# Patient Record
Sex: FEMALE | Race: WHITE | Employment: OTHER | RURAL
[De-identification: names, ages, dates, MRNs, and addresses within clinical notes are randomized per-mention and may not be internally consistent; named-entity substitution may affect disease eponyms.]

---

## 2017-04-18 ENCOUNTER — OFFICE VISIT (OUTPATIENT)
Dept: FAMILY MEDICINE CLINIC | Age: 47
End: 2017-04-18

## 2017-04-18 VITALS
BODY MASS INDEX: 24.69 KG/M2 | TEMPERATURE: 97.8 F | SYSTOLIC BLOOD PRESSURE: 100 MMHG | DIASTOLIC BLOOD PRESSURE: 70 MMHG | HEART RATE: 72 BPM | HEIGHT: 65 IN | RESPIRATION RATE: 16 BRPM | OXYGEN SATURATION: 96 % | WEIGHT: 148.2 LBS

## 2017-04-18 DIAGNOSIS — D32.0 BENIGN MENINGIOMA OF BRAIN (HCC): Primary | ICD-10-CM

## 2017-04-18 DIAGNOSIS — G40.909 SEIZURE DISORDER, SECONDARY (HCC): ICD-10-CM

## 2017-04-18 NOTE — MR AVS SNAPSHOT
Visit Information Date & Time Provider Department Dept. Phone Encounter #  
 4/18/2017  2:40 PM [facility] [phone] [id number] Follow-up Instructions Return if symptoms worsen or fail to improve. Follow-up and Disposition History Upcoming Health Maintenance Date Due DTaP/Tdap/Td series (1 - Tdap) 8/31/1990 PAP AKA CERVICAL CYTOLOGY 8/31/1990 INFLUENZA AGE 9 TO ADULT 8/1/2016 Allergies as of 4/18/2017  Review Complete On: 4/18/2017 By: Gabriela Quinones MD  
  
 Severity Noted Reaction Type Reactions Pcn [Penicillins]  07/21/2015    Hives Current Immunizations  Never Reviewed No immunizations on file. Not reviewed this visit You Were Diagnosed With   
  
 Codes Comments Benign [facility] ([facility]    -  Primary ICD-10-CM: D32.0 ICD-9-CM: 225.2 Seizure disorder, secondary ([facility]     ICD-10-CM: G40.909 ICD-9-CM: 345.90 Vitals BP Pulse Temp Resp Height(growth percentile) 100/70 (BP 1 Location: Left arm, BP Patient Position: Sitting) 72 97.8 °F (36.6 °C) (Temporal) 16 5' 5\" (1.651 m) Weight(growth percentile) SpO2 BMI OB Status Smoking Status 148 lb 3.2 oz (67.2 kg) 96% 24.66 kg/m2 Having regular periods Never Smoker Vitals History BMI and BSA Data Body Mass Index Body Surface Area  
 24.66 kg/m 2 1.76 m 2 Preferred Pharmacy Pharmacy Name Phone [facility] [address] [place] [address], [address] [place] [address] Dereck Leonard 101 Your Updated Medication List  
  
   
This list is accurate as of: 4/18/17  4:14 PM.  Always use your most recent med list.  
  
  
  
  
 lamoTRIgine 100 mg tablet Commonly known as: LaMICtal  
Take 100 mg by mouth two (2) times a day. multivitamin tablet Commonly known as:  ONE A DAY Take 1 Tab by mouth daily. VITAMIN C 500 mg tablet Generic drug:  ascorbic acid (vitamin C) Take 500 mg by mouth daily. Follow-up Instructions Return if symptoms worsen or fail to improve. Introducing [facility] SERVICES! Dear Ed Valdes: Thank you for requesting a MyVR account. Our records indicate that you already have an active MyVR account. You can access your account anytime at [email or web address] Did you know that you can access your hospital and ER discharge instructions at any time in MyVR? You can also review all of your test results from your hospital stay or ER visit. Additional Information If you have questions, please visit the Frequently Asked Questions section of the MyVR website at [email or web address] Remember, MyVR is NOT to be used for urgent needs. For medical emergencies, dial 911. Now available from your iPhone and Android! Please provide this summary of care documentation to your next provider. Your primary care clinician is listed as Aimee Cuevas. If you have any questions after today's visit, please call [phone].

## 2017-04-18 NOTE — PROGRESS NOTES
Cindy Corley is a 51 y.o. female presenting for/with: Other (Discuss Starting Back On Injections For Brain Tumor.)      HPI:  Cindy Corley is a 51 y.o. female who presents today for follow up history of meningioma being treated by Dr. Lu Thompson at [facility]. Had failure of just Sandostatin LAR 30mg IM qmo. Had serial MRIs over the summer show progression of the tumor. Considered for trial with [facility] Dr. Scruggs for consideration of a phase II clinical trial, but was turned down. Wound up getting spell of biliary colic, had a routine lap richy with Dr Jess Kang this past Dec 2016. Now is under consideration for a couple of options to try to prevent progression- avastin infusion q2wk and standostatin LAR inj qmo vs afinitor oral plus sandostatin LAR inj qmo. Continues to have occasional double vision. Using prisms, helps. No recent issues of blurred vision, paresthesia,  anesthesia or weakness. Continues to follow-up with [facility] only now, has follow-up in 4/20/17. PMH, SH, Medications/Allergies: reviewed, on chart.     ROS:  Constitutional: No fever, chills or weight loss  Respiratory: No cough, SOB   CV: No chest pain or Palpitations    Visit Vitals    /70 (BP 1 Location: Left arm, BP Patient Position: Sitting)    Pulse 72    Temp 97.8 °F (36.6 °C) (Temporal)    Resp 16    Ht 5' 5\" (1.651 m)    Wt 148 lb 3.2 oz (67.2 kg)    SpO2 96%    BMI 24.66 kg/m2     Wt Readings from Last 3 Encounters:   04/18/17 148 lb 3.2 oz (67.2 kg)   12/19/16 151 lb 4.8 oz (68.6 kg)   11/01/16 160 lb (72.6 kg)     Physical Examination: General appearance - alert, well appearing, and in no distress  Mental status - alert, oriented to person, place, and time  Eyes - pupils equal and reactive, extraocular eye movements intact  ENT - bilateral external ears and nose normal. Normal lips  Neck - supple, no significant adenopathy, no thyromegaly or mass  Lymphatics - no palpable lymphadenopathy, no hepatosplenomegaly  Chest - clear to auscultation, no wheezes, rales or rhonchi, symmetric air entry  Heart - normal rate, regular rhythm, normal S1, S2, no murmurs, rubs, clicks or gallops  Extremities - peripheral pulses normal, no pedal edema, no clubbing or cyanosis  Neuro- CN 2-12 intact to test, nl coordination and ROM UE and LE. Nl gait and Rhomberg. A/p:  Recurrent meningioma  With persistent neuro sx (diplopioa) and progression in size on MRI monitoring. Remains seizure free since last visit on lamictal 100 BID. shanna well. I note that we would be glad to inject the sandostatin LAR here, and that we have an infusion center in [place] (ph [phone]) staffed by trained oncology nurses who are familiar with handling avastin and other chemotherapy medications, which may be a good option for administration. F/U per neuro.

## 2017-07-17 ENCOUNTER — TELEPHONE (OUTPATIENT)
Dept: FAMILY MEDICINE CLINIC | Age: 47
End: 2017-07-17

## 2017-07-17 DIAGNOSIS — Z79.899 HIGH RISK MEDICATIONS (NOT ANTICOAGULANTS) LONG-TERM USE: Primary | ICD-10-CM

## 2017-07-17 DIAGNOSIS — E78.2 MIXED HYPERLIPIDEMIA: ICD-10-CM

## 2017-07-17 NOTE — TELEPHONE ENCOUNTER
Patient is requesting lab orders placed. She notes bruising and would like to know what her levels are. I'll schedule a nurse visit once they are in the system.

## 2017-07-18 PROBLEM — E78.2 MIXED HYPERLIPIDEMIA: Status: ACTIVE | Noted: 2017-07-18

## 2017-07-25 ENCOUNTER — CLINICAL SUPPORT (OUTPATIENT)
Dept: FAMILY MEDICINE CLINIC | Age: 47
End: 2017-07-25

## 2017-07-25 DIAGNOSIS — D32.0 BENIGN MENINGIOMA OF BRAIN (HCC): Primary | ICD-10-CM

## 2017-07-25 DIAGNOSIS — E78.2 MIXED HYPERLIPIDEMIA: ICD-10-CM

## 2017-07-25 DIAGNOSIS — Z00.00 ROUTINE GENERAL MEDICAL EXAMINATION AT A HEALTH CARE FACILITY: ICD-10-CM

## 2017-07-25 NOTE — MR AVS SNAPSHOT
Visit Information Date & Time Provider Department Dept. Phone Encounter #  
 7/25/2017  8:15 AM [facility] [phone] [id number] Upcoming Health Maintenance Date Due DTaP/Tdap/Td series (1 - Tdap) 8/31/1990 PAP AKA CERVICAL CYTOLOGY 8/31/1990 INFLUENZA AGE 9 TO ADULT 8/1/2017 Allergies as of 7/25/2017  Review Complete On: 4/18/2017 By: Jeny Hurley MD  
  
 Severity Noted Reaction Type Reactions Pcn [Penicillins]  07/21/2015    Hives Current Immunizations  Never Reviewed No immunizations on file. Not reviewed this visit You Were Diagnosed With   
  
 Codes Comments Benign meningioma of brain ([facility]    -  Primary ICD-10-CM: D32.0 ICD-9-CM: 225.2 Mixed hyperlipidemia     ICD-10-CM: E78.2 ICD-9-CM: 272.2 Routine general medical examination at a health care facility     ICD-10-CM: Z00.00 ICD-9-CM: V70.0 Vitals OB Status Smoking Status Having regular periods Never Smoker Preferred Pharmacy Pharmacy Name Phone [facility] [address] [place] [address] Dereck Leonard 101 Your Updated Medication List  
  
   
This list is accurate as of: 7/25/17  8:26 AM.  Always use your most recent med list.  
  
  
  
  
 lamoTRIgine 100 mg tablet Commonly known as: LaMICtal  
Take 100 mg by mouth two (2) times a day. multivitamin tablet Commonly known as:  ONE A DAY Take 1 Tab by mouth daily. VITAMIN C 500 mg tablet Generic drug:  ascorbic acid (vitamin C) Take 500 mg by mouth daily. We Performed the Following CBC WITH AUTOMATED DIFF [63204 CPT(R)] LIPID PANEL [99983 CPT(R)] METABOLIC PANEL, COMPREHENSIVE [21929 CPT(R)] MO COLLECTION VENOUS BLOOD,VENIPUNCTURE C5699230 CPT(R)] TSH 3RD GENERATION [85483 CPT(R)] [facility]! Dear Kimberlyn Beavers: Thank you for requesting a [employer] account. Our records indicate that you already have an active [employer] account. You can access your account anytime at [email or web address] Did you know that you can access your hospital and ER discharge instructions at any time in [employer]? You can also review all of your test results from your hospital stay or ER visit. Additional Information If you have questions, please visit the Frequently Asked Questions section of the [employer] website at [email or web address] Remember, [employer] is NOT to be used for urgent needs. For medical emergencies, dial 911. Now available from your iPhone and Android! Please provide this summary of care documentation to your next provider. Your primary care clinician is listed as Lise Bound. If you have any questions after today's visit, please call [phone].

## 2017-07-26 ENCOUNTER — CLINICAL SUPPORT (OUTPATIENT)
Dept: FAMILY MEDICINE CLINIC | Age: 47
End: 2017-07-26

## 2017-07-26 DIAGNOSIS — D32.0 BENIGN MENINGIOMA OF BRAIN (HCC): Primary | ICD-10-CM

## 2017-07-26 LAB
ALBUMIN SERPL-MCNC: 4.7 G/DL (ref 3.5–5.5)
ALBUMIN/GLOB SERPL: 2.1 {RATIO} (ref 1.2–2.2)
ALP SERPL-CCNC: 69 IU/L (ref 39–117)
ALT SERPL-CCNC: 10 IU/L (ref 0–32)
AST SERPL-CCNC: 18 IU/L (ref 0–40)
BASOPHILS # BLD AUTO: 0 X10E3/UL (ref 0–0.2)
BASOPHILS NFR BLD AUTO: 1 %
BILIRUB SERPL-MCNC: 0.4 MG/DL (ref 0–1.2)
BUN SERPL-MCNC: 11 MG/DL (ref 6–24)
BUN/CREAT SERPL: 14 (ref 9–23)
CALCIUM SERPL-MCNC: 9.7 MG/DL (ref 8.7–10.2)
CHLORIDE SERPL-SCNC: 102 MMOL/L (ref 96–106)
CHOLEST SERPL-MCNC: 208 MG/DL (ref 100–199)
CO2 SERPL-SCNC: 20 MMOL/L (ref 18–29)
CREAT SERPL-MCNC: 0.81 MG/DL (ref 0.57–1)
EOSINOPHIL # BLD AUTO: 0.1 X10E3/UL (ref 0–0.4)
EOSINOPHIL NFR BLD AUTO: 1 %
ERYTHROCYTE [DISTWIDTH] IN BLOOD BY AUTOMATED COUNT: 13.4 % (ref 12.3–15.4)
GLOBULIN SER CALC-MCNC: 2.2 G/DL (ref 1.5–4.5)
GLUCOSE SERPL-MCNC: 86 MG/DL (ref 65–99)
HCT VFR BLD AUTO: 39.4 % (ref 34–46.6)
HDLC SERPL-MCNC: 46 MG/DL
HGB BLD-MCNC: 13.1 G/DL (ref 11.1–15.9)
IMM GRANULOCYTES # BLD: 0 X10E3/UL (ref 0–0.1)
IMM GRANULOCYTES NFR BLD: 0 %
LDLC SERPL CALC-MCNC: 126 MG/DL (ref 0–99)
LYMPHOCYTES # BLD AUTO: 1.8 X10E3/UL (ref 0.7–3.1)
LYMPHOCYTES NFR BLD AUTO: 44 %
MCH RBC QN AUTO: 29.6 PG (ref 26.6–33)
MCHC RBC AUTO-ENTMCNC: 33.2 G/DL (ref 31.5–35.7)
MCV RBC AUTO: 89 FL (ref 79–97)
MONOCYTES # BLD AUTO: 0.2 X10E3/UL (ref 0.1–0.9)
MONOCYTES NFR BLD AUTO: 5 %
NEUTROPHILS # BLD AUTO: 2 X10E3/UL (ref 1.4–7)
NEUTROPHILS NFR BLD AUTO: 49 %
PLATELET # BLD AUTO: 291 X10E3/UL (ref 150–379)
POTASSIUM SERPL-SCNC: 4.7 MMOL/L (ref 3.5–5.2)
PROT SERPL-MCNC: 6.9 G/DL (ref 6–8.5)
RBC # BLD AUTO: 4.43 X10E6/UL (ref 3.77–5.28)
SODIUM SERPL-SCNC: 143 MMOL/L (ref 134–144)
TRIGL SERPL-MCNC: 180 MG/DL (ref 0–149)
TSH SERPL DL<=0.005 MIU/L-ACNC: 4.71 UIU/ML (ref 0.45–4.5)
VLDLC SERPL CALC-MCNC: 36 MG/DL (ref 5–40)
WBC # BLD AUTO: 4.1 X10E3/UL (ref 3.4–10.8)

## 2017-08-01 RX ORDER — LEVOTHYROXINE SODIUM 25 UG/1
25 TABLET ORAL
Qty: 30 TAB | Refills: 1 | Status: SHIPPED | OUTPATIENT
Start: 2017-08-01 | End: 2019-04-16 | Stop reason: ALTCHOICE

## 2017-08-01 NOTE — PROGRESS NOTES
Lipid panel cholesterol levels improving great job! Metabolic panel liver and kidney functions  are WNL excellent! TSH is high need to start thyroid medication. On multiple herbal medication please clarify with patients which ones are current .   I will consult with pharmacist which ones are safe to continue with thyroid medication once I have the current list .

## 2017-08-01 NOTE — PROGRESS NOTES
Patient advised of lab results per Marion Gaytan. Patient stated that she would like Marion Gaytan to give her a call concerning the new Thyroid medication.

## 2017-08-16 ENCOUNTER — TELEPHONE (OUTPATIENT)
Dept: FAMILY MEDICINE CLINIC | Age: 47
End: 2017-08-16

## 2017-08-17 ENCOUNTER — OFFICE VISIT (OUTPATIENT)
Dept: FAMILY MEDICINE CLINIC | Age: 47
End: 2017-08-17

## 2017-08-17 VITALS
WEIGHT: 141 LBS | DIASTOLIC BLOOD PRESSURE: 70 MMHG | SYSTOLIC BLOOD PRESSURE: 98 MMHG | BODY MASS INDEX: 23.46 KG/M2 | OXYGEN SATURATION: 97 % | HEART RATE: 72 BPM | RESPIRATION RATE: 16 BRPM

## 2017-08-17 DIAGNOSIS — K12.30 MUCOSITIS: Primary | ICD-10-CM

## 2017-08-17 RX ORDER — OMEGA-3-ACID ETHYL ESTERS 1 G/1
2 CAPSULE, LIQUID FILLED ORAL
COMMUNITY
Start: 2011-05-03 | End: 2019-04-16 | Stop reason: ALTCHOICE

## 2017-08-17 RX ORDER — GLUCOSAMINE SULFATE 1500 MG
2000 POWDER IN PACKET (EA) ORAL
COMMUNITY
Start: 2013-09-16 | End: 2019-01-03 | Stop reason: ALTCHOICE

## 2017-08-17 RX ORDER — TRIAMCINOLONE ACETONIDE 1 MG/G
PASTE DENTAL 2 TIMES DAILY
Qty: 5 G | Refills: 11 | Status: SHIPPED | OUTPATIENT
Start: 2017-08-17 | End: 2019-01-03 | Stop reason: ALTCHOICE

## 2017-08-17 RX ORDER — FLAXSEED OIL 1000 MG
2 CAPSULE ORAL
COMMUNITY
Start: 2011-05-03 | End: 2019-01-03 | Stop reason: ALTCHOICE

## 2017-08-17 RX ORDER — LANOLIN ALCOHOL/MO/W.PET/CERES
1 CREAM (GRAM) TOPICAL
COMMUNITY
Start: 2013-09-16 | End: 2019-01-03 | Stop reason: ALTCHOICE

## 2017-08-17 RX ORDER — OCTREOTIDE ACETATE,MI-SPHERES 30 MG
VIAL (EA) INTRAMUSCULAR
COMMUNITY
Start: 2017-07-21

## 2017-08-17 RX ORDER — EVEROLIMUS 10 MG/1
10 TABLET ORAL DAILY
COMMUNITY
Start: 2017-08-15 | End: 2019-04-29 | Stop reason: SINTOL

## 2017-08-17 RX ORDER — TRIAMCINOLONE ACETONIDE 1 MG/G
PASTE DENTAL 2 TIMES DAILY
Qty: 5 G | Refills: 11 | Status: SHIPPED | OUTPATIENT
Start: 2017-08-17 | End: 2017-08-17 | Stop reason: SDUPTHER

## 2017-08-17 NOTE — PROGRESS NOTES
Chief Complaint   Patient presents with    Mouth Lesions         HPI:      Chris Parker is a 51 y.o. female leukemia survivor (age 1) now treated with Afinitor for the past 3 weeks for meningioma--benign but slowly growing. Two weeks after starting this medication, she has developed sores in her mouth. These are painful. Has tried warm salt water gargles. Allergies   Allergen Reactions    Pcn [Penicillins] Hives       Current Outpatient Prescriptions   Medication Sig    cholecalciferol (VITAMIN D3) 1,000 unit cap Take 2,000 Units by mouth.  flaxseed oil 1,000 mg cap Take 2 Caps by mouth.  omega-3 acid ethyl esters (LOVAZA) 1 gram capsule Take 2 Caps by mouth.  cyanocobalamin (VITAMIN B12) 500 mcg tablet Take 1 Tab by mouth.  lamoTRIgine (LAMICTAL) 100 mg tablet Take 100 mg by mouth two (2) times a day.  AFINITOR 10 mg tab Take 10 mg by mouth daily.  SANDOSTATIN LAR DEPOT 30 mg serr injection by Injection route every thirty (30) days.  levothyroxine (SYNTHROID) 25 mcg tablet Take 1 Tab by mouth Daily (before breakfast). Indications: hypothyroidism    multivitamin (ONE A DAY) tablet Take 1 Tab by mouth daily.  ascorbic acid (VITAMIN C) 500 mg tablet Take 500 mg by mouth daily. No current facility-administered medications for this visit. Past Medical History:   Diagnosis Date    Brain tumor (benign) ([facility] 2010    meningioma    Contact dermatitis and other eczema, due to unspecified cause 2015    morphoea    Headache     brain tumor/ medication    Leukemia ([facility] 4    age 4-4 years old    Seizures ([facility]          ROS:  Denies fever, chills, cough, chest pain, SOB,  nausea, vomiting, or diarrhea. Denies wt loss, wt gain, hemoptysis, hematochezia or melena.     Physical Examination:    BP 98/70 (BP 1 Location: Left arm, BP Patient Position: Sitting)  Pulse 72  Resp 16  Wt 141 lb (64 kg)  SpO2 97%  BMI 23.46 kg/m2    General: Alert and Ox3, Fluent speech  HEENT: NC/AT, EOMI, OP: 5 punched out lesions with a central yellow pallor, 2-3 mm diameter. Neck:  Supple, no adenopathy, JVD, mass or bruit  Chest:  Clear to Ausculation, without wheezes, rales, rubs or ronchi  Cardiac: RRR  Abdomen:  +BS, soft, nontender without palpable HSM  Extremities:  No cyanosis, clubbing or edema  Neurologic:  Ambulatory without assist, CN 2-12 grossly intact. Moves all extremities. Skin: no rash  Lymphadenopathy: no cervical or supraclavicular nodes      ASSESSMENT AND PLAN:     1. Chemotherapy induced mucositis:  Magic mouthwash, Kenalog in Orajel. Folate supplementation. RTC if sx worsen      Orders Placed This Encounter    AFINITOR 10 mg tab     Sig: Take 10 mg by mouth daily.  SANDOSTATIN LAR DEPOT 30 mg serr injection     Sig: by Injection route every thirty (30) days.  cholecalciferol (VITAMIN D3) 1,000 unit cap     Sig: Take 2,000 Units by mouth.  flaxseed oil 1,000 mg cap     Sig: Take 2 Caps by mouth.  omega-3 acid ethyl esters (LOVAZA) 1 gram capsule     Sig: Take 2 Caps by mouth.  cyanocobalamin (VITAMIN B12) 500 mcg tablet     Sig: Take 1 Tab by mouth.        Manual MD [place], [address]

## 2017-08-17 NOTE — MR AVS SNAPSHOT
Visit Information Date & Time Provider Department Dept. Phone Encounter #  
 8/17/2017 10:30 AM Mildred Bocanegra MD [facility] [phone] [id number] Upcoming Health Maintenance Date Due DTaP/Tdap/Td series (1 - Tdap) 8/31/1990 PAP AKA CERVICAL CYTOLOGY 8/31/1990 Allergies as of 8/17/2017  Review Complete On: 8/17/2017 By: Mildred Bocanegra MD  
  
 Severity Noted Reaction Type Reactions Pcn [Penicillins]  07/21/2015    Hives Current Immunizations  Never Reviewed No immunizations on file. Not reviewed this visit You Were Diagnosed With   
  
 Codes Comments Mucositis    -  Primary ICD-10-CM: K12.30 ICD-9-CM: 528.00 Vitals BP Pulse Resp Weight(growth percentile) SpO2 BMI  
 98/70 (BP 1 Location: Left arm, BP Patient Position: Sitting) 72 16 141 lb (64 kg) 97% 23.46 kg/m2 OB Status Smoking Status Having regular periods Never Smoker BMI and BSA Data Body Mass Index Body Surface Area  
 23.46 kg/m 2 1.71 m 2 Preferred Pharmacy Pharmacy Name Phone [facility] [address] [facility], [address] [place] [address] Dereck Leonard 101 Your Updated Medication List  
  
   
This list is accurate as of: 8/17/17 11:54 AM.  Always use your most recent med list.  
  
  
  
  
 AFINITOR 10 mg Tab Generic drug:  Everolimus Take 10 mg by mouth daily. cholecalciferol 1,000 unit Cap Commonly known as:  VITAMIN D3 Take 2,000 Units by mouth.  
  
 cyanocobalamin 500 mcg tablet Commonly known as:  VITAMIN B12 Take 1 Tab by mouth. flaxseed oil 1,000 mg Cap Take 2 Caps by mouth.  
  
 lamoTRIgine 100 mg tablet Commonly known as: LaMICtal  
Take 100 mg by mouth two (2) times a day. levothyroxine 25 mcg tablet Commonly known as:  SYNTHROID Take 1 Tab by mouth Daily (before breakfast). Indications: hypothyroidism magic mouthwash 191--40 mg/30 mL Mwsh oral suspension Commonly known as:  FIRST-MOUTHWASH BLM Take 10 mL by mouth every four (4) hours as needed. multivitamin tablet Commonly known as:  ONE A DAY Take 1 Tab by mouth daily. omega-3 acid ethyl esters 1 gram capsule Commonly known as:  Genesis Mins Take 2 Caps by mouth. SandoSTATIN LAR Depot 30 mg/2 mL Serr injection Generic drug:  octreotide acetate microspheres  
by Injection route every thirty (30) days. triamcinolone acetonide 0.1 % dental paste Commonly known as:  KENALOG  
by Dental route two (2) times a day. VITAMIN C 500 mg tablet Generic drug:  ascorbic acid (vitamin C) Take 500 mg by mouth daily. Prescriptions Sent to Pharmacy Refills  
 magic mouthwash (FIRST-MOUTHWASH BLM) 122--40 mg/30 mL mwsh oral suspension 5 Sig: Take 10 mL by mouth every four (4) hours as needed. Class: Normal  
 Pharmacy: [id number] [facility] Ph #: [phone] Route: Oral  
 triamcinolone acetonide (KENALOG) 0.1 % dental paste 11 Sig: by Dental route two (2) times a day. Class: Normal  
 Pharmacy: [id number] [facility] Ph #: [phone] Route: Dental  
  
Introducing [facility]! Dear Lauri Shaw: Thank you for requesting a [employer] account. Our records indicate that you already have an active [employer] account. You can access your account anytime at [email or web address] Did you know that you can access your hospital and ER discharge instructions at any time in [employer]? You can also review all of your test results from your hospital stay or ER visit. Additional Information If you have questions, please visit the Frequently Asked Questions section of the [employer] website at [email or web address] Remember, [employer] is NOT to be used for urgent needs.  For medical emergencies, dial 911. Now available from your iPhone and Android! Please provide this summary of care documentation to your next provider. Your primary care clinician is listed as Casey Rivera. If you have any questions after today's visit, please call [phone].

## 2017-08-18 ENCOUNTER — TELEPHONE (OUTPATIENT)
Dept: FAMILY MEDICINE CLINIC | Age: 47
End: 2017-08-18

## 2017-08-29 ENCOUNTER — CLINICAL SUPPORT (OUTPATIENT)
Dept: FAMILY MEDICINE CLINIC | Age: 47
End: 2017-08-29

## 2017-08-29 DIAGNOSIS — D32.0 BENIGN MENINGIOMA OF BRAIN (HCC): Primary | ICD-10-CM

## 2017-08-29 NOTE — PROGRESS NOTES
Patient in for Sandostatin LAR Depot 30 mg injection ,her own Medication IM left gluteus per protocol.

## 2017-08-29 NOTE — MR AVS SNAPSHOT
Visit Information Date & Time Provider Department Dept. Phone Encounter #  
 8/29/2017  4:15 PM [address] [phone] [id number] Upcoming Health Maintenance Date Due DTaP/Tdap/Td series (1 - Tdap) 8/31/1990 PAP AKA CERVICAL CYTOLOGY 8/31/1990 Allergies as of 8/29/2017  Review Complete On: 8/17/2017 By: Suhas Whatley MD  
  
 Severity Noted Reaction Type Reactions Pcn [Penicillins]  07/21/2015    Hives Current Immunizations  Never Reviewed No immunizations on file. Not reviewed this visit Vitals OB Status Smoking Status Having regular periods Never Smoker Your Updated Medication List  
  
   
This list is accurate as of: 8/29/17  4:52 PM.  Always use your most recent med list.  
  
  
  
  
 AFINITOR 10 mg Tab Generic drug:  Everolimus Take 10 mg by mouth daily. cholecalciferol 1,000 unit Cap Commonly known as:  VITAMIN D3 Take 2,000 Units by mouth.  
  
 cyanocobalamin 500 mcg tablet Commonly known as:  VITAMIN B12 Take 1 Tab by mouth. flaxseed oil 1,000 mg Cap Take 2 Caps by mouth.  
  
 lamoTRIgine 100 mg tablet Commonly known as: LaMICtal  
Take 100 mg by mouth two (2) times a day. levothyroxine 25 mcg tablet Commonly known as:  SYNTHROID Take 1 Tab by mouth Daily (before breakfast). Indications: hypothyroidism  
  
 magic mouthwash 856--40 mg/30 mL Mwsh oral suspension Commonly known as:  FIRST-MOUTHWASH BLM Take 10 mL by mouth every four (4) hours as needed. multivitamin tablet Commonly known as:  ONE A DAY Take 1 Tab by mouth daily. omega-3 acid ethyl esters 1 gram capsule Commonly known as:  Jose Lapine Take 2 Caps by mouth. SandoSTATIN LAR Depot 30 mg/2 mL Serr injection Generic drug:  octreotide acetate microspheres  
by Injection route every thirty (30) days. triamcinolone acetonide 0.1 % dental paste Commonly known as:  KENALOG  
by Dental route two (2) times a day. VITAMIN C 500 mg tablet Generic drug:  ascorbic acid (vitamin C) Take 500 mg by mouth daily. Introducing [facility] SERVICES! Dear Minh Dennison: Thank you for requesting a Transave account. Our records indicate that you already have an active Transave account. You can access your account anytime at [email or web address] Did you know that you can access your hospital and ER discharge instructions at any time in Transave? You can also review all of your test results from your hospital stay or ER visit. Additional Information If you have questions, please visit the Frequently Asked Questions section of the Transave website at [email or web address] Remember, Transave is NOT to be used for urgent needs. For medical emergencies, dial 911. Now available from your iPhone and Android! Please provide this summary of care documentation to your next provider. Your primary care clinician is listed as Jeremy Zheng. If you have any questions after today's visit, please call [phone].

## 2017-09-28 ENCOUNTER — OFFICE VISIT (OUTPATIENT)
Dept: FAMILY MEDICINE CLINIC | Age: 47
End: 2017-09-28

## 2017-09-28 VITALS — BODY MASS INDEX: 23.66 KG/M2 | HEIGHT: 65 IN | WEIGHT: 142 LBS

## 2017-09-28 DIAGNOSIS — K12.1 MOUTH ULCERS: Primary | ICD-10-CM

## 2017-09-28 NOTE — PROGRESS NOTES
Chief Complaint   Patient presents with    Facial Swelling     right side         HPI:        Krystyna Gardiner is a 49 y.o. female who notes the onset of oral lesions. The details are as follows: Onset of painful oral lesions since taking Afinitor (Everolimus:  mTOR inhibitor with numerous side effects including mucositis and stomatitis. Angioedema and rash are also documented in the PI). Seen at [facility] and urged to see her PCP for an oral HSV viral culture. Denies conjunctival, urethral, vaginal or rectal pain, diarrhea although she has had URI sx with sore throat for a couple of days. No fever. She was treated for Leukemia at age 1. Prescribed Afinitor 4 weeks ago for a slowly growing meningioma. Allergies   Allergen Reactions    Pcn [Penicillins] Hives       Current Outpatient Prescriptions   Medication Sig    AFINITOR 10 mg tab Take 10 mg by mouth daily.  SANDOSTATIN LAR DEPOT 30 mg serr injection by Injection route every thirty (30) days.  lamoTRIgine (LAMICTAL) 100 mg tablet Take 100 mg by mouth two (2) times a day.  cholecalciferol (VITAMIN D3) 1,000 unit cap Take 2,000 Units by mouth.  flaxseed oil 1,000 mg cap Take 2 Caps by mouth.  omega-3 acid ethyl esters (LOVAZA) 1 gram capsule Take 2 Caps by mouth.  cyanocobalamin (VITAMIN B12) 500 mcg tablet Take 1 Tab by mouth.  magic mouthwash (FIRST-MOUTHWASH BLM) 156--40 mg/30 mL mwsh oral suspension Take 10 mL by mouth every four (4) hours as needed.  triamcinolone acetonide (KENALOG) 0.1 % dental paste by Dental route two (2) times a day.  levothyroxine (SYNTHROID) 25 mcg tablet Take 1 Tab by mouth Daily (before breakfast). Indications: hypothyroidism    multivitamin (ONE A DAY) tablet Take 1 Tab by mouth daily.  ascorbic acid (VITAMIN C) 500 mg tablet Take 500 mg by mouth daily. No current facility-administered medications for this visit.         Past Medical History:   Diagnosis Date    [facility] ([facility] 2010    meningioma    Contact dermatitis and other eczema, due to unspecified cause 2015    morphoea    Headache     brain tumor/ medication    Leukemia ([facility] 5    age 4-4 years old    Seizures ([facility]          ROS:  Denies fever, chills, cough, chest pain, SOB,  nausea, vomiting, or diarrhea. Denies wt loss, wt gain, hemoptysis, hematochezia or melena. Physical Examination:    Visit Vitals    Ht 5' 5\" (1.651 m)    Wt 142 lb (64.4 kg)    BMI 23.63 kg/m2      General:  Alert, cooperative, no distress. Head:  Normocephalic, without obvious abnormality, atraumatic. Eyes:  Conjunctivae/corneas clear. Pupils equal, round, reactive to light. Chest wall:  No tenderness or deformity. Extremities: Extremities normal, atraumatic, no cyanosis or edema. Skin:         Lymph nodes: Cervical and supraclavicular nodes are normal.   Neurologic: Moves all extremities, fluent speech         ASSESSMENT AND PLAN:    1. Painful oral mucosal lesions:  DDx likely due to Afinitor, although HSV cannot be excluded. Viral culture obtained in the clinic and sent to [facility] today. Continue magic mouthwash. Will contact patient with results when they are available. Patient educated about other potential side effects of this medication and urged her to seek medical attention if sx worsened.     Orders Placed This Encounter    CULTURE, HSV W/ TYPING     Order Specific Question:   Specimen type     Answer:   Mouth [177]       Anay Sadler MD, [address]

## 2017-09-28 NOTE — MR AVS SNAPSHOT
Visit Information Date & Time Provider Department Dept. Phone Encounter #  
 9/28/2017  1:30 PM Santiago Gutierrez, [address] [phone] [id number] Upcoming Health Maintenance Date Due DTaP/Tdap/Td series (1 - Tdap) 8/31/1990 PAP AKA CERVICAL CYTOLOGY 8/31/1990 Allergies as of 9/28/2017  Review Complete On: 9/28/2017 By: Santiago Gutierrez MD  
  
 Severity Noted Reaction Type Reactions Pcn [Penicillins]  07/21/2015    Hives Current Immunizations  Never Reviewed No immunizations on file. Not reviewed this visit You Were Diagnosed With   
  
 Codes Comments Mouth ulcers    -  Primary ICD-10-CM: K12.1 ICD-9-CM: 528.9 Vitals Height(growth percentile) Weight(growth percentile) BMI OB Status Smoking Status 5' 5\" (1.651 m) 142 lb (64.4 kg) 23.63 kg/m2 Having regular periods Never Smoker BMI and BSA Data Body Mass Index Body Surface Area  
 23.63 kg/m 2 1.72 m 2 Preferred Pharmacy Pharmacy Name Phone [facility] [place] [address], 1 [facility] Dereck Leonard 101 Your Updated Medication List  
  
   
This list is accurate as of: 9/28/17  1:58 PM.  Always use your most recent med list.  
  
  
  
  
 AFINITOR 10 mg Tab Generic drug:  Everolimus Take 10 mg by mouth daily. cholecalciferol 1,000 unit Cap Commonly known as:  VITAMIN D3 Take 2,000 Units by mouth.  
  
 cyanocobalamin 500 mcg tablet Commonly known as:  VITAMIN B12 Take 1 Tab by mouth. flaxseed oil 1,000 mg Cap Take 2 Caps by mouth.  
  
 lamoTRIgine 100 mg tablet Commonly known as: LaMICtal  
Take 100 mg by mouth two (2) times a day. levothyroxine 25 mcg tablet Commonly known as:  SYNTHROID Take 1 Tab by mouth Daily (before breakfast). Indications: hypothyroidism  
  
 magic mouthwash 120--40 mg/30 mL Mwsh oral suspension Commonly known as:  FIRST-MOUTHWASH BLM  
 Take 10 mL by mouth every four (4) hours as needed. multivitamin tablet Commonly known as:  ONE A DAY Take 1 Tab by mouth daily. omega-3 acid ethyl esters 1 gram capsule Commonly known as:  Nuno Mealy Take 2 Caps by mouth. SandoSTATIN LAR Depot 30 mg/2 mL Serr injection Generic drug:  octreotide acetate microspheres  
by Injection route every thirty (30) days. triamcinolone acetonide 0.1 % dental paste Commonly known as:  KENALOG  
by Dental route two (2) times a day. VITAMIN C 500 mg tablet Generic drug:  ascorbic acid (vitamin C) Take 500 mg by mouth daily. We Performed the Following CULTURE, HSV W/ TYPING [78486 CPT(R)] Introducing [facility] & [facility]! Dear Sharry Canavan: Thank you for requesting a Fooala account. Our records indicate that you already have an active Fooala account. You can access your account anytime at [email or web address]/AlphaLab Did you know that you can access your hospital and ER discharge instructions at any time in Fooala? You can also review all of your test results from your hospital stay or ER visit. Additional Information If you have questions, please visit the Frequently Asked Questions section of the Fooala website at [email or web address]/[email or web address] Remember, Fooala is NOT to be used for urgent needs. For medical emergencies, dial 911. Now available from your iPhone and Android! Please provide this summary of care documentation to your next provider. Your primary care clinician is listed as Kimberley Lucas. If you have any questions after today's visit, please call [phone].

## 2017-09-28 NOTE — LETTER
NOTIFICATION RETURN TO WORK / SCHOOL 2017 Re: Shawn Madsen : 1970 To Whom It May Concern: 
 
Nasima Page is currently under the care of [address] [place] [address]. She is excused from work today. If there are questions or concerns please have the patient contact our office. Sincerely, Gerard Coy MD

## 2017-09-28 NOTE — PROGRESS NOTES
Patient received IM Injection Right gluteus of Sandostatin LR Depot 30 mg [facility]#[id number] Exp  Jun 2019 per protocol.

## 2017-10-02 LAB — HSV SPEC CULT: NORMAL

## 2017-10-19 ENCOUNTER — TELEPHONE (OUTPATIENT)
Dept: FAMILY MEDICINE CLINIC | Age: 47
End: 2017-10-19

## 2017-10-19 NOTE — TELEPHONE ENCOUNTER
Patient saw Dr. Mary Pace, her [facility] oncologist, who is not happy with her cholesterol levels. He suggested she discuss ways to improve them with Dr. Lesly Severs. I have requested the notes and labs from patient's visit yesterday with Dr. Mary Pace in the meantime.

## 2017-10-27 ENCOUNTER — CLINICAL SUPPORT (OUTPATIENT)
Dept: FAMILY MEDICINE CLINIC | Age: 47
End: 2017-10-27

## 2017-10-27 DIAGNOSIS — D32.0 BENIGN MENINGIOMA OF BRAIN (HCC): Primary | ICD-10-CM

## 2017-10-27 NOTE — PROGRESS NOTES
Patient in for Sandostatin Lar Depot 30mg shot patient supplied lot# [id number] exp 01/2020 given in left buttocks.

## 2017-10-27 NOTE — MR AVS SNAPSHOT
Visit Information Date & Time Provider Department Dept. Phone Encounter #  
 10/27/2017  4:00 PM [address] [phone] [id number] Upcoming Health Maintenance Date Due DTaP/Tdap/Td series (1 - Tdap) 8/31/1990 PAP AKA CERVICAL CYTOLOGY 8/31/1990 Allergies as of 10/27/2017  Review Complete On: 9/28/2017 By: Janice Peoples MD  
  
 Severity Noted Reaction Type Reactions Pcn [Penicillins]  07/21/2015    Hives Current Immunizations  Never Reviewed No immunizations on file. Not reviewed this visit You Were Diagnosed With   
  
 Codes Comments Benign meningioma of brain ([facility] [address]    -  Primary ICD-10-CM: D32.0 ICD-9-CM: 225.2 Vitals OB Status Smoking Status Having regular periods Never Smoker Preferred Pharmacy Pharmacy Name Phone [facility] [address] Dereck Leonard 101 Your Updated Medication List  
  
   
This list is accurate as of: 10/27/17 11:59 PM.  Always use your most recent med list.  
  
  
  
  
 AFINITOR 10 mg Tab Generic drug:  Everolimus Take 10 mg by mouth daily. cholecalciferol 1,000 unit Cap Commonly known as:  VITAMIN D3 Take 2,000 Units by mouth.  
  
 cyanocobalamin 500 mcg tablet Commonly known as:  VITAMIN B12 Take 1 Tab by mouth. flaxseed oil 1,000 mg Cap Take 2 Caps by mouth.  
  
 lamoTRIgine 100 mg tablet Commonly known as: LaMICtal  
Take 100 mg by mouth two (2) times a day. levothyroxine 25 mcg tablet Commonly known as:  SYNTHROID Take 1 Tab by mouth Daily (before breakfast). Indications: hypothyroidism  
  
 magic mouthwash 106--40 mg/30 mL Mwsh oral suspension Commonly known as:  FIRST-MOUTHWASH BLM Take 10 mL by mouth every four (4) hours as needed. multivitamin tablet Commonly known as:  ONE A DAY Take 1 Tab by mouth daily. omega-3 acid ethyl esters 1 gram capsule Commonly known as:  Roselind Layne Take 2 Caps by mouth. SandoSTATIN LAR Depot 30 mg/2 mL Serr injection Generic drug:  octreotide acetate microspheres  
by Injection route every thirty (30) days. triamcinolone acetonide 0.1 % dental paste Commonly known as:  KENALOG  
by Dental route two (2) times a day. VITAMIN C 500 mg tablet Generic drug:  ascorbic acid (vitamin C) Take 500 mg by mouth daily. We Performed the Following THER/PROPH/DIAG INJECTION, SUBCUT/IM [id number] CPT(R)] Introducing [facility] & [facility] SERVICES! Dear Conrado Mendoza: Thank you for requesting a [employer] account. Our records indicate that you already have an active [employer] account. You can access your account anytime at [email or web address] [email or web address]/Stocard Did you know that you can access your hospital and ER discharge instructions at any time in [employer]? You can also review all of your test results from your hospital stay or ER visit. Additional Information If you have questions, please visit the Frequently Asked Questions section of the [employer] website at [email or web address] [employer]/Stocard/. Remember, [employer] is NOT to be used for urgent needs. For medical emergencies, dial 911. Now available from your iPhone and Android! Please provide this summary of care documentation to your next provider. Your primary care clinician is listed as Jersey Moseley. If you have any questions after today's visit, please call [phone].

## 2017-10-30 NOTE — TELEPHONE ENCOUNTER
Would you like us to work patient in this week to review labs ? May Dean Could it wait ? You will be the only provider in office on Thursday Norm[place] on vacation until 11/06/17.

## 2017-11-02 ENCOUNTER — OFFICE VISIT (OUTPATIENT)
Dept: FAMILY MEDICINE CLINIC | Age: 47
End: 2017-11-02

## 2017-11-02 VITALS
TEMPERATURE: 97.6 F | SYSTOLIC BLOOD PRESSURE: 102 MMHG | WEIGHT: 138.6 LBS | HEART RATE: 87 BPM | RESPIRATION RATE: 16 BRPM | HEIGHT: 65 IN | BODY MASS INDEX: 23.09 KG/M2 | DIASTOLIC BLOOD PRESSURE: 80 MMHG | OXYGEN SATURATION: 99 %

## 2017-11-02 DIAGNOSIS — E78.2 MIXED HYPERLIPIDEMIA: Primary | ICD-10-CM

## 2017-11-02 NOTE — PROGRESS NOTES
Chief Complaint   Patient presents with    Cholesterol Problem         HPI:      Leandro Arzola is a 49 y.o. female. Childhood leukemia survivor and now in treatment with Afinitor for meningioma--here with recent labs from her oncologist with a cholesterol of 300+. No FH and she has no history of vascular disease. Takes no meds for cholesterol. Allergies   Allergen Reactions    Pcn [Penicillins] Hives       Current Outpatient Prescriptions   Medication Sig    AFINITOR 10 mg tab Take 10 mg by mouth daily.  SANDOSTATIN LAR DEPOT 30 mg serr injection by Injection route every thirty (30) days.  magic mouthwash (FIRST-MOUTHWASH BLM) 238--40 mg/30 mL mwsh oral suspension Take 10 mL by mouth every four (4) hours as needed. (Patient taking differently: Take 10 mL by mouth every four (4) hours as needed. prn)    lamoTRIgine (LAMICTAL) 100 mg tablet Take 100 mg by mouth two (2) times a day.  cholecalciferol (VITAMIN D3) 1,000 unit cap Take 2,000 Units by mouth.  flaxseed oil 1,000 mg cap Take 2 Caps by mouth.  omega-3 acid ethyl esters (LOVAZA) 1 gram capsule Take 2 Caps by mouth.  cyanocobalamin (VITAMIN B12) 500 mcg tablet Take 1 Tab by mouth.  triamcinolone acetonide (KENALOG) 0.1 % dental paste by Dental route two (2) times a day.  levothyroxine (SYNTHROID) 25 mcg tablet Take 1 Tab by mouth Daily (before breakfast). Indications: hypothyroidism    multivitamin (ONE A DAY) tablet Take 1 Tab by mouth daily.  ascorbic acid (VITAMIN C) 500 mg tablet Take 500 mg by mouth daily. No current facility-administered medications for this visit.         Past Medical History:   Diagnosis Date    Brain tumor (benign) ([address] 2010    meningioma    Contact dermatitis and other eczema, due to unspecified cause 2015    morphoea    Headache     brain tumor/ medication    Leukemia ([facility] [address] 5    age 4-4 years old    Seizures ([facility] [address]          ROS:  Denies fever, chills, cough, chest pain, SOB, nausea, vomiting, or diarrhea. Denies wt loss, wt gain, hemoptysis, hematochezia or melena. Physical Examination:    /80 (BP 1 Location: Left arm, BP Patient Position: Sitting)  Pulse 87  Temp 97.6 °F (36.4 °C) (Temporal)   Resp 16  Ht 5' 5\" (1.651 m)  Wt 138 lb 9.6 oz (62.9 kg)  SpO2 99%  BMI 23.06 kg/m2    General: Alert and Ox3, Fluent speech  HEENT:  NC/AT, EOMI, OP: clear  Neck:  Supple, no adenopathy, JVD, mass or bruit  Chest:  Clear to Ausculation, without wheezes, rales, rubs or ronchi  Cardiac: RRR  Abdomen:  +BS, soft, nontender without palpable HSM  Extremities:  No cyanosis, clubbing or edema  Neurologic:  Ambulatory without assist, CN 2-12 grossly intact. Moves all extremities. Skin: no rash  Lymphadenopathy: no cervical or supraclavicular nodes      ASSESSMENT AND PLAN:     1. Mixed Hyperlipidemia:  Does she in fact have vascular disease? Are her lipids up as a result of her chemotherapy? Imaging would be helpful to determine if she in fact has vascular disease. Calcium score heart scanning or carotid doppler testing would be helpful in guiding treatment decision making. Repeat lipid profile today and will discuss results with her. Options include Crestor, Lipitor and Tricor. No orders of the defined types were placed in this encounter.       Andrea Wong MD, [address]

## 2017-11-02 NOTE — MR AVS SNAPSHOT
Visit Information Date & Time Provider Department Dept. Phone Encounter #  
 11/2/2017  8:45 AM Yony Baron MD [address] [address] [phone] [id number] Upcoming Health Maintenance Date Due DTaP/Tdap/Td series (1 - Tdap) 8/31/1990 PAP AKA CERVICAL CYTOLOGY 8/31/1990 Allergies as of 11/2/2017  Review Complete On: 11/2/2017 By: Yony Baron MD  
  
 Severity Noted Reaction Type Reactions Pcn [Penicillins]  07/21/2015    Hives Current Immunizations  Never Reviewed No immunizations on file. Not reviewed this visit You Were Diagnosed With   
  
 Codes Comments Mixed hyperlipidemia    -  Primary ICD-10-CM: R75.7 ICD-9-CM: 272.2 Vitals BP Pulse Temp Resp Height(growth percentile) 102/80 (BP 1 Location: Left arm, BP Patient Position: Sitting) 87 97.6 °F (36.4 °C) (Temporal) 16 5' 5\" (1.651 m) Weight(growth percentile) SpO2 BMI OB Status Smoking Status 138 lb 9.6 oz (62.9 kg) 99% 23.06 kg/m2 Having regular periods Never Smoker Vitals History BMI and BSA Data Body Mass Index Body Surface Area 23.06 kg/m 2 1.7 m 2 Preferred Pharmacy Pharmacy Name Phone [facility] [address] [place] [address] Dereck Leonard [address] Your Updated Medication List  
  
   
This list is accurate as of: 11/2/17  9:42 AM.  Always use your most recent med list.  
  
  
  
  
 AFINITOR 10 mg Tab Generic drug:  Everolimus Take 10 mg by mouth daily. cholecalciferol 1,000 unit Cap Commonly known as:  VITAMIN D3 Take 2,000 Units by mouth.  
  
 cyanocobalamin 500 mcg tablet Commonly known as:  VITAMIN B12 Take 1 Tab by mouth. flaxseed oil 1,000 mg Cap Take 2 Caps by mouth.  
  
 lamoTRIgine 100 mg tablet Commonly known as: LaMICtal  
Take 100 mg by mouth two (2) times a day. levothyroxine 25 mcg tablet Commonly known as:  SYNTHROID  
 Take 1 Tab by mouth Daily (before breakfast). Indications: hypothyroidism  
  
 magic mouthwash 833--40 mg/30 mL Mwsh oral suspension Commonly known as:  FIRST-MOUTHWASH BLM Take 10 mL by mouth every four (4) hours as needed. multivitamin tablet Commonly known as:  ONE A DAY Take 1 Tab by mouth daily. omega-3 acid ethyl esters 1 gram capsule Commonly known as:  Yoko Milner Take 2 Caps by mouth. SandoSTATIN LAR Depot 30 mg/2 mL Serr injection Generic drug:  octreotide acetate microspheres  
by Injection route every thirty (30) days. triamcinolone acetonide 0.1 % dental paste Commonly known as:  KENALOG  
by Dental route two (2) times a day. VITAMIN C 500 mg tablet Generic drug:  ascorbic acid (vitamin C) Take 500 mg by mouth daily. We Performed the Following LIPID PANEL [06159 CPT(R)] Introducing [facility] & [facility]! Dear Venecia Patterson: Thank you for requesting a Coinsetter account. Our records indicate that you already have an active Coinsetter account. You can access your account anytime at [email or web address] [employer]Dream[employer] Learning Did you know that you can access your hospital and ER discharge instructions at any time in Coinsetter? You can also review all of your test results from your hospital stay or ER visit. Additional Information If you have questions, please visit the Frequently Asked Questions section of the Co[employer]ter website at [email or web address] [employer]/. Remember, Coinsetter is NOT to be used for urgent needs. For medical emergencies, dial 911. Now available from your iPhone and Android! Please provide this summary of care documentation to your next provider. Your primary care clinician is listed as [facility] President. If you have any questions after today's visit, please call [phone].

## 2017-11-04 LAB
CHOLEST SERPL-MCNC: 349 MG/DL (ref 100–199)
HDLC SERPL-MCNC: 53 MG/DL
LDLC SERPL CALC-MCNC: 227 MG/DL (ref 0–99)
TRIGL SERPL-MCNC: 344 MG/DL (ref 0–149)
VLDLC SERPL CALC-MCNC: 69 MG/DL (ref 5–40)

## 2017-11-05 DIAGNOSIS — E78.2 MIXED HYPERLIPIDEMIA: Primary | ICD-10-CM

## 2017-11-05 RX ORDER — ROSUVASTATIN CALCIUM 10 MG/1
10 TABLET, COATED ORAL
Qty: 90 TAB | Refills: 4 | Status: SHIPPED | OUTPATIENT
Start: 2017-11-05 | End: 2018-11-06 | Stop reason: SDUPTHER

## 2017-11-27 ENCOUNTER — CLINICAL SUPPORT (OUTPATIENT)
Dept: FAMILY MEDICINE CLINIC | Age: 47
End: 2017-11-27

## 2017-11-27 DIAGNOSIS — D32.0 BENIGN MENINGIOMA OF BRAIN (HCC): Primary | ICD-10-CM

## 2017-11-27 NOTE — PROGRESS NOTES
Patient here for her sandostatin  30 mg injection lot [id number] ,exp 2-2020 given IM in left gluteal,tolerated without signs and symptoms of reaction

## 2017-11-27 NOTE — MR AVS SNAPSHOT
Visit Information Date & Time Provider Department Dept. Phone Encounter #  
 11/27/2017  3:45 PM [address] [phone] [id number] Upcoming Health Maintenance Date Due DTaP/Tdap/Td series (1 - Tdap) 8/31/1990 PAP AKA CERVICAL CYTOLOGY 8/31/1990 Allergies as of 11/27/2017  Review Complete On: 11/2/2017 By: Ariel Pedro MD  
  
 Severity Noted Reaction Type Reactions Pcn [Penicillins]  07/21/2015    Hives Current Immunizations  Never Reviewed No immunizations on file. Not reviewed this visit Vitals OB Status Smoking Status Having regular periods Never Smoker Your Updated Medication List  
  
   
This list is accurate as of: 11/27/17  3:52 PM.  Always use your most recent med list.  
  
  
  
  
 AFINITOR 10 mg Tab Generic drug:  Everolimus Take 10 mg by mouth daily. cholecalciferol 1,000 unit Cap Commonly known as:  VITAMIN D3 Take 2,000 Units by mouth.  
  
 cyanocobalamin 500 mcg tablet Commonly known as:  VITAMIN B12 Take 1 Tab by mouth. flaxseed oil 1,000 mg Cap Take 2 Caps by mouth.  
  
 lamoTRIgine 100 mg tablet Commonly known as: LaMICtal  
Take 100 mg by mouth two (2) times a day. levothyroxine 25 mcg tablet Commonly known as:  SYNTHROID Take 1 Tab by mouth Daily (before breakfast). Indications: hypothyroidism  
  
 magic mouthwash 711--40 mg/30 mL Mwsh oral suspension Commonly known as:  FIRST-MOUTHWASH BLM Take 10 mL by mouth every four (4) hours as needed. multivitamin tablet Commonly known as:  ONE A DAY Take 1 Tab by mouth daily. omega-3 acid ethyl esters 1 gram capsule Commonly known as:  Manjinder Alhaji Take 2 Caps by mouth. rosuvastatin 10 mg tablet Commonly known as:  CRESTOR Take 1 Tab by mouth nightly. SandoSTATIN LAR Depot 30 mg/2 mL Serr injection Generic drug:  octreotide acetate microspheres  
by Injection route every thirty (30) days. triamcinolone acetonide 0.1 % dental paste Commonly known as:  KENALOG  
by Dental route two (2) times a day. VITAMIN C 500 mg tablet Generic drug:  ascorbic acid (vitamin C) Take 500 mg by mouth daily. Introducing [facility] & [facility] SERVICES! Dear Jonathan Trammell: Thank you for requesting a Domain Media account. Our records indicate that you already have an active [employer] Media account. You can access your account anytime at [email or web address]. [employer] Did you know that you can access your hospital and ER discharge instructions at any time in [employer] Media? You can also review all of your test results from your hospital stay or ER visit. Additional Information If you have questions, please visit the Frequently Asked Questions section of the Domain Media website at [email or web address]/. Remember, Domain Media is NOT to be used for urgent needs. For medical emergencies, dial 911. Now available from your iPhone and Android! Please provide this summary of care documentation to your next provider. Your primary care clinician is listed as Debra Montes. If you have any questions after today's visit, please call [phone].

## 2018-01-26 ENCOUNTER — CLINICAL SUPPORT (OUTPATIENT)
Dept: FAMILY MEDICINE CLINIC | Age: 48
End: 2018-01-26

## 2018-01-26 DIAGNOSIS — D32.0 BENIGN MENINGIOMA OF BRAIN (HCC): Primary | ICD-10-CM

## 2018-01-26 NOTE — PROGRESS NOTES
Patient in for Injection Sandostatin LAR Depot, given in right hip. Lot #[id number] Exp. 03/31/2020. Injection tolerated well.

## 2018-01-26 NOTE — MR AVS SNAPSHOT
[address] [employer] [address] 
[phone] Patient: Isabel Shaver MRN: [id number] Jamee Cranker Visit Information Date & Time Provider Department Dept. Phone Encounter #  
 1/26/2018  8:00 AM [address] [place] [address] [address] [phone] [id number] Upcoming Health Maintenance Date Due DTaP/Tdap/Td series (1 - Tdap) 8/31/1990 PAP AKA CERVICAL CYTOLOGY 8/31/1990 Allergies as of 1/26/2018  Review Complete On: 11/2/2017 By: Catherine Jolly MD  
  
 Severity Noted Reaction Type Reactions Pcn [Penicillins]  07/21/2015    Hives Current Immunizations  Never Reviewed No immunizations on file. Not reviewed this visit Vitals OB Status Smoking Status Having regular periods Never Smoker Your Updated Medication List  
  
   
This list is accurate as of: 1/26/18  8:21 AM.  Always use your most recent med list.  
  
  
  
  
 AFINITOR 10 mg Tab Generic drug:  Everolimus Take 10 mg by mouth daily. cholecalciferol 1,000 unit Cap Commonly known as:  VITAMIN D3 Take 2,000 Units by mouth.  
  
 cyanocobalamin 500 mcg tablet Commonly known as:  VITAMIN B12 Take 1 Tab by mouth. flaxseed oil 1,000 mg Cap Take 2 Caps by mouth.  
  
 lamoTRIgine 100 mg tablet Commonly known as: LaMICtal  
Take 100 mg by mouth two (2) times a day. levothyroxine 25 mcg tablet Commonly known as:  SYNTHROID Take 1 Tab by mouth Daily (before breakfast). Indications: hypothyroidism  
  
 magic mouthwash 720--40 mg/30 mL Mwsh oral suspension Commonly known as:  FIRST-MOUTHWASH BLM Take 10 mL by mouth every four (4) hours as needed. multivitamin tablet Commonly known as:  ONE A DAY Take 1 Tab by mouth daily. omega-3 acid ethyl esters 1 gram capsule Commonly known as:  Duran Winter Take 2 Caps by mouth. rosuvastatin 10 mg tablet Commonly known as:  CRESTOR Take 1 Tab by mouth nightly. SandoSTATIN LAR Depot 30 mg/2 mL Serr injection Generic drug:  octreotide acetate microspheres  
by Injection route every thirty (30) days. triamcinolone acetonide 0.1 % dental paste Commonly known as:  KENALOG  
by Dental route two (2) times a day. VITAMIN C 500 mg tablet Generic drug:  ascorbic acid (vitamin C) Take 500 mg by mouth daily. Introducing [facility] & [facility] SERVICES! Dear Anoop Rios: Thank you for requesting a [employer] account. Our records indicate that you already have an active [employer] Health account. You can access your account anytime at [email or web address] Did you know that you can access your hospital and ER discharge instructions at any time in [employer] Health? You can also review all of your test results from your hospital stay or ER visit. Additional Information If you have questions, please visit the Frequently Asked Questions section of the [employer] Health website at [email or web address] Remember, [employer] is NOT to be used for urgent needs. For medical emergencies, dial 911. Now available from your iPhone and Android! Please provide this summary of care documentation to your next provider. Your primary care clinician is listed as Deneice Halsted. If you have any questions after today's visit, please call [phone].

## 2018-02-26 ENCOUNTER — CLINICAL SUPPORT (OUTPATIENT)
Dept: FAMILY MEDICINE CLINIC | Age: 48
End: 2018-02-26

## 2018-02-26 DIAGNOSIS — D32.0 BENIGN MENINGIOMA OF BRAIN (HCC): Primary | ICD-10-CM

## 2018-02-26 NOTE — MR AVS SNAPSHOT
Jia Clause 
 
 
 [address] 
[phone] Patient: Bao Cardenas MRN: [id number] Michele He Visit Information Date & Time Provider Department Dept. Phone Encounter #  
 2/26/2018  8:15 AM [facility] [phone] [id number] Your Appointments 2/26/2018  8:15 AM  
IMMUNIZATIONS/INJECTIONS with [facility] ([address]) Appt Note: Injection/has vial  
 [address] 2083 [facility] [place] [address] 6301 [facility] 9211 [facility] [place] [address] Upcoming Health Maintenance Date Due DTaP/Tdap/Td series (1 - Tdap) 8/31/1990 PAP AKA CERVICAL CYTOLOGY 8/31/1990 Allergies as of 2/26/2018  Review Complete On: 11/2/2017 By: Chapis Interiano MD  
  
 Severity Noted Reaction Type Reactions Pcn [Penicillins]  07/21/2015    Hives Current Immunizations  Never Reviewed No immunizations on file. Not reviewed this visit Vitals OB Status Smoking Status Having regular periods Never Smoker Your Updated Medication List  
  
   
This list is accurate as of 2/26/18  7:50 AM.  Always use your most recent med list.  
  
  
  
  
 AFINITOR 10 mg Tab Generic drug:  Everolimus Take 10 mg by mouth daily. cholecalciferol 1,000 unit Cap Commonly known as:  VITAMIN D3 Take 2,000 Units by mouth.  
  
 cyanocobalamin 500 mcg tablet Commonly known as:  VITAMIN B12 Take 1 Tab by mouth. flaxseed oil 1,000 mg Cap Take 2 Caps by mouth.  
  
 lamoTRIgine 100 mg tablet Commonly known as: LaMICtal  
Take 100 mg by mouth two (2) times a day. levothyroxine 25 mcg tablet Commonly known as:  SYNTHROID Take 1 Tab by mouth Daily (before breakfast). Indications: hypothyroidism magic mouthwash 142--40 mg/30 mL Mwsh oral suspension Commonly known as:  FIRST-MOUTHWASH BLM Take 10 mL by mouth every four (4) hours as needed. multivitamin tablet Commonly known as:  ONE A DAY Take 1 Tab by mouth daily. omega-3 acid ethyl esters 1 gram capsule Commonly known as:  Doc Dean Take 2 Caps by mouth. rosuvastatin 10 mg tablet Commonly known as:  CRESTOR Take 1 Tab by mouth nightly. SandoSTATIN LAR Depot 30 mg/2 mL Serr injection Generic drug:  octreotide acetate microspheres  
by Injection route every thirty (30) days. triamcinolone acetonide 0.1 % dental paste Commonly known as:  KENALOG  
by Dental route two (2) times a day. VITAMIN C 500 mg tablet Generic drug:  ascorbic acid (vitamin C) Take 500 mg by mouth daily. Introducing [facility] & HEALTH SERVICES! Dear Clarence Lainez: Thank you for requesting a LiveRail account. Our records indicate that you already have an active LiveRail account. You can access your account anytime at [email or web address] Did you know that you can access your hospital and ER discharge instructions at any time in LiveRail? You can also review all of your test results from your hospital stay or ER visit. Additional Information If you have questions, please visit the Frequently Asked Questions section of the LiveRail website at [email or web address] Remember, LiveRail is NOT to be used for urgent needs. For medical emergencies, dial 911. Now available from your iPhone and Android! Please provide this summary of care documentation to your next provider. Your primary care clinician is listed as Binh Curry. If you have any questions after today's visit, please call [phone].

## 2018-02-26 NOTE — PROGRESS NOTES
Patient here for injection of her Sandostatin LAR Depot, Lot #[id number], EXP 3/2020.  Given IM right gluteal. Candida Escobedo LPN/ EULOGIO Cid RN

## 2018-03-26 ENCOUNTER — CLINICAL SUPPORT (OUTPATIENT)
Dept: FAMILY MEDICINE CLINIC | Age: 48
End: 2018-03-26

## 2018-03-26 DIAGNOSIS — D32.0 BENIGN NEOPLASM OF CEREBRAL MENINGES (HCC): Primary | ICD-10-CM

## 2018-03-26 NOTE — MR AVS SNAPSHOT
[address] [place] [address] [address] 
[phone] Patient: Kevin Hagan MRN: [id number] [facility] Information Date & Time Provider Department Dept. Phone Encounter #  
 3/26/2018  7:30 AM [facility] [address] PRACTICE [phone] [id number] Upcoming Health Maintenance Date Due DTaP/Tdap/Td series (1 - Tdap) 8/31/1990 PAP AKA CERVICAL CYTOLOGY 8/31/1990 Allergies as of 3/26/2018  Review Complete On: 11/2/2017 By: Vani Cooper MD  
  
 Severity Noted Reaction Type Reactions Pcn [Penicillins]  07/21/2015    Hives Current Immunizations  Never Reviewed No immunizations on file. Not reviewed this visit Vitals OB Status Smoking Status Having regular periods Never Smoker Your Updated Medication List  
  
   
This list is accurate as of 3/26/18  7:41 AM.  Always use your most recent med list.  
  
  
  
  
 AFINITOR 10 mg Tab Generic drug:  Everolimus Take 10 mg by mouth daily. cholecalciferol 1,000 unit Cap Commonly known as:  VITAMIN D3 Take 2,000 Units by mouth.  
  
 cyanocobalamin 500 mcg tablet Commonly known as:  VITAMIN B12 Take 1 Tab by mouth. flaxseed oil 1,000 mg Cap Take 2 Caps by mouth.  
  
 lamoTRIgine 100 mg tablet Commonly known as: LaMICtal  
Take 100 mg by mouth two (2) times a day. levothyroxine 25 mcg tablet Commonly known as:  SYNTHROID Take 1 Tab by mouth Daily (before breakfast). Indications: hypothyroidism  
  
 magic mouthwash 547--40 mg/30 mL Mwsh oral suspension Commonly known as:  FIRST-MOUTHWASH BLM Take 10 mL by mouth every four (4) hours as needed. multivitamin tablet Commonly known as:  ONE A DAY Take 1 Tab by mouth daily. omega-3 acid ethyl esters 1 gram capsule Commonly known as:  Cherry Arthur Take 2 Caps by mouth. rosuvastatin 10 mg tablet Commonly known as:  CRESTOR Take 1 Tab by mouth nightly. SandoSTATIN LAR Depot 30 mg/2 mL Serr injection Generic drug:  octreotide acetate microspheres  
by Injection route every thirty (30) days. triamcinolone acetonide 0.1 % dental paste Commonly known as:  KENALOG  
by Dental route two (2) times a day. VITAMIN C 500 mg tablet Generic drug:  ascorbic acid (vitamin C) Take 500 mg by mouth daily. Introducing [facility] & HEALTH SERVICES! Dear Hannah Hirsch: Thank you for requesting a Wonderswamp account. Our records indicate that you already have an active Wonderswamp account. You can access your account anytime at [email or web address] Did you know that you can access your hospital and ER discharge instructions at any time in Wonderswamp? You can also review all of your test results from your hospital stay or ER visit. Additional Information If you have questions, please visit the Frequently Asked Questions section of the Wonderswamp website at [email or web address] Remember, Wonderswamp is NOT to be used for urgent needs. For medical emergencies, dial 911. Now available from your iPhone and Android! Please provide this summary of care documentation to your next provider. Your primary care clinician is listed as Epi Jacobsen. If you have any questions after today's visit, please call [phone].

## 2018-04-24 ENCOUNTER — CLINICAL SUPPORT (OUTPATIENT)
Dept: FAMILY MEDICINE CLINIC | Age: 48
End: 2018-04-24

## 2018-04-24 DIAGNOSIS — D32.0 BENIGN MENINGIOMA OF BRAIN (HCC): Primary | ICD-10-CM

## 2018-05-24 ENCOUNTER — CLINICAL SUPPORT (OUTPATIENT)
Dept: FAMILY MEDICINE CLINIC | Age: 48
End: 2018-05-24

## 2018-05-24 DIAGNOSIS — D32.0 BENIGN MENINGIOMA OF BRAIN (HCC): Primary | ICD-10-CM

## 2018-05-24 NOTE — MR AVS SNAPSHOT
[address] 
[phone] Patient: Marisol Figueroa MRN: [id number] Shirley Hong Visit Information Date & Time Provider Department Dept. Phone Encounter #  
 5/24/2018  7:30 AM [facility] [phone] [id number] Upcoming Health Maintenance Date Due DTaP/Tdap/Td series (1 - Tdap) 8/31/1990 PAP AKA CERVICAL CYTOLOGY 8/31/1990 Influenza Age 5 to Adult 8/1/2018 Allergies as of 5/24/2018  Review Complete On: 11/2/2017 By: Juliane Halsted, MD  
  
 Severity Noted Reaction Type Reactions Pcn [Penicillins]  07/21/2015    Hives Current Immunizations  Never Reviewed No immunizations on file. Not reviewed this visit Vitals OB Status Smoking Status Having regular periods Never Smoker Your Updated Medication List  
  
   
This list is accurate as of 5/24/18  7:47 AM.  Always use your most recent med list.  
  
  
  
  
 AFINITOR 10 mg Tab Generic drug:  Everolimus Take 10 mg by mouth daily. cholecalciferol 1,000 unit Cap Commonly known as:  VITAMIN D3 Take 2,000 Units by mouth.  
  
 cyanocobalamin 500 mcg tablet Commonly known as:  VITAMIN B12 Take 1 Tab by mouth. flaxseed oil 1,000 mg Cap Take 2 Caps by mouth.  
  
 lamoTRIgine 100 mg tablet Commonly known as: LaMICtal  
Take 100 mg by mouth two (2) times a day. levothyroxine 25 mcg tablet Commonly known as:  SYNTHROID Take 1 Tab by mouth Daily (before breakfast). Indications: hypothyroidism  
  
 magic mouthwash 010--40 mg/30 mL Mwsh oral suspension Commonly known as:  FIRST-MOUTHWASH BLM Take 10 mL by mouth every four (4) hours as needed. multivitamin tablet Commonly known as:  ONE A DAY Take 1 Tab by mouth daily. omega-3 acid ethyl esters 1 gram capsule Commonly known as:  Vignesh Shah Take 2 Caps by mouth. rosuvastatin 10 mg tablet Commonly known as:  CRESTOR Take 1 Tab by mouth nightly. SandoSTATIN LAR Depot 30 mg/2 mL Serr injection Generic drug:  octreotide acetate microspheres  
by Injection route every thirty (30) days. triamcinolone acetonide 0.1 % dental paste Commonly known as:  KENALOG  
by Dental route two (2) times a day. VITAMIN C 500 mg tablet Generic drug:  ascorbic acid (vitamin C) Take 500 mg by mouth daily. Introducing [facility] & [facility] SERVICES! Dear Heike Long: Thank you for requesting a Pressly account. Our records indicate that you already have an active Pressly account. You can access your account anytime at [email or web address]. [email or web address] [email or web address] Did you know that you can access your hospital and ER discharge instructions at any time in Pressly? You can also review all of your test results from your hospital stay or ER visit. Additional Information If you have questions, please visit the Frequently Asked Questions section of the Pressly website at [email or web address]/Flow [email or web address]/. Remember, Pressly is NOT to be used for urgent needs. For medical emergencies, dial 911. Now available from your iPhone and Android! Please provide this summary of care documentation to your next provider. If you have any questions after today's visit, please call [phone].

## 2018-06-26 ENCOUNTER — CLINICAL SUPPORT (OUTPATIENT)
Dept: FAMILY MEDICINE CLINIC | Age: 48
End: 2018-06-26

## 2018-06-26 DIAGNOSIS — D32.0 BENIGN MENINGIOMA OF BRAIN (HCC): Primary | ICD-10-CM

## 2018-06-26 NOTE — PROGRESS NOTES
Patient here for her monthly Sandoststin LAR Depot injection given IM in left gluteal, tolerated with moderate pain at injection site.

## 2018-06-26 NOTE — MR AVS SNAPSHOT
[address] [place] [address] [address] 
[phone] Patient: Fran Barney MRN: [id number] Nayamichelle Rojas Visit Information Date & Time Provider Department Dept. Phone Encounter #  
 6/26/2018  3:30 PM [address] [phone] [id number] Upcoming Health Maintenance Date Due DTaP/Tdap/Td series (1 - Tdap) 8/31/1990 PAP AKA CERVICAL CYTOLOGY 8/31/1990 Influenza Age 5 to Adult 8/1/2018 Allergies as of 6/26/2018  Review Complete On: 11/2/2017 By: Erin Curran MD  
  
 Severity Noted Reaction Type Reactions Pcn [Penicillins]  07/21/2015    Hives Current Immunizations  Never Reviewed No immunizations on file. Not reviewed this visit You Were Diagnosed With   
  
 Codes Comments [facility]    -  Primary ICD-10-CM: D32.0 ICD-9-CM: 225.2 Vitals OB Status Smoking Status Having regular periods Never Smoker Your Updated Medication List  
  
   
This list is accurate as of 6/26/18  4:04 PM.  Always use your most recent med list.  
  
  
  
  
 AFINITOR 10 mg Tab Generic drug:  Everolimus Take 10 mg by mouth daily. cholecalciferol 1,000 unit Cap Commonly known as:  VITAMIN D3 Take 2,000 Units by mouth.  
  
 cyanocobalamin 500 mcg tablet Commonly known as:  VITAMIN B12 Take 1 Tab by mouth. flaxseed oil 1,000 mg Cap Take 2 Caps by mouth.  
  
 lamoTRIgine 100 mg tablet Commonly known as: LaMICtal  
Take 100 mg by mouth two (2) times a day. levothyroxine 25 mcg tablet Commonly known as:  SYNTHROID Take 1 Tab by mouth Daily (before breakfast). Indications: hypothyroidism  
  
 magic mouthwash 445--40 mg/30 mL Mwsh oral suspension Commonly known as:  FIRST-MOUTHWASH BLM Take 10 mL by mouth every four (4) hours as needed. multivitamin tablet Commonly known as:  ONE A DAY Take 1 Tab by mouth daily. omega-3 acid ethyl esters 1 gram capsule Commonly known as:  Jose Lapine Take 2 Caps by mouth. rosuvastatin 10 mg tablet Commonly known as:  CRESTOR Take 1 Tab by mouth nightly. SandoSTATIN LAR Depot 30 mg/2 mL Serr injection Generic drug:  octreotide acetate microspheres  
by Injection route every thirty (30) days. triamcinolone acetonide 0.1 % dental paste Commonly known as:  KENALOG  
by Dental route two (2) times a day. VITAMIN C 500 mg tablet Generic drug:  ascorbic acid (vitamin C) Take 500 mg by mouth daily. We Performed the Following [place] THER/PROPH/DIAG INJECTION, SUBCUT/IM [id number] CPT(R)] Introducing [facility] SERVICES! Dear Minh Dennison: Thank you for requesting a Visiogen account. Our records indicate that you already have an active Visiogen account. You can access your account anytime at [email or web address]. Mandy & Pandy/Enforcer eCoaching Did you know that you can access your hospital and ER discharge instructions at any time in Visiogen? You can also review all of your test results from your hospital stay or ER visit. Additional Information If you have questions, please visit the Frequently Asked Questions section of the Visiogen website at [email or web address] eCoaching. Mandy & Pandy/Enforcer eCoaching/. Remember, Visiogen is NOT to be used for urgent needs. For medical emergencies, dial 911. Now available from your iPhone and Android! Please provide this summary of care documentation to your next provider. If you have any questions after today's visit, please call [phone].

## 2018-08-24 ENCOUNTER — CLINICAL SUPPORT (OUTPATIENT)
Dept: FAMILY MEDICINE CLINIC | Age: 48
End: 2018-08-24

## 2018-08-24 DIAGNOSIS — D32.0 BENIGN MENINGIOMA OF BRAIN (HCC): Primary | ICD-10-CM

## 2018-08-24 NOTE — PROGRESS NOTES
Patient here for her Sandostatin  Lar Depot 30 mg injection  Lot# [id number], naovartis exp 9-, given IM in left gluteal [place]. Tolerated without bleeding or excessive pain.

## 2018-08-24 NOTE — MR AVS SNAPSHOT
[address] 
[phone] Patient: Edmundo Headley MRN: [id number] Jas Rivas Visit Information Date & Time Provider Department Dept. Phone Encounter #  
 8/24/2018  2:40 PM [address] [phone] [id number] Upcoming Health Maintenance Date Due DTaP/Tdap/Td series (1 - Tdap) 8/31/1990 PAP AKA CERVICAL CYTOLOGY 8/31/1990 Influenza Age 5 to Adult 8/1/2018 Allergies as of 8/24/2018  Review Complete On: 8/24/2018 By: Julia Olivia RN Severity Noted Reaction Type Reactions Pcn [Penicillins]  07/21/2015    Hives Current Immunizations  Never Reviewed No immunizations on file. Not reviewed this visit You Were Diagnosed With   
  
 Codes Comments Benign meningioma of brain ([facility]    -  Primary ICD-10-CM: D32.0 ICD-9-CM: 225.2 Vitals OB Status Smoking Status Having regular periods Never Smoker Your Updated Medication List  
  
   
This list is accurate as of 8/24/18  3:03 PM.  Always use your most recent med list.  
  
  
  
  
 AFINITOR 10 mg Tab Generic drug:  Everolimus Take 10 mg by mouth daily. cholecalciferol 1,000 unit Cap Commonly known as:  VITAMIN D3 Take 2,000 Units by mouth.  
  
 cyanocobalamin 500 mcg tablet Commonly known as:  VITAMIN B12 Take 1 Tab by mouth. flaxseed oil 1,000 mg Cap Take 2 Caps by mouth.  
  
 lamoTRIgine 100 mg tablet Commonly known as: LaMICtal  
Take 100 mg by mouth two (2) times a day. levothyroxine 25 mcg tablet Commonly known as:  SYNTHROID Take 1 Tab by mouth Daily (before breakfast). Indications: hypothyroidism  
  
 magic mouthwash 088--40 mg/30 mL Mwsh oral suspension Commonly known as:  FIRST-MOUTHWASH BLM Take 10 mL by mouth every four (4) hours as needed. multivitamin tablet Commonly known as:  ONE A DAY Take 1 Tab by mouth daily. omega-3 acid ethyl esters 1 gram capsule Commonly known as:  Peggi Mcclain Take 2 Caps by mouth. rosuvastatin 10 mg tablet Commonly known as:  CRESTOR Take 1 Tab by mouth nightly. SandoSTATIN LAR Depot 30 mg/2 mL Serr injection Generic drug:  octreotide acetate microspheres  
by Injection route every thirty (30) days. triamcinolone acetonide 0.1 % dental paste Commonly known as:  KENALOG  
by Dental route two (2) times a day. VITAMIN C 500 mg tablet Generic drug:  ascorbic acid (vitamin C) Take 500 mg by mouth daily. We Performed the Following CT THER/PROPH/DIAG INJECTION, SUBCUT/IM [id number] CPT(R)] Introducing [facility] & HEALTH SERVICES! Dear John Macias: Thank you for requesting a Hintsoft account. Our records indicate that you already have an active Hintsoft account. You can access your account anytime at [email or web address] Did you know that you can access your hospital and ER discharge instructions at any time in Hintsoft? You can also review all of your test results from your hospital stay or ER visit. Additional Information If you have questions, please visit the Frequently Asked Questions section of the Hintsoft website at [email or web address] Remember, Hintsoft is NOT to be used for urgent needs. For medical emergencies, dial 911. Now available from your iPhone and Android! Please provide this summary of care documentation to your next provider. If you have any questions after today's visit, please call [phone].

## 2018-11-07 RX ORDER — ROSUVASTATIN CALCIUM 10 MG/1
TABLET, COATED ORAL
Qty: 90 TAB | Refills: 4 | Status: SHIPPED | OUTPATIENT
Start: 2018-11-07 | End: 2019-11-24 | Stop reason: SDUPTHER

## 2019-11-25 RX ORDER — ROSUVASTATIN CALCIUM 10 MG/1
TABLET, COATED ORAL
Qty: 90 TAB | Refills: 4 | Status: SHIPPED | OUTPATIENT
Start: 2019-11-25 | End: 2019-11-27 | Stop reason: SDUPTHER

## 2021-03-10 ENCOUNTER — HOSPITAL ENCOUNTER (OUTPATIENT)
Dept: PHYSICAL THERAPY | Age: 51
Discharge: HOME | End: 2021-03-10
Payer: COMMERCIAL

## 2021-03-10 PROCEDURE — 92507 TX SP LANG VOICE COMM INDIV: CPT

## 2021-03-10 NOTE — PROGRESS NOTES
Speech Language Pathology: Daily Note      Patient Name: Mono Trevino   3/10/2021   : 1970  [x]  Patient  Verified  Payor: Jacques Norrisling / Plan: Petra Lundberg [id number] PPO / Product Type: PPO /   In time: 3:35pm  Out time:4:35pm  Total Treatment Time (min): 60 min  1:1 Treatment Time ( Only): 60 min  Visit #: 15 of 30    SUBJECTIVE  Pain Level (0-10 scale): 0    Subjective functional status/changes:   \"we're doing good! \"     OBJECTIVE  Treatment provided includes the following. Increase/Improve:  []  Voice Quality [x]  Expressive Language []  Oral Motor Skills   []  Vocal Loudness []  Auditory Comprehension []  Eating/Swallowing Skills   []  Vocal Cord Function []  Writing Skills []  Laryngeal/Pharyngeal Function   []  Resonance []  Reading Comprehension []   []  Breath Support/Coord. [x]  Cognitive-Linguistic Skills []   []  Speech Intelligibility []  Safety Awareness []   []  Articulation []  Attention []   []  Fluency [x]  Memory []     Decrease:  []  Dysphagia []  Apraxia []  Dysphonia   []  Dysarthria []  Dysfluency [x]  Cognitive Ling. Deficit   [x]  Aphasia []  Vocal Cord Dysfunction []  Dysphonia    Progression towards goals can be found in tasks completed in therapy (bold face type):   Clinician and pt reviewed her homework. Pt did quite well.  I'ly for generating desired word.  required SBA, and 16 her  provided Haily. Second word-finding task pt generated . She required Haily for 2/18, and maxA for 3/18. Clinician transitioned to a memory task. Pt required SBA-Haily to utilize internal memory strategy of visualization. Pt immediately recalled 3/8 with SBA, 3/8 with Haily of semantic cues, and 2/8 with maxA. After approximately a 15 min delay, the pt recalled 5/8 pieces independently, 1/8 with Haily of semantic cues, 1/8 with modA of semantic and phonemic cues, and 1/8 of maxA from the clinician.   Clinician transitioned to a word-finding task that is typically difficult for her. She required modA of semantic and phonemic cues for 5/10, and maxA for 5/10. Lastly we transitioned to problem solving. The pt was able to think through an issue for 5/8 independently, and required Haily for 1/8, and maxA for 1/8.     HEP: Provided    Patient/Caregiver instruction/education: Pt was re-educated on internal memory strategy and HEP    Pain Level (0-10 scale) post treatment: 0    ASSESSMENT  []   Improving appropriately and progressing toward goals  [x]   Improving slowly and progressing toward goals  []   Approximating goals/maximum potential  [x]   Continues to benefit from skilled therapy to address remaining functional deficits  []   Not progressing toward goals and plan of care will be adjusted    PLAN   [x]  Continue Plan of Care    []  See progress note/recertification  []  Upgrade activities as tolerated      []  Discharge due to:  []  Other:    Oral Blas, SLP   3/10/2021,

## 2021-03-12 ENCOUNTER — HOSPITAL ENCOUNTER (OUTPATIENT)
Dept: PHYSICAL THERAPY | Age: 51
Discharge: HOME | End: 2021-03-12
Payer: COMMERCIAL

## 2021-03-12 PROCEDURE — 92507 TX SP LANG VOICE COMM INDIV: CPT

## 2021-03-12 NOTE — PROGRESS NOTES
Speech Language Pathology: Daily Note      Patient Name: Cathie Ray   3/12/2021   : 1970  [x]  Patient  Verified  Payor: Wendy De La Cruz / Plan: Petra Lundberg [id number] PPO / Product Type: PPO /   In time: 3:30  Out time:4:15pm  Total Treatment Time (min): 45 min  1:1 Treatment Time ( Only): 45 min  Visit #: 16 of 30    SUBJECTIVE  Pain Level (0-10 scale): 0    Subjective functional status/changes:   \"we're good. I made cookies today. \"     OBJECTIVE  Treatment provided includes the following. Increase/Improve:  []  Voice Quality [x]  Expressive Language []  Oral Motor Skills   []  Vocal Loudness []  Auditory Comprehension []  Eating/Swallowing Skills   []  Vocal Cord Function []  Writing Skills []  Laryngeal/Pharyngeal Function   []  Resonance []  Reading Comprehension []   []  Breath Support/Coord. [x]  Cognitive-Linguistic Skills []   []  Speech Intelligibility []  Safety Awareness []   []  Articulation []  Attention []   []  Fluency [x]  Memory []     Decrease:  []  Dysphagia []  Apraxia []  Dysphonia   []  Dysarthria []  Dysfluency [x]  Cognitive Ling. Deficit   [x]  Aphasia []  Vocal Cord Dysfunction []  Dysphonia       Progression towards goals can be found in tasks completed in therapy (bold face type): The pt only struggled with 1 piece of the word-finding homework she was provided. The pt was then provided Haily with visualization of memory task. Pt immediately recalled 9/9 pieces of information from the paragraph read. After a 15 min delay, the pt recalled 8/9 pieces independently, and 1/9 with maxA. A difficult word-finding task was then provided. Pt was provided a phrase and asked to generate the word. She generated 2/10 with Haily, 2/10 with modA of semantic and phonemic cues, and 6/10 with maxA. With wait time, pt is able to word find easier during conversation. Pt was then provided a basic map of a town. She independently answered 2/5 questions about the map.  She required Haily for 1/5 as not enough information was provided, and required maxA for 2/5. Pt was able to provide directions from the rehab clinic to her home with Haily. HEP: provided    Patient/Caregiver instruction/education: Pt educated on errors in relation to spatial orientation, internal memory strategies, as well as homework plan.       Pain Level (0-10 scale) post treatment: 0    ASSESSMENT  []   Improving appropriately and progressing toward goals  [x]   Improving slowly and progressing toward goals  []   Approximating goals/maximum potential  [x]   Continues to benefit from skilled therapy to address remaining functional deficits  []   Not progressing toward goals and plan of care will be adjusted    PLAN   [x]  Continue Plan of Care    []  See progress note/recertification  []  Upgrade activities as tolerated      []  Discharge due to:  []  Other:    AVIS [place]   3/12/2021,

## 2021-03-16 ENCOUNTER — HOSPITAL ENCOUNTER (OUTPATIENT)
Dept: PHYSICAL THERAPY | Age: 51
Discharge: HOME | End: 2021-03-16
Payer: COMMERCIAL

## 2021-03-16 PROCEDURE — 92507 TX SP LANG VOICE COMM INDIV: CPT

## 2021-03-16 NOTE — PROGRESS NOTES
Speech Language Pathology: Daily Note      Patient Name: Nia Kohler   3/16/2021   : 1970  [x]  Patient  Verified  Payor: Janna Ramos / Plan: Petra Lundberg [id number] PPO / Product Type: PPO /   In time: 3:35pm  Out time: 4:20pm  Total Treatment Time (min): 45 min  1:1 Treatment Time (MC Only): 45 min  Visit #: 17 of 30    SUBJECTIVE  Pain Level (0-10 scale): 0    Subjective functional status/changes: \"Eh, we're doing okay. It's not very nice out. \"     OBJECTIVE  Treatment provided includes the following. Increase/Improve:  []  Voice Quality [x]  Expressive Language []  Oral Motor Skills   []  Vocal Loudness []  Auditory Comprehension []  Eating/Swallowing Skills   []  Vocal Cord Function []  Writing Skills []  Laryngeal/Pharyngeal Function   []  Resonance []  Reading Comprehension []   []  Breath Support/Coord. [x]  Cognitive-Linguistic Skills []   []  Speech Intelligibility []  Safety Awareness []   []  Articulation []  Attention []   []  Fluency [x]  Memory []     Decrease:  []  Dysphagia []  Apraxia []  Dysphonia   []  Dysarthria []  Dysfluency [x]  Cognitive Ling. Deficit   [x]  Aphasia []  Vocal Cord Dysfunction []  Dysphonia     Progression towards goals can be found in tasks completed in therapy (bold face type): The pt provided her homework which she did fairly well with. Clinician discussed answers with pt, and minimal errors produced. 13/15 matching word to description, and 15/15 independently providing word for descriptions. Clinician switched to memory. Pt was able to underline important information in the paragraph, and then required Haily to utilize visualization. Pt immediately recalled 7/8 pieces of information independently, and 1/8 with Haily. After a 18 min delay, the pt recalled 8/8 pieces of information independently, though she displayed a bit of difficulty with word finding and required a verbal cue to utilize the strategy of describing.      Pt then worked on one of the more complex word-finding tasks today. Pt independently generated 4/10! This is her first time being able to independently provide information for this type of task. She required Haily for 2/10, and maxA for 4/10. Pt was then asked to think of an item not in the room and describe using strategies. Pt required SBA to provide function first, and then described object fairly well independently, where clinician was able to accurately guess what pt was referencing. HEP: provided    Patient/Caregiver instruction/education: Pt educated on errors with different tasks, as well as HEP.     Pain Level (0-10 scale) post treatment: 0    ASSESSMENT  []   Improving appropriately and progressing toward goals  [x]   Improving slowly and progressing toward goals  []   Approximating goals/maximum potential  []   Continues to benefit from skilled therapy to address remaining functional deficits  []   Not progressing toward goals and plan of care will be adjusted    PLAN   [x]  Continue Plan of Care    []  See progress note/recertification  []  Upgrade activities as tolerated      []  Discharge due to:  []  Other:    AVIS Flores   3/16/2021,

## 2021-03-17 ENCOUNTER — HOSPITAL ENCOUNTER (OUTPATIENT)
Dept: PHYSICAL THERAPY | Age: 51
Discharge: HOME | End: 2021-03-17
Payer: COMMERCIAL

## 2021-03-17 PROCEDURE — 92507 TX SP LANG VOICE COMM INDIV: CPT

## 2021-03-17 NOTE — PROGRESS NOTES
Speech Language Pathology: Daily Note      Patient Name: Koki Khan   3/17/2021   : 1970  [x]  Patient  Verified  Payor: Kinsey Reuben / Plan: Petra Lundberg [id number] PPO / Product Type: PPO /   In time: 3:30pm  Out time:4:15pm  Total Treatment Time (min): 45 min  1:1 Treatment Time (MC Only): 45 min  Visit #: 18 of 30    SUBJECTIVE  Pain Level (0-10 scale): 0    Subjective functional status/changes: \"Yeah it's kind of rainy outside. \"     OBJECTIVE  Treatment provided includes the following. Increase/Improve:  []  Voice Quality [x]  Expressive Language []  Oral Motor Skills   []  Vocal Loudness []  Auditory Comprehension []  Eating/Swallowing Skills   []  Vocal Cord Function []  Writing Skills []  Laryngeal/Pharyngeal Function   []  Resonance []  Reading Comprehension []   []  Breath Support/Coord. [x]  Cognitive-Linguistic Skills []   []  Speech Intelligibility []  Safety Awareness []   []  Articulation []  Attention []   []  Fluency [x]  Memory []     Decrease:  []  Dysphagia []  Apraxia []  Dysphonia   []  Dysarthria []  Dysfluency [x]  Cognitive Ling. Deficit   []  Aphasia []  Vocal Cord Dysfunction []  Dysphonia   Progression towards goals can be found in tasks completed in therapy (bold face type):   Pt had no homework to return as she was here yesterday due to clinician being out later the rest of the week. Pt was provided memory task. She was able to determine the important pieces, and with Haily report visualizations. Pt immediately recalled  I'ly, 2/ with SBA, 1 with Haily and 1 with maxA. After a 20 min delay, the pt recalled 7/ I'ly, 1/ with SBA, 3/11 with maxA. The pt was provided a complex word finding task again today. She independently generated the word for 4/10 trials independently, she required Haily for 2/10, Rishi Piña for 2/10, and maxA for 2/10. Pt was then asked to organized cards to tell a story.  Pt struggled to attend to the detail to accurately organize today as 2 cards were out of order and pt required modA to place them, however pt then told the story well going forward, and easily today moving backward saying what happened before the previous task. HEP: Provided    Patient/Caregiver instruction/education: Pt re-educated on internal memory strategies, to attend to details to determine information, and HEP.     Pain Level (0-10 scale) post treatment: 0    ASSESSMENT  []   Improving appropriately and progressing toward goals  [x]   Improving slowly and progressing toward goals  []   Approximating goals/maximum potential  [x]   Continues to benefit from skilled therapy to address remaining functional deficits  []   Not progressing toward goals and plan of care will be adjusted    PLAN   [x]  Continue Plan of Care    []  See progress note/recertification  []  Upgrade activities as tolerated      []  Discharge due to:  []  Other:    Leonor Roman, SLP   3/17/2021,

## 2021-03-17 NOTE — PROGRESS NOTES
[facility]  [address] [facility], [facility], [address] [facility]:  [address]: [phone]    Medicare Progress Report    Patient name: Gage Gallegos Start of Care: 2021   Referral source: Lars Bautista* : 1970   Medical/Treatment Diagnosis: Aphasia due to old intracerebral hemorrhage [I69.120] Onset Date:2021     Prior Hospitalization: see medical history Provider#: [id number]   Medications: Verified on Patient Summary List    Comorbidities: CVA, brain tumor, maningioma  Prior Level of Function: Independent prior to tumor and CVA, however pt was returning to therapy after a break for awhile, and at that point pt's  was doing all driving, finances, assisting pt with transfers in and out of shower, and assisting with balance during ambulation at times. He also assisted with communication and proving information for appointments and with family and friends, due to pt's aphasia. Visits from Start of Care: 18    Missed Visits: 0- if pt had to cancel due to 's work, they gave advanced notice and typically reschedule. The family is very good at participating in therapy, and pt always does her homework. Reporting Period: 2021 to 2021    Subjective Reports: \"I'm doing pretty good. \"  Short Term Goals: To be accomplished in 5 weeks    1. When asked spatial orientation questions, the pt will independently respond with an appropriate answer for 8/10 trials to increase appropriate communication with others in relation to events. Goal partially met: When provided a map, the pt is able to accurately answer questions with an average of approximately 80% accuracy independently.  On occasion there is a question that causes confusion.     Update: When asked spatial orientation questions, the pt will independently respond with an appropriate answer for 85% of trials to increase appropriate communication with others in relation to events. 2. The pt will complete word-finding tasks with 60% accuracy with Haily to increase her ability to participate in conversation. Goal partially met: The pt has met this goal for easy and moderate level tasks, at approximately 80% accuracy. With complex word-finding tasks the pt is displaying approximately 40% accuracy independently, and up to 60% accuracy with Haily. Update: The pt will complete more complex word-finding tasks with 70% accuracy with SBA to increase her ability to participate in conversation. 3. The pt will independently organize 6 steps to complete a task for 6/8 trials to increase her independence with ADL. Goal met: The pt is typically able to accurately organize 6 steps. At times she produces 1-2 errors. Today pt had to attend to small detail to determine the order, and struggled to place 2 cards. Pt has then been telling the story forward with independence, and now is telling the story backward and what happened before the task provided. Update: The pt will complete reasoning tasks with 70% accuracy independently to increase her ability to solve problems.     4. When provided an object, the pt will be able to provide 3 characteristics of the object with 50% accuracy with Haily. Goal met: The pt frequently forgets to provide function first, however is now describing with fairly good accuracy independently. Pt only requires an occasional verbal cue from clinician to correct a descriptor provided. Clinician is typically about to guess what the pt is referencing with her description.     Update: The pt will describe with SBA, including use of function during description, to increase her ability to continue conversation when she is unable to independently generate the desired word. 5. The pt will complete verbal problem solving tasks with 60% accuracy with Haily to increase her ability to complete ADL with increased independence. Goal met:  The pt has not spent as much time on this goal as most of the others. Pt is 80% accurate with Haily at this time. Update: The pt will complete verbal problem solving tasks with 90% accuracy with Haily to increase her ability to complete ADL with increased independence.     6. The pt will utilize external memory strategies with Haily to increase independence at home with recalling information. Goal partially met: the pt and her  who attends every session, reported she is utilizing the calendar. They reported it is working well for the pt. Update: None. Discontinue goal.     7. The pt will utilize internal memory strategies with Haily to independently recall 50% of information after a 5 min delay in time. Goal met: The pt typically only requires Haily to utilize the internal memory strategies. She is able to recall % of items from a grocery store, to-dos, and now information from a paragraph of written information for a delay of even 15 minutes. Clinician is working recalling information with a longer period of time, and then will transition to recall of information she hears. Update: The pt will utilize internal memory strategies with Haily to independently recall 80% of information after a 20 min delay in time to increase her ability to complete ADL.    Long Term Goals: To be accomplished in 15 weeks             1. The pt will utilize internal and external memory strategies to increase her independence with ADL. Goal partially met: the pt is increasing her independence, but continues to require practice utilizing the strategies.     2. The pt will increase her language skills to increase her ability to participate in conversation.    Goal partially met: the pt is increasing her skills with circumlocution, and is able to generate more of the words she wishes to stay, but continues to require cuing and time working on word-finding tasks.        Problems/ barriers to goal attainment: aphasia Assessment / Recommendations: Continue speech pathology services. Progress is slow, but pt is working hard, doing well, and making gains. Problem List:    [x]aphasic  []dysarthric  []dysphagic       []alexic  []agraphic  []dysphonia       []dysfluency   [x]Cognitive-Linguistic Disorder       []other        Treatment Plan: Aphasia Treatment and Cognitive/Language Treatment    Patient Goal (s) has been updated and includes: the updates seen above. Updated Goals to be accomplished in 5 weeks:    Frequency / Duration: Patient to be seen 2 times per week for a total of 15 weeks:      AVIS Sylvester 3/17/2021     NOTE TO PHYSICIAN:  Please complete the following and fax to:   [facility] at [phone]    . Retain this original for your records. If you are unable to process this request in   24 hours, please contact our office.      ____ I have read the above report and request that my patient continue therapy with the following changes/special instructions:  ____ I have read the above report and request that my patient be discharged from therapy    Physician's Signature:_____________________ Date:___________Time:__________

## 2021-03-24 ENCOUNTER — HOSPITAL ENCOUNTER (OUTPATIENT)
Dept: INFUSION THERAPY | Age: 51
Discharge: HOME | End: 2021-03-24
Payer: COMMERCIAL

## 2021-03-24 ENCOUNTER — HOSPITAL ENCOUNTER (OUTPATIENT)
Dept: PHYSICAL THERAPY | Age: 51
Discharge: HOME | End: 2021-03-24
Payer: COMMERCIAL

## 2021-03-24 VITALS
OXYGEN SATURATION: 98 % | BODY MASS INDEX: 24.89 KG/M2 | DIASTOLIC BLOOD PRESSURE: 74 MMHG | HEART RATE: 70 BPM | WEIGHT: 149.6 LBS | SYSTOLIC BLOOD PRESSURE: 120 MMHG | TEMPERATURE: 98.1 F | RESPIRATION RATE: 17 BRPM

## 2021-03-24 PROCEDURE — 96523 IRRIG DRUG DELIVERY DEVICE: CPT

## 2021-03-24 PROCEDURE — 77030012965 HC NDL HUBR BBMI -A

## 2021-03-24 PROCEDURE — 74011250636 HC RX REV CODE- 250/636: Performed by: INTERNAL MEDICINE

## 2021-03-24 PROCEDURE — 92507 TX SP LANG VOICE COMM INDIV: CPT

## 2021-03-24 RX ORDER — HEPARIN 100 UNIT/ML
500 SYRINGE INTRAVENOUS AS NEEDED
Status: DISCONTINUED | OUTPATIENT
Start: 2021-03-24 | End: 2021-03-25 | Stop reason: HOSPADM

## 2021-03-24 RX ORDER — SODIUM CHLORIDE 0.9 % (FLUSH) 0.9 %
5-10 SYRINGE (ML) INJECTION AS NEEDED
Status: DISCONTINUED | OUTPATIENT
Start: 2021-03-24 | End: 2021-03-25 | Stop reason: HOSPADM

## 2021-03-24 RX ADMIN — Medication 500 UNITS: at 14:45

## 2021-03-24 RX ADMIN — Medication 10 ML: at 14:44

## 2021-03-24 NOTE — PROGRESS NOTES
Speech Language Pathology: Daily Note      Patient Name: Leonor Handy   3/24/2021   : 1970  [x]  Patient  Verified  Payor: Can Gandhi / Plan: Petra Lundberg [id number] PPO / Product Type: PPO /   In time:   Out time:163  Total Treatment Time (min): 59 min  1:1 Treatment Time (MC Only): 59 min  Visit #:  30    SUBJECTIVE  Pain Level (0-10 scale): 0    Subjective functional status/changes:   \"we're good! \"     OBJECTIVE  Treatment provided includes the following. Increase/Improve:  []  Voice Quality [x]  Expressive Language []  Oral Motor Skills   []  Vocal Loudness []  Auditory Comprehension []  Eating/Swallowing Skills   []  Vocal Cord Function []  Writing Skills []  Laryngeal/Pharyngeal Function   []  Resonance []  Reading Comprehension []   []  Breath Support/Coord. [x]  Cognitive-Linguistic Skills []   []  Speech Intelligibility []  Safety Awareness []   []  Articulation []  Attention []   []  Fluency [x]  Memory []     Decrease:  []  Dysphagia []  Apraxia []  Dysphonia   []  Dysarthria []  Dysfluency [x]  Cognitive Ling. Deficit   [x]  Aphasia []  Vocal Cord Dysfunction []  Dysphonia   Progression towards goals can be found in tasks completed in therapy (bold face type):   Clinician reviewed pt's returned homework. Language task: 11/15 I'ly, and  I'ly. Organization task: 100% I'ly. Today pt struggled with the memory task. She was able to provide internal memory strategy with SBA-Haily. Immediate recall: 3/11 I'ly,  with SBA,  with Haily,  with modA, and 3/11 with maxA. After a 10 min delay: 3/11 I'ly, 3/11 with Haily,  with modA, and  with maxA. Pt was provided a description for a complex language task, and first letter and was asked to generate the word. 1/10 I'ly, 3/10 with Haily of semantic cue, 3/10 with modA of semantic and then phonemic cue, and 3/10 with maxA. Second task:  I'ly, 2 with modA of semantic and phonemic cue, and /5 with maxA.     HEP: language task provided    Patient/Caregiver instruction/education: Pt educated on internal memory strategy piece she missed, and the HEP.     Pain Level (0-10 scale) post treatment: 0    ASSESSMENT  []   Improving appropriately and progressing toward goals  [x]   Improving slowly and progressing toward goals  []   Approximating goals/maximum potential  [x]   Continues to benefit from skilled therapy to address remaining functional deficits  []   Not progressing toward goals and plan of care will be adjusted    PLAN   [x]  Continue Plan of Care    []  See progress note/recertification  []  Upgrade activities as tolerated      []  Discharge due to:  []  Other:    Gilmer Mccormick, SLP   3/24/2021,

## 2021-03-24 NOTE — PROGRESS NOTES
[facility] Progress Note    Date: 2021    Name: Nicole Saenz    MRN: [id number]         : 1970    Monthly Port flush     Ms. Veronica Webber was assessed and education was provided. She denies new problems. Memory issues noted and word finding difficulty at times. Ms. Pham's vitals were reviewed and patient was observed for 5 minutes prior to procedure. Visit Vitals  /74 (BP 1 Location: Left upper arm, BP Patient Position: Sitting)   Pulse 70   Temp 98.1 °F (36.7 °C)   Resp 17   Wt 67.9 kg (149 lb 9.6 oz)   SpO2 98%   BMI 24.89 kg/m²       Mediport was accessed with 20 gauge, 1 inch florez after chloroprep.    right chest, single  Blood return: YES    Flushed 10 ml of NS followed by Heparin 500u    Florez needle removed and site intact. Band-Aid applied. Ms. Veronica Webber tolerated the procedure, and had no complaints. Ms. Veronica Webber was discharged from [place] [address] in stable condition at 1450. She is to return on  at 3 pm for her next appointment.     Jenny Shaw RN  2021  2:59 PM

## 2021-03-26 ENCOUNTER — HOSPITAL ENCOUNTER (OUTPATIENT)
Dept: PHYSICAL THERAPY | Age: 51
Discharge: HOME | End: 2021-03-26
Payer: COMMERCIAL

## 2021-03-26 PROCEDURE — 92507 TX SP LANG VOICE COMM INDIV: CPT

## 2021-03-26 NOTE — PROGRESS NOTES
Speech Language Pathology: Daily Note      Patient Name: Dina Garcia   3/26/2021   : 1970  [x]  Patient  Verified  Payor: Shantel Huber / Plan: Petra Lundberg [id number] PPO / Product Type: PPO /   In time: 1530pm  Out time:1620  Total Treatment Time (min): 50 min  1:1 Treatment Time (MC Only): 50 min  Visit #: 20 of 30    SUBJECTIVE  Pain Level (0-10 scale): 0    Subjective functional status/changes:   \"We're good! \"     OBJECTIVE  Treatment provided includes the following. Increase/Improve:  []  Voice Quality [x]  Expressive Language []  Oral Motor Skills   []  Vocal Loudness []  Auditory Comprehension []  Eating/Swallowing Skills   []  Vocal Cord Function []  Writing Skills []  Laryngeal/Pharyngeal Function   []  Resonance []  Reading Comprehension []   []  Breath Support/Coord. [x]  Cognitive-Linguistic Skills []   []  Speech Intelligibility []  Safety Awareness []   []  Articulation []  Attention []   []  Fluency []  Memory []     Decrease:  []  Dysphagia []  Apraxia []  Dysphonia   []  Dysarthria []  Dysfluency [x]  Cognitive Ling. Deficit   [x]  Aphasia []  Vocal Cord Dysfunction []  Dysphonia     Progression towards goals can be found in tasks completed in therapy (bold face type):   Pt was provided a memory task. She was able to describe the steps we would utilize to remember the information, but continues to struggle to generate the strategy of visualization. She was able to report she needs to read through the information, then underline the important pieces, then try to remember those pieces, but when I ask her how we will remember the pieces, what we will do, she is unable to tell me. Clinician re-educated she needs to visualize the information. Pt immediately recalled 5/5 pieces of information I'ly. After a 25 min delay, the pt recalled 2/5 I'ly, and 3/5 with SBA. We transitioned to her word-finding homework. 100% on the task provided.  She reported she was confused the first time she looked at the task and was not sure what to do, and then went back later and filled the information in fairly easily. Pt and pt's  reported she completed the task independently. The pt was provided another word-finding task. She generated 2/5 I'ly, 1/5 with Haily of semantic information, and 2/5 with maxA. Pt was asked to practice describing objects and to generate the function first. Pt generated the function first 2/3 trials, and then was able to describe well for 2/3 trials. 1 trial clinician was unable to guess what pt was describing. HEP: Provided- Pt was asked to utilize internal memory strategies and practice attempting to recall information at home such as errors, grocery items, etc. Pt's  reported she tends to recall information if he repeats it more than once, however if he does not, like today before they left, then she still struggles with the short term memory of even only 3 min. Patient/Caregiver instruction/education: Pt was re-educated on main internal memory strategy being worked on, and describing strategies.     Pain Level (0-10 scale) post treatment: 0    ASSESSMENT  []   Improving appropriately and progressing toward goals  [x]   Improving slowly and progressing toward goals  []   Approximating goals/maximum potential  [x]   Continues to benefit from skilled therapy to address remaining functional deficits  []   Not progressing toward goals and plan of care will be adjusted    PLAN   [x]  Continue Plan of Care    []  See progress note/recertification  []  Upgrade activities as tolerated      []  Discharge due to:  []  Other:    Osbaldo Mack, SLP   3/26/2021,

## 2021-03-31 ENCOUNTER — HOSPITAL ENCOUNTER (OUTPATIENT)
Dept: PHYSICAL THERAPY | Age: 51
Discharge: HOME | End: 2021-03-31
Payer: COMMERCIAL

## 2021-03-31 PROCEDURE — 97129 THER IVNTJ 1ST 15 MIN: CPT

## 2021-03-31 PROCEDURE — 97130 THER IVNTJ EA ADDL 15 MIN: CPT

## 2021-03-31 PROCEDURE — 92507 TX SP LANG VOICE COMM INDIV: CPT

## 2021-03-31 NOTE — PROGRESS NOTES
Speech Language Pathology: Daily Note      Patient Name: Chitra Mcgee   3/31/2021   : 1970  [x]  Patient  Verified  Payor: Roberta Acevedo / Plan: Petra Lundberg [id number] PPO / Product Type: PPO /   In time: 3:30pm  Out time:4:15pm  Total Treatment Time (min): 45 min  1:1 Treatment Time ( Only): 45 min  Visit #:  30    SUBJECTIVE  Pain Level (0-10 scale): 0    Subjective functional status/changes: \"We stayed dry. It's raining now\". OBJECTIVE  Treatment provided includes the following. Increase/Improve:  []  Voice Quality [x]  Expressive Language []  Oral Motor Skills   []  Vocal Loudness []  Auditory Comprehension []  Eating/Swallowing Skills   []  Vocal Cord Function []  Writing Skills []  Laryngeal/Pharyngeal Function   []  Resonance []  Reading Comprehension []   []  Breath Support/Coord. [x]  Cognitive-Linguistic Skills []   []  Speech Intelligibility []  Safety Awareness []   []  Articulation []  Attention []   []  Fluency [x]  Memory []     Decrease:  []  Dysphagia []  Apraxia []  Dysphonia   []  Dysarthria []  Dysfluency [x]  Cognitive Ling. Deficit   [x]  Aphasia []  Vocal Cord Dysfunction []  Dysphonia     Progression towards goals can be found in tasks completed in therapy (bold face type): We began the session with a memory task. Clinician wishes to solidify strategies pt needs to know and use in her life outside of therapy. She knows she needs to identify important information to recall it, but still struggles to report she needs to visualize the information despite doing it each session. Clinician made it clear today that we need to determine what is important, but we can't just repeat something over and over again, we need to do something with the information and then repeat that strategy. So our strategy to remember information is to visualize it.  For each sentence the clinician asked what is important and pt was able to identify independently, and then clinician requested to know what we were going to vizaulize for each piece. We then reviewed what we pictured. 5/6 I'ly and 1/6 with Haily. After approximately a 20 min delay the pt recalled 5/6 independently, and 1/6 with modA. We transitioned to providing synonyms. 6/10 I'ly, 2 with Haily of semantic cues, 1 with modA of phonemic cues, and 1 with maxA. Pt was then to provide opposite. 3/11 I'ly, 2/11 with Haily, 5/11 with modA, 1/11 with maxA. Pt generated story and became stuck on a word. After a delay in time pt independently attempted to utilize circumlocution but needed clinician cue to provide function. HEP: Provided    Patient/Caregiver instruction/education: Need to know strategies for what we're working on so she can utilize them in the community, HEP, internal memory strategy, circumlocution and need to provide function.       Pain Level (0-10 scale) post treatment: 0    ASSESSMENT  []   Improving appropriately and progressing toward goals  [x]   Improving slowly and progressing toward goals  []   Approximating goals/maximum potential  [x]   Continues to benefit from skilled therapy to address remaining functional deficits  []   Not progressing toward goals and plan of care will be adjusted    PLAN   [x]  Continue Plan of Care    []  See progress note/recertification  []  Upgrade activities as tolerated      []  Discharge due to:  []  Other:    Apurva Saunders SLP   3/31/2021,

## 2021-04-02 ENCOUNTER — CLINICAL SUPPORT (OUTPATIENT)
Dept: FAMILY MEDICINE CLINIC | Age: 51
End: 2021-04-02

## 2021-04-02 ENCOUNTER — HOSPITAL ENCOUNTER (OUTPATIENT)
Dept: PHYSICAL THERAPY | Age: 51
Discharge: HOME | End: 2021-04-02
Payer: COMMERCIAL

## 2021-04-02 VITALS — TEMPERATURE: 97.5 F

## 2021-04-02 DIAGNOSIS — D32.0 BENIGN MENINGIOMA OF BRAIN (HCC): Primary | ICD-10-CM

## 2021-04-02 PROCEDURE — 92507 TX SP LANG VOICE COMM INDIV: CPT

## 2021-04-02 NOTE — PROGRESS NOTES
Learning Assessment 10/19/2020   PRIMARY LEARNER Patient   HIGHEST LEVEL OF EDUCATION - PRIMARY LEARNER  GRADUATED HIGH SCHOOL OR GED   BARRIERS PRIMARY LEARNER NONE   CO-LEARNER CAREGIVER No   PRIMARY LANGUAGE ENGLISH   LEARNER PREFERENCE PRIMARY READING   ANSWERED BY PAtient   RELATIONSHIP SELF       After obtaining consent, and per orders of [facility], injection of Sandostatin 30 mg given by Elisa Salgado LPN. Patient instructed to remain in clinic for 20 minutes afterwards, and to report any adverse reaction to me immediately. [facility] [id number] exp 07.31.23  RTC 1 month.

## 2021-04-02 NOTE — PROGRESS NOTES
Speech Language Pathology: Daily Note      Patient Name: Meenu Hernandez   2021   : 1970  [x]  Patient  Verified  Payor: Elvis Billings / Plan: Petra Lundberg [id number] PPO / Product Type: PPO /   In time: 3:32  Out time: 4:20  Total Treatment Time (min): 48 min  1:1 Treatment Time ( Only): 48 min  Visit #: 22 of 30    SUBJECTIVE  Pain Level (0-10 scale): 0    Subjective functional status/changes:   \"Our golf cart got delivered today\"! OBJECTIVE  Treatment provided includes the following. Increase/Improve:  []  Voice Quality [x]  Expressive Language []  Oral Motor Skills   []  Vocal Loudness []  Auditory Comprehension []  Eating/Swallowing Skills   []  Vocal Cord Function []  Writing Skills []  Laryngeal/Pharyngeal Function   []  Resonance []  Reading Comprehension []   []  Breath Support/Coord. [x]  Cognitive-Linguistic Skills []   []  Speech Intelligibility []  Safety Awareness []   []  Articulation []  Attention []   []  Fluency [x]  Memory []     Decrease:  []  Dysphagia []  Apraxia []  Dysphonia   []  Dysarthria []  Dysfluency [x]  Cognitive Ling. Deficit   [x]  Aphasia []  Vocal Cord Dysfunction []  Dysphonia   Progression towards goals can be found in tasks completed in therapy (bold face type): Today pt was able to report strategy to remember information (both 1 external and the 1 internal we have been working on). Pt utilized strategy with SBA for most attempts aside from name. Pt immediately recalled 9/10 pieces of information. After a 20 min delay pt recalled 6/10 I'ly, 2/10 with Haily of semantic cues, and 1/10 with modA with phonemic cue, and 1/10 with maxA. Pt provided homework. 100% accuracy I'ly for language task where she was provided a statement, jumbled letters, and determined the desired word. Pt was able to report strategy when she becomes stuck generating a word! Pt was able to use it I'ly today during conversation!  Pt was provided a picture of objects and asked to provide function first to narrow down options, and then continue describing. Pt required verbal cue to provide function for 1/2 trials, and independent for 2nd trial.  Good session today with increased independence! HEP: Provided- language and memory    Patient/Caregiver instruction/education: Educated on HEP. Pt able to provide strategies for memory and word finding today I'ly!     Pain Level (0-10 scale) post treatment: 0    ASSESSMENT  []   Improving appropriately and progressing toward goals  [x]   Improving slowly and progressing toward goals  []   Approximating goals/maximum potential  [x]   Continues to benefit from skilled therapy to address remaining functional deficits  []   Not progressing toward goals and plan of care will be adjusted    PLAN   [x]  Continue Plan of Care    []  See progress note/recertification  []  Upgrade activities as tolerated      []  Discharge due to:  []  Other:    AVIS Marcano   4/2/2021, Statement Selected

## 2021-04-07 ENCOUNTER — HOSPITAL ENCOUNTER (OUTPATIENT)
Dept: PHYSICAL THERAPY | Age: 51
Discharge: HOME | End: 2021-04-07
Payer: COMMERCIAL

## 2021-04-07 PROCEDURE — 92507 TX SP LANG VOICE COMM INDIV: CPT

## 2021-04-07 NOTE — PROGRESS NOTES
Speech Language Pathology: Daily Note      Patient Name: Caesar Blanco   2021   : 1970  [x]  Patient  Verified  Payor: Lanre Austin / Plan: Petra Lundberg [id number] PPO / Product Type: PPO /   In time: 10:06am  Out time:10:50am  Total Treatment Time (min): 44 min  1:1 Treatment Time ( Only): 44 min  Visit #: 23 of 30    SUBJECTIVE  Pain Level (0-10 scale): 0    Subjective functional status/changes:   \"We're doing good. I'm going to work with my  for a bit. \"     OBJECTIVE  Treatment provided includes the following. Increase/Improve:  []  Voice Quality [x]  Expressive Language []  Oral Motor Skills   []  Vocal Loudness []  Auditory Comprehension []  Eating/Swallowing Skills   []  Vocal Cord Function []  Writing Skills []  Laryngeal/Pharyngeal Function   []  Resonance []  Reading Comprehension []   []  Breath Support/Coord. [x]  Cognitive-Linguistic Skills []   []  Speech Intelligibility []  Safety Awareness []   []  Articulation []  Attention []   []  Fluency [x]  Memory []     Decrease:  []  Dysphagia []  Apraxia []  Dysphonia   []  Dysarthria []  Dysfluency [x]  Cognitive Ling. Deficit   [x]  Aphasia []  Vocal Cord Dysfunction []  Dysphonia     Progression towards goals can be found in tasks completed in therapy (bold face type):   Pt did well with the homework previously provided. She was provided a description and jumbled letters and asked to determine the word.  I'ly, and  with Haily of request for words similar to the one provided, and provided the first letter. Pt was also provided a word and asked to match it to a list of definitions.  I'ly. Pt was provided a memory task. She completed the first part accurately with describing the item, but did not complete the second half of setting an alarm and then seeing if she recalled the items. The pt was provided a memory task and asked to report how to recall information.  Though not super succinct, the pt was able to provide the strategy I'ly. The pt then required SBA to Haily to utilize the strategies. She immediately recalled 3/4 pieces of information, and required maxA for 1/4. After a 20 min delay the pt recalled 3/4 I'ly and required Haily of semantic cues to recall the same one she previously had difficulty recalling. Pt was provided problems to solve. She solved 7/9 I'ly, and 2/9 with SBA. Pt was asked to provide options in each situation to increase her problem solving and ability to think outside the box when one situation does not work. HEP: The pt was provided a task to work on tomorrow when not in therapy. Patient/Caregiver instruction/education: Pt was educated on HEP, and when to utilize circumlocution, as well as assistance with internal memory strategy for problem provided.     Pain Level (0-10 scale) post treatment: 0    ASSESSMENT  []   Improving appropriately and progressing toward goals  [x]   Improving slowly and progressing toward goals  []   Approximating goals/maximum potential  [x]   Continues to benefit from skilled therapy to address remaining functional deficits  []   Not progressing toward goals and plan of care will be adjusted    PLAN   [x]  Continue Plan of Care    []  See progress note/recertification  []  Upgrade activities as tolerated      []  Discharge due to:  []  Other:    Margarita Marin, SLP   4/7/2021,

## 2021-04-09 ENCOUNTER — HOSPITAL ENCOUNTER (OUTPATIENT)
Dept: PHYSICAL THERAPY | Age: 51
Discharge: HOME | End: 2021-04-09
Payer: COMMERCIAL

## 2021-04-09 PROCEDURE — 92507 TX SP LANG VOICE COMM INDIV: CPT

## 2021-04-09 NOTE — PROGRESS NOTES
Speech Language Pathology: Daily Note      Patient Name: Mono Trevino   2021   : 1970  [x]  Patient  Verified  Payor: Jacquesjefferson Norrisling / Plan: Petra Lundberg [id number] PPO / Product Type: PPO /   In time:3:32pm  Out time: 4:25pm  Total Treatment Time (min): 53 min  1:1 Treatment Time ( Only): 53 min  Visit #: 24 of 30    SUBJECTIVE  Pain Level (0-10 scale): 0    Subjective functional status/changes:   \"the weather is really nice! \"     OBJECTIVE  Treatment provided includes the following. Increase/Improve:  []  Voice Quality [x]  Expressive Language []  Oral Motor Skills   []  Vocal Loudness []  Auditory Comprehension []  Eating/Swallowing Skills   []  Vocal Cord Function []  Writing Skills []  Laryngeal/Pharyngeal Function   []  Resonance []  Reading Comprehension []   []  Breath Support/Coord. [x]  Cognitive-Linguistic Skills []   []  Speech Intelligibility []  Safety Awareness []   []  Articulation []  Attention []   []  Fluency [x]  Memory []     Decrease:  []  Dysphagia []  Apraxia []  Dysphonia   []  Dysarthria []  Dysfluency [x]  Cognitive Ling. Deficit   [x]  Aphasia []  Vocal Cord Dysfunction []  Dysphonia   Progression towards goals can be found in tasks completed in therapy (bold face type):   Pt returned spatial organization task of map with questions. Pt completed 4/5 with the correct answer. She required modA for 1/5. Pt was more or less able to explain how to remember information. She was provided a task and required Haily-modA to utilize the strategy, but immediately recalled 5/5 pieces of information. After a 25 min delay the pt recalled 5/5 I'ly. Pt was provided a basic reasoning task. She was able to utilize clues to determine which items were no longer a choice independently for 1/4. She required SBA and a bit of additional processing time for 3/4. Pt was provided a short story with words missing.  She had to utilize words from the word box to determine which word appropriately fit in the space based on the remainder of the sentence and meaning of the words in the word box. Pt required a bit of processing time, but was able to accurately identify 11/11 I'ly. HEP: Provided- language and memory    Patient/Caregiver instruction/education: Pt was re-educated on when to utilize circumlocution, and HEP.     Pain Level (0-10 scale) post treatment: 0    ASSESSMENT  []   Improving appropriately and progressing toward goals  [x]   Improving slowly and progressing toward goals  []   Approximating goals/maximum potential  [x]   Continues to benefit from skilled therapy to address remaining functional deficits  []   Not progressing toward goals and plan of care will be adjusted    PLAN   [x]  Continue Plan of Care    []  See progress note/recertification  []  Upgrade activities as tolerated      []  Discharge due to:  []  Other:    Margarita Marin SLP   4/9/2021,

## 2021-04-14 ENCOUNTER — HOSPITAL ENCOUNTER (OUTPATIENT)
Dept: PHYSICAL THERAPY | Age: 51
Discharge: HOME | End: 2021-04-14
Payer: COMMERCIAL

## 2021-04-14 PROCEDURE — 92507 TX SP LANG VOICE COMM INDIV: CPT

## 2021-04-14 NOTE — PROGRESS NOTES
Speech Language Pathology: Daily Note      Patient Name: Kel Chowdhury   2021   : 1970  [x]  Patient  Verified  Payor: Levar Marline / Plan: Petra Lundberg [id number] PPO / Product Type: PPO /   In time: 3:30  Out time: 4:15pm  Total Treatment Time (min): 45 min  1:1 Treatment Time ( Only): 45 min  Visit #: 25 of 33    SUBJECTIVE  Pain Level (0-10 scale): 0    Subjective functional status/changes:   \"I've just been hanging out waiting for [facility] to pick me up and bring me here\". OBJECTIVE  Treatment provided includes the following. Increase/Improve:  []  Voice Quality [x]  Expressive Language []  Oral Motor Skills   []  Vocal Loudness []  Auditory Comprehension []  Eating/Swallowing Skills   []  Vocal Cord Function []  Writing Skills []  Laryngeal/Pharyngeal Function   []  Resonance []  Reading Comprehension []   []  Breath Support/Coord. [x]  Cognitive-Linguistic Skills []   []  Speech Intelligibility []  Safety Awareness []   []  Articulation [x]  Attention []   []  Fluency [x]  Memory []     Decrease:  []  Dysphagia []  Apraxia []  Dysphonia   []  Dysarthria []  Dysfluency [x]  Cognitive Ling. Deficit   [x]  Aphasia []  Vocal Cord Dysfunction []  Dysphonia        Progression towards goals can be found in tasks completed in therapy (bold face type):   First task was reviewing pt's homework. Pt half completed the memory task correctly, and clinician went over the pieces she missed. 13/15 I'ly for matching word to definition.  I'ly determining word from word bank and goes in each blank in the story provided. The pt displayed fantastic during conversation and discussion of homework today. Pt also described well when asked during conversation. Pt almost never became stuck! Pt was provided a complex language task to provide opposite of word provided 6/15 I'ly, required Haily of semantic cues for 2/15, modA of increased cues or phonemic cue for 2/15, and maxA for 5/15.     HEP: Provided- word-generation/finding    Patient/Caregiver instruction/education: HEP, accuracy with homework returned, how to utilize memory strategies to recall information you hear    Pain Level (0-10 scale) post treatment: 0    ASSESSMENT  []   Improving appropriately and progressing toward goals  [x]   Improving slowly and progressing toward goals  []   Approximating goals/maximum potential  [x]   Continues to benefit from skilled therapy to address remaining functional deficits  []   Not progressing toward goals and plan of care will be adjusted    PLAN   [x]  Continue Plan of Care    []  See progress note/recertification  []  Upgrade activities as tolerated      []  Discharge due to:  []  Other:    Gilmer Mccormick, SLP   4/14/2021,

## 2021-04-16 ENCOUNTER — VIRTUAL VISIT (OUTPATIENT)
Dept: FAMILY MEDICINE CLINIC | Age: 51
End: 2021-04-16
Payer: COMMERCIAL

## 2021-04-16 DIAGNOSIS — D32.0 BENIGN MENINGIOMA OF BRAIN (HCC): Primary | ICD-10-CM

## 2021-04-16 PROCEDURE — 99213 OFFICE O/P EST LOW 20 MIN: CPT | Performed by: NURSE PRACTITIONER

## 2021-04-16 NOTE — PROGRESS NOTES
Ezra Alejo is a 45 y.o. female who was seen by synchronous (real-time) audio-video technology on 4/16/2021. This encounter was conducted via Doxy. me. Consent:  She and/or her healthcare decision maker is aware that this patient-initiated Telehealth encounter is a billable service, with coverage as determined by her insurance carrier. She is aware that she may receive a bill and has provided verbal consent to proceed: Yes    I was in the office while conducting this encounter. [id number]  Subjective:   HPI: Ezra Alejo was seen for Medication Evaluation (Talk with you about Gabapentin)    Benign brain meningioma: Under the care of Dr. Thom Kawasaki, Dr. Malini Peña at [facility] and Dr. Shaina Salas for proton therapy. Underwent left retrosignoid craniotomy for resection of mass in June 2010. Recurrence of disease in middle cranial fossa and displacing left temporal lobe.  Had a left pterional craniotomy for resection of the mass in December 2019.  After this, patient had hemorrhage in the resection bed, and was taken to operating room for repeat carniotomy for evacuation of the hematoma. She remains on Lamictal for seizure activity, none recently. She completed proton therapy with Dr. Shaina Salas in [facility]. Last MRI shows a 17% reduction in the size of the meningioma but neuro-oncology feels like the deficits from the lesion including hearing loss, diplopia, aphasia will be chronic. She will see Dr. Carissa Welch, ophthalmology, Dr. Shaina Salas, and Dr. Malini Peña in June 2021. New issues: Bea Gasca has been on gabapentin 100 mg BID since October for neuropathic pain on the L side of her face. She is not experiencing any nerve pain at this time. She is unsure if she needs to remain on the medication. She is seeing speech therapy at [facility] twice a week with improvement.      Allergies   Allergen Reactions    Pcn [Penicillins] Hives       Current Outpatient Medications   Medication Sig    rosuvastatin (CRESTOR) 10 mg tablet TAKE 1 TABLET BY MOUTH EVERY EVENING    gabapentin (NEURONTIN) 100 mg capsule TAKE 1 CAPSULE BY MOUTH TWICE DAILY. MAX DAILY AMOUNT: 200 MG    PARoxetine (PAXIL) 10 mg tablet TK 1 T PO D    lamoTRIgine (LaMICtal) 200 mg tablet TK 1 T PO BID    SANDOSTATIN LAR DEPOT 30 mg serr injection by Injection route every thirty (30) days. No current facility-administered medications for this visit. Past Medical History:   Diagnosis Date    Brain tumor (benign) ([facility] 2010    meningioma    Contact dermatitis and other eczema, due to unspecified cause 2015    morphoea    Headache     brain tumor/ medication    Leukemia ([facility] 5    age 4-4 years old    Menopause     [facility]        Family History   Problem Relation Age of Onset    Asthma Mother     Heart Disease Paternal Uncle     Heart Disease Paternal Grandfather        ROS:  Denies fever, chills, cough, chest pain, SOB,  nausea, vomiting, diarrhea, dysuria. Denies rashes, wounds, arthralgias, weakness, numbness, visual changes, depression. Denies wt loss, wt gain, hemoptysis, hematochezia or melena. Patient is not experiencing chest pain radiating to the jaw and/or down the arms.     PHYSICAL EXAMINATION:    Vital Signs: (As obtained by patient/caregiver at home)  Visit Vitals  LMP 07/04/2015        Constitutional: [x] Appears well-developed and well-nourished [x] No apparent distress      [] Abnormal -     Mental status: [x] Alert and awake  [x] Oriented to person/place/time [x] Able to follow commands    [] Abnormal -     Eyes:   EOM    [x]  Normal    [] Abnormal -   Sclera  [x]  Normal    [] Abnormal -          Discharge [x]  None visible   [] Abnormal -     HENT: [x] Normocephalic, atraumatic  [] Abnormal -   [x] Mouth/Throat: Mucous membranes are moist    External Ears [x] Normal  [] Abnormal -    Neck: [x] No visualized mass [] Abnormal -     Pulmonary/Chest: [x] Respiratory effort normal   [x] No visualized signs of difficulty breathing or respiratory distress        [] Abnormal -      Musculoskeletal:   [x] Normal gait with no signs of ataxia         [x] Normal range of motion of neck        [] Abnormal -     Neurological:        [x] No Facial Asymmetry (Cranial nerve 7 motor function) (limited exam due to video visit)          [x] No gaze palsy        [] Abnormal -          Skin:        [x] No significant exanthematous lesions or discoloration noted on facial skin         [] Abnormal -            Psychiatric:       [x] Normal Affect [] Abnormal -        [x] No Hallucinations    Other pertinent observable physical exam findings:-        Assessment & Plan:       ICD-10-CM ICD-9-CM    1. Benign meningioma of brain ([facility] [address] [facility]  D32.0 225.2        Ok to titrate off of gabapentin. Go down to once a day 100 mg and then stop after 7 days. If neuropathy symptoms return, notify me to restart. Reviewed available records from [facility]. Patient aware of plan of care and verbalized understanding. Questions answered. RTC PRN. We discussed the expected course, resolution and complications of the diagnosis(es) in detail. Medication risks, benefits, costs, interactions, and alternatives were discussed as indicated. I advised her to contact the office if her condition worsens, changes or fails to improve as anticipated. She expressed understanding with the diagnosis(es) and plan. Pursuant to the emergency declaration under the [facility] waiver authority and the [employer] and [employer]ar General Act, this Virtual  Visit was conducted, with patient's consent, to reduce the patient's risk of exposure to COVID-19 and provide continuity of care for an established patient. Services were provided through a video synchronous discussion virtually to substitute for in-person clinic visit.     Cam Drew NP

## 2021-04-16 NOTE — PROGRESS NOTES
Chief Complaint   Patient presents with    Medication Evaluation     Talk with you about Gabapentin     1. Have you been to the ER, urgent care clinic since your last visit? Hospitalized since your last visit? No    2. Have you seen or consulted any other health care providers outside of the [address] [place] [address] since your last visit? Include any pap smears or colon screening. No    Fall Risk Assessment, last 12 mths 4/16/2021   Able to walk? Yes   Fall in past 12 months? 0   Do you feel unsteady? 0   Are you worried about falling 0     Abuse Screening Questionnaire 4/16/2021   Do you ever feel afraid of your partner? N   Are you in a relationship with someone who physically or mentally threatens you? N   Is it safe for you to go home?  Y     Learning Assessment 4/16/2021   PRIMARY LEARNER Patient   HIGHEST LEVEL OF EDUCATION - PRIMARY LEARNER  GRADUATED HIGH SCHOOL OR GED   BARRIERS PRIMARY LEARNER NONE   CO-LEARNER CAREGIVER No   PRIMARY LANGUAGE ENGLISH   LEARNER PREFERENCE PRIMARY READING   ANSWERED BY Patient   RELATIONSHIP SELF     3 most recent PHQ Screens 4/16/2021   PHQ Not Done -   Little interest or pleasure in doing things Not at all   Feeling down, depressed, irritable, or hopeless Not at all   Total Score PHQ 2 0

## 2021-04-21 ENCOUNTER — HOSPITAL ENCOUNTER (OUTPATIENT)
Dept: PHYSICAL THERAPY | Age: 51
Discharge: HOME | End: 2021-04-21
Payer: COMMERCIAL

## 2021-04-21 PROCEDURE — 92507 TX SP LANG VOICE COMM INDIV: CPT

## 2021-04-21 NOTE — PROGRESS NOTES
Speech Language Pathology: Daily Note      Patient Name: Arcenio Chaves   2021   : 1970  [x]  Patient  Verified  Payor: Boston Cunningham / Plan: Petra Lundberg [id number] PPO / Product Type: PPO /   In time: 3:30pm  Out time: 4: 20pm  Total Treatment Time (min): 50 min  1:1 Treatment Time ( Only): 50 min  Visit #:  30    SUBJECTIVE  Pain Level (0-10 scale): 0    Subjective functional status/changes:   \"the weather is great! \"     OBJECTIVE  Treatment provided includes the following. Increase/Improve:  []  Voice Quality [x]  Expressive Language []  Oral Motor Skills   []  Vocal Loudness []  Auditory Comprehension []  Eating/Swallowing Skills   []  Vocal Cord Function []  Writing Skills []  Laryngeal/Pharyngeal Function   []  Resonance []  Reading Comprehension []   []  Breath Support/Coord. [x]  Cognitive-Linguistic Skills []   []  Speech Intelligibility []  Safety Awareness []   []  Articulation []  Attention []   []  Fluency [x]  Memory []     Decrease:  []  Dysphagia []  Apraxia []  Dysphonia   []  Dysarthria []  Dysfluency [x]  Cognitive Ling. Deficit   [x]  Aphasia []  Vocal Cord Dysfunction []  Dysphonia       Progression towards goals can be found in tasks completed in therapy (bold face type): Pt presented completed homework. She did fairly well determining what in the sentence did not make sense.  I'ly,  w/ Haily to increase specificity of word chosen or location,  with maxA as pt knew what she wanted more or less and was able to identify the correct piece of the sentence to change, but not sure what the word was to replace it. Pt was also provided a part task where she had to identify a whole. Pt was not quite on the right track and more described the part. She independently generated 10/28. She required SBA to get on task with part to whole idea for , Haily for , Zettie Malina for , and maxA for .    Pt was partially able to describe what to do to recall information she hears. Clinician clarified for pt. Pt then immediately recalled 5/6 pieces of information. Clinician provided assistance with the one missed. After a 15 min delay pt recalled 5/6 pieces of information.     HEP: organization/word-finding task    Patient/Caregiver instruction/education: Skill level compared to first session, accuracy with HW, HEP    Pain Level (0-10 scale) post treatment: 0    ASSESSMENT  []   Improving appropriately and progressing toward goals  [x]   Improving slowly and progressing toward goals  []   Approximating goals/maximum potential  [x]   Continues to benefit from skilled therapy to address remaining functional deficits  []   Not progressing toward goals and plan of care will be adjusted    PLAN   [x]  Continue Plan of Care    []  See progress note/recertification  []  Upgrade activities as tolerated      []  Discharge due to:  []  Other:    Pricila Luis, SLP   4/21/2021,

## 2021-04-23 ENCOUNTER — HOSPITAL ENCOUNTER (OUTPATIENT)
Dept: PHYSICAL THERAPY | Age: 51
Discharge: HOME | End: 2021-04-23
Payer: COMMERCIAL

## 2021-04-23 ENCOUNTER — TELEPHONE (OUTPATIENT)
Dept: FAMILY MEDICINE CLINIC | Age: 51
End: 2021-04-23

## 2021-04-23 PROCEDURE — 92507 TX SP LANG VOICE COMM INDIV: CPT

## 2021-04-23 NOTE — TELEPHONE ENCOUNTER
Patient  wanted to let us know that she has completed the wean off dosage of Gabapentin and she is starting to have some ringing in the L ear.

## 2021-04-23 NOTE — PROGRESS NOTES
Speech Language Pathology: Daily Note      Patient Name: Latoya Ramos   2021   : 1970  [x]  Patient  Verified  Payor: Ben Rivas / Plan: Petra Lundberg [id number] PPO / Product Type: PPO /   In time: 3:35  Out time: 4:20pm  Total Treatment Time (min): 45 min  1:1 Treatment Time ( Only): 45 min  Visit #: 28 of 30    SUBJECTIVE  Pain Level (0-10 scale): 0    Subjective functional status/changes: \"The weather is beautiful! I went for a walk today\". OBJECTIVE  Treatment provided includes the following. Increase/Improve:  []  Voice Quality [x]  Expressive Language []  Oral Motor Skills   []  Vocal Loudness []  Auditory Comprehension []  Eating/Swallowing Skills   []  Vocal Cord Function []  Writing Skills []  Laryngeal/Pharyngeal Function   []  Resonance []  Reading Comprehension []   []  Breath Support/Coord. [x]  Cognitive-Linguistic Skills []   []  Speech Intelligibility []  Safety Awareness []   []  Articulation []  Attention []   []  Fluency [x]  Memory []     Decrease:  []  Dysphagia []  Apraxia []  Dysphonia   []  Dysarthria []  Dysfluency [x]  Cognitive Ling. Deficit   [x]  Aphasia []  Vocal Cord Dysfunction []  Dysphonia    Progression towards goals can be found in tasks completed in therapy (bold face type):   Pt returned homework today. She attempted the task, but has become so used to describing, that she described the task instead of providing objects required for it. Pt required modA to wrap her mind around the task and generate the required information. We then transitioned to memory task. Though not in accurate wording, pt was able to more or less describe the internal memory strategy. She was provided a task and immediately recalled 5/6 pieces of information independently. She then required SBA to Haily to go back and report what she was visualizing. After approximately an 18 min delay the pt recalled 5/6 I'ly and 1/6 with modA of semantic cues.   Clinician transitioned to a reasoning task. It was a time schedule and when things were to be done. It was a fairly basic task, however the pt found it a bit confusing. Pt required on average modA to complete the task.     HEP: Provided    Patient/Caregiver instruction/education: HEP, errors on HW, clarified internal memory strategy    Pain Level (0-10 scale) post treatment: 0    ASSESSMENT  []   Improving appropriately and progressing toward goals  [x]   Improving slowly and progressing toward goals  []   Approximating goals/maximum potential  [x]   Continues to benefit from skilled therapy to address remaining functional deficits  []   Not progressing toward goals and plan of care will be adjusted    PLAN   [x]  Continue Plan of Care    []  See progress note/recertification  []  Upgrade activities as tolerated      []  Discharge due to:  []  Other:    Melissa Jacques, SLP   4/23/2021,

## 2021-04-23 NOTE — TELEPHONE ENCOUNTER
Woodrow Torres please call to discuss what rosalind told pt about weaning off gabapentin and break thru pain

## 2021-04-26 ENCOUNTER — TELEPHONE (OUTPATIENT)
Dept: FAMILY MEDICINE CLINIC | Age: 51
End: 2021-04-26

## 2021-04-26 NOTE — TELEPHONE ENCOUNTER
Rgh out pt would like you to do a order thru the computer way instead of on paper if you will  to allow them to do port flush for pt who has apt on Wednesday

## 2021-04-27 DIAGNOSIS — D32.0 BENIGN MENINGIOMA OF BRAIN (HCC): ICD-10-CM

## 2021-04-27 DIAGNOSIS — D32.0 BENIGN MENINGIOMA OF BRAIN (HCC): Primary | ICD-10-CM

## 2021-04-27 RX ORDER — SODIUM CHLORIDE 9 MG/ML
10 INJECTION INTRAMUSCULAR; INTRAVENOUS; SUBCUTANEOUS AS NEEDED
Status: CANCELLED | OUTPATIENT
Start: 2021-04-27

## 2021-04-27 RX ORDER — HEPARIN 100 UNIT/ML
500 SYRINGE INTRAVENOUS AS NEEDED
Status: CANCELLED | OUTPATIENT
Start: 2021-04-27

## 2021-04-27 RX ORDER — SODIUM CHLORIDE 0.9 % (FLUSH) 0.9 %
5-10 SYRINGE (ML) INJECTION AS NEEDED
Status: CANCELLED | OUTPATIENT
Start: 2021-04-27

## 2021-04-28 ENCOUNTER — HOSPITAL ENCOUNTER (OUTPATIENT)
Dept: PHYSICAL THERAPY | Age: 51
Discharge: HOME | End: 2021-04-28
Payer: COMMERCIAL

## 2021-04-28 ENCOUNTER — HOSPITAL ENCOUNTER (OUTPATIENT)
Dept: INFUSION THERAPY | Age: 51
Discharge: HOME | End: 2021-04-28
Payer: COMMERCIAL

## 2021-04-28 VITALS
RESPIRATION RATE: 17 BRPM | DIASTOLIC BLOOD PRESSURE: 74 MMHG | WEIGHT: 147 LBS | SYSTOLIC BLOOD PRESSURE: 116 MMHG | TEMPERATURE: 96.8 F | OXYGEN SATURATION: 98 % | BODY MASS INDEX: 24.46 KG/M2 | HEART RATE: 71 BPM

## 2021-04-28 DIAGNOSIS — D32.0 BENIGN MENINGIOMA OF BRAIN (HCC): Primary | ICD-10-CM

## 2021-04-28 PROCEDURE — 96523 IRRIG DRUG DELIVERY DEVICE: CPT

## 2021-04-28 PROCEDURE — 92507 TX SP LANG VOICE COMM INDIV: CPT

## 2021-04-28 PROCEDURE — 77030012965 HC NDL HUBR BBMI -A

## 2021-04-28 PROCEDURE — 74011250636 HC RX REV CODE- 250/636: Performed by: INTERNAL MEDICINE

## 2021-04-28 RX ORDER — HEPARIN 100 UNIT/ML
500 SYRINGE INTRAVENOUS AS NEEDED
Status: CANCELLED | OUTPATIENT
Start: 2021-05-26

## 2021-04-28 RX ORDER — HEPARIN 100 UNIT/ML
500 SYRINGE INTRAVENOUS AS NEEDED
Status: DISCONTINUED | OUTPATIENT
Start: 2021-04-28 | End: 2021-04-29 | Stop reason: HOSPADM

## 2021-04-28 RX ORDER — SODIUM CHLORIDE 9 MG/ML
10 INJECTION INTRAMUSCULAR; INTRAVENOUS; SUBCUTANEOUS AS NEEDED
Status: CANCELLED | OUTPATIENT
Start: 2021-05-26

## 2021-04-28 RX ORDER — SODIUM CHLORIDE 0.9 % (FLUSH) 0.9 %
5-10 SYRINGE (ML) INJECTION AS NEEDED
Status: CANCELLED | OUTPATIENT
Start: 2021-05-26

## 2021-04-28 RX ORDER — SODIUM CHLORIDE 9 MG/ML
10 INJECTION INTRAMUSCULAR; INTRAVENOUS; SUBCUTANEOUS AS NEEDED
Status: DISCONTINUED | OUTPATIENT
Start: 2021-04-28 | End: 2021-04-29 | Stop reason: HOSPADM

## 2021-04-28 RX ORDER — SODIUM CHLORIDE 0.9 % (FLUSH) 0.9 %
5-10 SYRINGE (ML) INJECTION AS NEEDED
Status: DISCONTINUED | OUTPATIENT
Start: 2021-04-28 | End: 2021-04-29 | Stop reason: HOSPADM

## 2021-04-28 RX ADMIN — Medication 10 ML: at 15:04

## 2021-04-28 RX ADMIN — Medication 500 UNITS: at 15:06

## 2021-04-28 NOTE — PROGRESS NOTES
Speech Language Pathology: Daily Note      Patient Name: Leonor Handy   2021   : 1970  [x]  Patient  Verified  Payor: Can Gandhi / Plan: Petra Lundberg [id number] PPO / Product Type: PPO /   In time: 3:30pm  Out time: 4:25pm  Total Treatment Time (min): 55 min  1:1 Treatment Time ( Only): 55 min  Visit #: 29 of 30    SUBJECTIVE  Pain Level (0-10 scale): 0    Subjective functional status/changes: \"The weather is great! I just kind of hung out today\". OBJECTIVE  Treatment provided includes the following. Increase/Improve:  []  Voice Quality [x]  Expressive Language []  Oral Motor Skills   []  Vocal Loudness []  Auditory Comprehension []  Eating/Swallowing Skills   []  Vocal Cord Function []  Writing Skills []  Laryngeal/Pharyngeal Function   []  Resonance []  Reading Comprehension []   []  Breath Support/Coord. [x]  Cognitive-Linguistic Skills []   []  Speech Intelligibility []  Safety Awareness []   []  Articulation []  Attention []   []  Fluency [x]  Memory []     Decrease:  []  Dysphagia []  Apraxia []  Dysphonia   []  Dysarthria []  Dysfluency [x]  Cognitive Ling. Deficit   [x]  Aphasia []  Vocal Cord Dysfunction []  Dysphonia       Progression towards goals can be found in tasks completed in therapy (bold face type):   Pt returned her homework. Pt was to provide an item in each category. 18/20 I'ly, 1/20 SBA, and 1/20 modA. 2nd task 19/20 I'ly, 1/20 Haily of semantic cues. Pt was asked to provide 4 items required to complete a task. She generated 4/4 for 3/4 trials, and 3/4 for 1/4 trials, with Haily for the one missed. Pt was able to provide the strategy to recall information. She was read a short paragraph of information. She struggled to utilize the strategy for the auditory information. Clinician walked the pt through the steps with min-modA, and pt immediately recalled 4/5 pieces of information. She missed the name of the building.  After approximately 20 min delay, the pt recalled 3/5 pieces of information, again being unable to generate the name of the building, and this time not recalling the color of it. Pt beautifully used circumlocution today when she was unable to find the word she wanted. She immediately jumped into it independently, and clinician was then able to continue the conversation after providing the pt assistance by generating the word she was unable to find. Pt was provided a deductive reasoning task. She completed most with SBA, but did require Haily for a couple more difficult pieces. We finished with a word-finding task. This one was difficult for the pt. She was provided information and asked to generate the word that began with a \"P\".  She required maxA for most.    HEP: Provided    Patient/Caregiver instruction/education: HEP    Pain Level (0-10 scale) post treatment: 0    ASSESSMENT  []   Improving appropriately and progressing toward goals  [x]   Improving slowly and progressing toward goals  []   Approximating goals/maximum potential  []   Continues to benefit from skilled therapy to address remaining functional deficits  []   Not progressing toward goals and plan of care will be adjusted    PLAN   [x]  Continue Plan of Care    []  See progress note/recertification  []  Upgrade activities as tolerated      []  Discharge due to:  []  Other:    Eileen Flores, SLP   4/28/2021,

## 2021-04-28 NOTE — PROGRESS NOTES
[facility] Progress Note    Date: 2021    Name: Colby Douglas    MRN: [id number]         : 1970    Monthly Port flush     Ms. Eloise Peoples was assessed and education was provided. Noted left lense of pt glasses clouded by tape. Pt reports issues from brain surgery. Ms. Pham's vitals were reviewed and patient was observed for 5 minutes prior to procedure. Visit Vitals  /74 (BP 1 Location: Left upper arm, BP Patient Position: At rest;Sitting)   Pulse 71   Temp 96.8 °F (36 °C)   Resp 17   Wt 66.7 kg (147 lb)   SpO2 98%   Breastfeeding No   BMI 24.46 kg/m²       Mediport was accessed with 20 gauge 1 inch  after chloroprep. Blood return: Yes       Flushed with 10 ml  of NS followed by Heparin 500u    Florez needle removed. Band-Aid applied. Ms. Eloise Peoples tolerated the procedure, and had no complaints. Ms. Eloise Peoples was discharged from [place] [address] in stable condition at 1505 . She is to return on  @ 3pm  for her next appointment.     Joshua Lott RN  2021  4:27 PM

## 2021-04-28 NOTE — PROGRESS NOTES
Problem: Infection - Risk of, Central Venous Catheter-Associated Bloodstream Infection  Goal: *Absence of infection signs and symptoms  Outcome: Resolved/Met

## 2021-04-29 NOTE — PROGRESS NOTES
[facility]  [address] [facility], [address]:  [phone]  FAX: [phone]    Medicare Progress Report    Patient name: Luly Gonzalez Start of Care: 2021   Referral source: Mayte Shaikh* : 1970   Medical/Treatment Diagnosis: Aphasia due to old intracerebral hemorrhage [I69.120] Onset Date: 2021     Prior Hospitalization: see medical history Provider#: [id number]   Medications: Verified on Patient Summary List    Comorbidities: history of CVA, meningioma, and brain tumor with resection. Prior Level of Function: When pt first began therapy with this clinician in January, pt required Eben Alegriaer from her  to complete activities of daily living. Pt was struggling with memory, communication, problem solving, required assistance with ambulation, some transfers, and her  was driving. Pt was frustrated most with her language and memory skills. Pt has become a bit more independent both with cognitive function and use of external compensatory strategies. Pt still requires assistance from her  with the above difficulties, however does not require quite as much assistance with communication, recall, problem solving, or ambulation. Visits from Start of Care: 27    Missed Visits: 0    Reporting Period: 2021 to 2021    Subjective Reports: \"I'm a bit nervous about only having a few sessions left\". Current Status/ treatment goals Objective measures   1. When asked spatial orientation questions, the pt will independently respond with an appropriate answer for 85% of trials to increase appropriate communication with others in relation to events. [x] met                 [] not met  [] progressing Pt is independently displaying about 90% accuracy I'ly. Pt only occasionally requires cues to cognitively work through a question.    2. The pt will complete more complex word-finding tasks with 70% accuracy with SBA to increase her ability to participate in conversation. [] met                 [] not met  [x] progressing Pt is averaging about 68% accuracy. Pt has higher accuracy for some tasks, especially easier tasks, but this or lower accuracy with moderate or higher complex tasks. 3. The pt will complete reasoning tasks with 70% accuracy independently to increase her ability to solve problems. [] met                 [] not met  [x] progressing Pt has increased skill, but typically requires SBA, and occasional Haily for moderatly complex reasoning tasks. Pt has likely met this goal for easier tasks. 4. The pt will describe with SBA, including use of function during description, to increase her ability to continue conversation when she is unable to independently generate the desired word. X progressing Goal has mostly been met. Pt's skills have increased and she utilizes the strategy I'ly now when a little wait time is provided. She does not always I'ly provide function first, but many times only requires SBA. 5. The pt will complete verbal problem solving tasks with 90% accuracy with Haily to increase her ability to complete ADL with increased independence. X goal met Many times pt is independent with verbal problem solving of common issues that come up in daily ling. At times pt does require SBA, and only rarely requires Haily to think outside the box. 6. The pt will utilize internal memory strategies with Haily to independently recall 80% of information after a 20 min delay in time to increase her ability to complete ADL. [] met                 [] not met  [x] progressing Pt has met this goal with grocery items, to-do list, information read, but is now working on recall of auditory information, which is more difficult. Pt has not yet met this goal for auditory information.      Key functional changes: Ask pt to report strategies for tasks each session to increase knowledge and therefore use of the strategies in the community. Problems/ barriers to goal attainment: No known     Assessment / Recommendations:Pt continues to make slow gains in therapy. She continues to have some deficits, but has shown improvement. The pt will be discharged from speech pathology services in 3 more sessions due to maxing out what has been allowed by insurance. It is recommended the pt take a break from therapy, and work on utilizing the strategies she has learned over the next few months in an attempt to continue making gains with the assistance of her . Then at the start of the next year, she can return for additional services if she and her  desire. The clinician does recommend the pt be evaluated by occupational therapy for possible work on visuospatial skills in an attempt to increase vision and balance for increased safety with ADL and ambulation, if insurance counts occupational therapy as separate visits apart from speech pathology. Problem List:    [x]aphasic  []dysarthric  []dysphagic       []alexic  []agraphic  []dysphonia       []dysfluency   [x]Cognitive-Linguistic Disorder       []other        Treatment Plan: Aphasia Treatment and Cognitive/Language Treatment    Patient Goal (s) has been updated and includes:   1. Discontinuation of first goal due to having met it. 2. The pt will complete more complex word-finding tasks with 70% accuracy with SBA to increase her ability to participate in conversation.   3. The pt will complete reasoning tasks with 70% accuracy independently to increase her ability to solve problems. 4. The pt will describe with SBA, including use of function during description, to increase her ability to continue conversation when she is unable to independently generate the desired word. 5. Discontinuation due to goal having been met.   6. The pt will utilize internal memory strategies with Haily to independently recall 80% of information after a 20 min delay in time to increase her ability to complete ADL. Long Term Goals: To be accomplished in 15 weeks             1. The pt will utilize internal and external memory strategies to increase her independence with ADL. Goal partially met: the pt is increasing her independence, but continues to require practice utilizing the strategies.     2. The pt will increase her language skills to increase her ability to participate in conversation.    Goal partially met: the pt is increasing her skills with circumlocution, and is able to generate more of the words she wishes to stay, but continues to require cuing and time working on word-finding tasks. Updated Goals to be accomplished in 3 treatments:      Frequency / Duration: Patient to be seen for 3 sessions remaining in POC. AVIS Braun 4/29/2021     NOTE TO PHYSICIAN:  Please complete the following and fax to:   [facility] at [phone]    . Retain this original for your records. If you are unable to process this request in   24 hours, please contact our office.      ____ I have read the above report and request that my patient continue therapy with the following changes/special instructions:  ____ I have read the above report and request that my patient be discharged from therapy    Physician's Signature:_____________________ Date:___________Time:__________

## 2021-04-30 ENCOUNTER — APPOINTMENT (OUTPATIENT)
Dept: PHYSICAL THERAPY | Age: 51
End: 2021-04-30
Payer: COMMERCIAL

## 2021-05-03 ENCOUNTER — CLINICAL SUPPORT (OUTPATIENT)
Dept: FAMILY MEDICINE CLINIC | Age: 51
End: 2021-05-03

## 2021-05-03 DIAGNOSIS — D32.0 BENIGN MENINGIOMA OF BRAIN (HCC): Primary | ICD-10-CM

## 2021-05-03 NOTE — PROGRESS NOTES
Learning Assessment 4/16/2021   PRIMARY LEARNER Patient   HIGHEST LEVEL OF EDUCATION - PRIMARY LEARNER  GRADUATED HIGH SCHOOL OR GED   BARRIERS PRIMARY LEARNER NONE   CO-LEARNER CAREGIVER No   PRIMARY LANGUAGE ENGLISH   LEARNER PREFERENCE PRIMARY READING   ANSWERED BY Patient   RELATIONSHIP SELF         After obtaining consent, and per orders of [facility] Good, injection of Sandostatin 30 mg given by Alix Weiss LPN. Patient instructed to remain in clinic for 20 minutes afterwards, and to report any adverse reaction to me immediately. USR [id number] [facility] 07.31.23  RTC 1 month.

## 2021-05-05 ENCOUNTER — HOSPITAL ENCOUNTER (OUTPATIENT)
Dept: PHYSICAL THERAPY | Age: 51
Discharge: HOME | End: 2021-05-05
Payer: COMMERCIAL

## 2021-05-05 PROCEDURE — 92507 TX SP LANG VOICE COMM INDIV: CPT

## 2021-05-05 NOTE — PROGRESS NOTES
[address]  Ph: [phone] Fax: [phone]     Speech Pathology -- Discharge Summary    Name: Jeffrey Cooley        Date: 2021   : 1970   MD: Gabbi Ferrara*    Treatment Diagnosis: aphasia, cognitive communication deficit   Start of Care: 2021    Visits from Start of Care: 30  Missed Visits: 0 -family always made up any they had to cancel, which was rare. Good therapy participants. Pt's  always attended the sessions. Pt always returned with homework completed. Summary of Care: The pt was initially evaluated for aphasia and cognitive function due to history of meningioma, brain tumor with resection, history of CVA, and report from pt and  that she becomes frustrated due to difficulty generating the word she wishes to say, as well as difficulty with recall of information. Pt and  attended all therapy sessions, and pt was typically provided homework for all days not in therapy, aside from Sundays. Pt always returned with homework completed. Clinician assisted the pt with any errors, and educated her on external and internal memory strategies, as well as circumlocution when difficulty generating the word she wished to say. In addition, clinician worked with the pt on spatial orientation, problem solving, and reasoning to increase independence in community and with ADL. The pt met some goals which where then discharged or increased for greater skill. The pt however continues to display some aphasia and difficulty with recall of information.  Spatial orientation is pretty good, and problem solving/reasoning has increased, but could still use a little work if pt decides to return to therapy next fiorella year/. The pt will complete more complex word-finding tasks with 70% accuracy with SBA to increase her ability to participate in conversation.   Goal partially met: pt has met the goal for easy or moderate level tasks, but continues to display difficulty with more complex word-finding. 2. The pt will complete reasoning tasks with 70% accuracy independently to increase her ability to solve problems. Goal met: Pt returned a deductive reasoning task with 100% accuracy today. Pt does continue to display some difficulty with some reasoning, and would likely benefit from continued work in this area to increase accuracy. 3. The pt will describe with SBA, including use of function during description, to increase her ability to continue conversation when she is unable to independently generate the desired word. Goal partially met: The pt did very well with this for a difficulty at the beginning of a session. The pt however did later display difficulty with circumlocution and required assistance from the clinician to organized thoughts. 4. The pt will utilize internal memory strategies with Haily to independently recall 80% of information after a 20 min delay in time to increase her ability to complete ADL. Goal partially met: Pt has met the goal with items, to-do list, and most times reading of information, however the pt continues to require some work with auditory information. The pt has been able to meet the goal for some trials, but was only 68% accurate today. Assessment / Recommendations:    Other: Insuranced maxed out      AVIS Cazares 5/5/2021 2:22 PM    ________________________________________________________________________

## 2021-05-05 NOTE — PROGRESS NOTES
Speech Language Pathology: Daily Note      Patient Name: Anthony Arzola   2021   : 1970  [x]  Patient  Verified  Payor: Kaylie Seller / Plan: Petra Lundberg [id number] PPO / Product Type: PPO /   In time:1050am  Out time: 11:40am  Total Treatment Time (min): 50 min  1:1 Treatment Time (MC Only): 50 min  Visit #: 30 of 30    SUBJECTIVE  Pain Level (0-10 scale): 0    Subjective functional status/changes:   \"We're doing pretty good. The shot went fine. \"     OBJECTIVE  Treatment provided includes the following. Increase/Improve:  []  Voice Quality [x]  Expressive Language []  Oral Motor Skills   []  Vocal Loudness []  Auditory Comprehension []  Eating/Swallowing Skills   []  Vocal Cord Function []  Writing Skills []  Laryngeal/Pharyngeal Function   []  Resonance []  Reading Comprehension []   []  Breath Support/Coord. [x]  Cognitive-Linguistic Skills []   []  Speech Intelligibility []  Safety Awareness []   []  Articulation []  Attention []   []  Fluency [x]  Memory []     Decrease:  []  Dysphagia []  Apraxia []  Dysphonia   []  Dysarthria []  Dysfluency [x]  Cognitive Ling. Deficit   [x]  Aphasia []  Vocal Cord Dysfunction []  Dysphonia     Progression towards goals can be found in tasks completed in therapy (bold face type):   Pt was unable (likely due to aphasia) to accurately report internal memory strategies. Clinician reported strategies simply to pt, and asked her to utilize them as the clinician read the story. Pt independently recalled 2/7 pieces of information. She required Haily for 3/7 of verbal cues, and maxA for 2/7. Pt was able to report some visualizations, but required assistance with accurately reporting others. After approximately a 20 min delay, pt then recalled 4.5/7 pieces of information (she recalled memory strategy utilized for 1, but unable to accurately recall the piece of information that went with that. 1/7 with Haily of verbal/semantic cue, and 1/7 with modA.   We then returned to pt's homework 18/20 I'ly to generate an item in the category, 2/20 w/ maxA. Pt was asked to generate the word based on the description. 26/30 I'ly, 2/20 with Haily of verbal cues. Remainder maxA. Pt then had to unscramble sentences. 4/9 I'ly2/9 with Haily of asking questions in relation to portions that were correct, and maxA for 2 due to pt being unfamiliar (likely due to aphasia) with at least 1 item in the sentence. Clinician then educated pt and her  on the maintenance program being sent home. HEP: Maintenance plan sent home with pt. Today is her last therapy session due to exhausting insurance. Patient/Caregiver instruction/education: Pt was educated on pack of papers clinician was sending home with pt for maintenance plan. Pt was also assisted with internal memory strategies as she struggled to report them, and pt was educated on accuracy of homework tasks returned, and provided assistance with ones in error. Pain Level (0-10 scale) post treatment: 0    ASSESSMENT  []   Improving appropriately and progressing toward goals  [x]   Improving slowly and progressing toward goals  []   Approximating goals/maximum potential  [x]   Continues to benefit from skilled therapy to address remaining functional deficits  []   Not progressing toward goals and plan of care will be adjusted    PLAN   []  Continue Plan of Care    []  See progress note/recertification  []  Upgrade activities as tolerated      [x]  Discharge due to: No more visits per insurance.   []  Other:    Pricila Luis, SLP   5/5/2021,

## 2021-05-26 DIAGNOSIS — D32.0 BENIGN MENINGIOMA OF BRAIN (HCC): ICD-10-CM

## 2021-06-04 ENCOUNTER — CLINICAL SUPPORT (OUTPATIENT)
Dept: FAMILY MEDICINE CLINIC | Age: 51
End: 2021-06-04

## 2021-06-04 ENCOUNTER — HOSPITAL ENCOUNTER (OUTPATIENT)
Dept: INFUSION THERAPY | Age: 51
Discharge: HOME | End: 2021-06-04
Payer: COMMERCIAL

## 2021-06-04 VITALS
TEMPERATURE: 98.2 F | OXYGEN SATURATION: 97 % | WEIGHT: 147.93 LBS | DIASTOLIC BLOOD PRESSURE: 78 MMHG | BODY MASS INDEX: 24.62 KG/M2 | HEART RATE: 77 BPM | RESPIRATION RATE: 19 BRPM | SYSTOLIC BLOOD PRESSURE: 125 MMHG

## 2021-06-04 DIAGNOSIS — D32.0 BENIGN MENINGIOMA OF BRAIN (HCC): Primary | ICD-10-CM

## 2021-06-04 PROCEDURE — 74011250636 HC RX REV CODE- 250/636: Performed by: INTERNAL MEDICINE

## 2021-06-04 PROCEDURE — 96523 IRRIG DRUG DELIVERY DEVICE: CPT

## 2021-06-04 PROCEDURE — 77030012965 HC NDL HUBR BBMI -A

## 2021-06-04 RX ORDER — SODIUM CHLORIDE 0.9 % (FLUSH) 0.9 %
5-10 SYRINGE (ML) INJECTION AS NEEDED
Status: DISCONTINUED | OUTPATIENT
Start: 2021-06-04 | End: 2021-06-05 | Stop reason: HOSPADM

## 2021-06-04 RX ORDER — HEPARIN 100 UNIT/ML
500 SYRINGE INTRAVENOUS AS NEEDED
Status: DISCONTINUED | OUTPATIENT
Start: 2021-06-04 | End: 2021-06-05 | Stop reason: HOSPADM

## 2021-06-04 RX ADMIN — Medication 500 UNITS: at 11:49

## 2021-06-04 RX ADMIN — Medication 10 ML: at 11:48

## 2021-06-04 NOTE — PROGRESS NOTES
Learning Assessment 4/16/2021   PRIMARY LEARNER Patient   HIGHEST LEVEL OF EDUCATION - PRIMARY LEARNER  GRADUATED HIGH SCHOOL OR GED   BARRIERS PRIMARY LEARNER NONE   CO-LEARNER CAREGIVER No   PRIMARY LANGUAGE ENGLISH   LEARNER PREFERENCE PRIMARY READING   ANSWERED BY Patient   RELATIONSHIP SELF       After obtaining consent, and per orders of [facility], injection of Sandostatin 30 mg given by Home Loyola LPN. Patient instructed to remain in clinic for 20 minutes afterwards, and to report any adverse reaction to me immediately. CON [id number] ZRJ [id number]  RTC 1 month.

## 2021-06-04 NOTE — PROGRESS NOTES
[facility] OPIC Progress Note    Date: 2021    Name: Stephanie Lynn    MRN: [id number]         : 1970    Monthly Port flush     Ms. Maddie David was assessed . Opacity noted on left lens of eye glasses. Denies change in health issues. Ms. Pham's vitals were reviewed and patient was observed for 5 minutes prior to procedure. Visit Vitals  /78 (BP 1 Location: Left upper arm, BP Patient Position: Sitting)   Pulse 77   Temp 98.2 °F (36.8 °C)   Resp 19   Wt 67.1 kg (147 lb 14.9 oz)   SpO2 97%   Breastfeeding No   BMI 24.62 kg/m²       Mediport was accessed with 20 gauge 1 inch florez after chloroprep. Right side - single lumen    Blood return: Yes         Flushed with 10 ml  of NS followed by Heparin 500u    Florez needle removed. Band-Aid applied. Ms. Maddie David tolerated the procedure, and had no complaints. Ms. Maddie David was discharged from [place] [address] in stable condition at 1150 . She is to return on   for her next appointment.     Domonique Guzman RN  2021  12:12 PM

## 2021-06-23 DIAGNOSIS — D32.0 BENIGN MENINGIOMA OF BRAIN (HCC): ICD-10-CM

## 2021-07-02 ENCOUNTER — HOSPITAL ENCOUNTER (OUTPATIENT)
Dept: INFUSION THERAPY | Age: 51
End: 2021-07-02

## 2021-07-06 ENCOUNTER — CLINICAL SUPPORT (OUTPATIENT)
Dept: FAMILY MEDICINE CLINIC | Age: 51
End: 2021-07-06

## 2021-07-06 DIAGNOSIS — D32.0 BENIGN MENINGIOMA OF BRAIN (HCC): Primary | ICD-10-CM

## 2021-07-06 NOTE — PROGRESS NOTES
Learning Assessment 4/16/2021   PRIMARY LEARNER Patient   HIGHEST LEVEL OF EDUCATION - PRIMARY LEARNER  GRADUATED HIGH SCHOOL OR GED   BARRIERS PRIMARY LEARNER NONE   CO-LEARNER CAREGIVER No   PRIMARY LANGUAGE ENGLISH   LEARNER PREFERENCE PRIMARY READING   ANSWERED BY Patient   RELATIONSHIP SELF         After obtaining consent, and per orders of Josefina Mtz , injection of Sandostatin 30 mg given by Sloane De La Cruz LPN. Patient instructed to remain in clinic for 20 minutes afterwards, and to report any adverse reaction to me immediately. COLMENARES [id number] VKS 87.56.12  RTC 1 month.

## 2021-07-13 ENCOUNTER — HOSPITAL ENCOUNTER (OUTPATIENT)
Dept: INFUSION THERAPY | Age: 51
Discharge: HOME | End: 2021-07-13
Payer: COMMERCIAL

## 2021-07-13 VITALS
BODY MASS INDEX: 24.86 KG/M2 | HEART RATE: 98 BPM | SYSTOLIC BLOOD PRESSURE: 118 MMHG | DIASTOLIC BLOOD PRESSURE: 71 MMHG | OXYGEN SATURATION: 98 % | TEMPERATURE: 98.1 F | WEIGHT: 149.4 LBS | RESPIRATION RATE: 19 BRPM

## 2021-07-13 PROCEDURE — 74011250636 HC RX REV CODE- 250/636: Performed by: NURSE PRACTITIONER

## 2021-07-13 PROCEDURE — 77030012965 HC NDL HUBR BBMI -A

## 2021-07-13 PROCEDURE — 36591 DRAW BLOOD OFF VENOUS DEVICE: CPT

## 2021-07-13 PROCEDURE — 96523 IRRIG DRUG DELIVERY DEVICE: CPT

## 2021-07-13 RX ORDER — HEPARIN 100 UNIT/ML
500 SYRINGE INTRAVENOUS AS NEEDED
Status: DISCONTINUED | OUTPATIENT
Start: 2021-07-13 | End: 2021-07-14 | Stop reason: HOSPADM

## 2021-07-13 RX ORDER — SODIUM CHLORIDE 0.9 % (FLUSH) 0.9 %
5-10 SYRINGE (ML) INJECTION AS NEEDED
Status: DISCONTINUED | OUTPATIENT
Start: 2021-07-13 | End: 2021-07-14 | Stop reason: HOSPADM

## 2021-07-13 RX ADMIN — Medication 500 UNITS: at 11:29

## 2021-07-13 RX ADMIN — Medication 10 ML: at 11:29

## 2021-07-13 NOTE — PROGRESS NOTES
[facility] OPIC Progress Note    Date: 2021    Name: Miriam Wood    MRN: [id number]         : 1970    Monthly Port flush     Ms. Harry Ocasio was assessed and education was provided. Ms. Pham's vitals were reviewed and patient was observed for 5 minutes prior to procedure. There were no vitals taken for this visit. Mediport was accessed with 20 gauge, 1 inch florez after chloroprep.    right chest, single  Blood return: YES    Flushed 10 of NS followed by Heparin 500u    Florez needle removed and site intact. Band-Aid applied. Ms. Harry Ocasio tolerated the procedure, and had no complaints. Ms. Harry Ocasio was discharged from [place] [address] in stable condition at 1135. She is to return on  at 1130 for her next appointment.     Clau Arguello RN  2021  12:05 PM

## 2021-07-21 DIAGNOSIS — D32.0 BENIGN MENINGIOMA OF BRAIN (HCC): ICD-10-CM

## 2021-08-02 ENCOUNTER — CLINICAL SUPPORT (OUTPATIENT)
Dept: FAMILY MEDICINE CLINIC | Age: 51
End: 2021-08-02
Payer: COMMERCIAL

## 2021-08-02 ENCOUNTER — TELEPHONE (OUTPATIENT)
Dept: FAMILY MEDICINE CLINIC | Age: 51
End: 2021-08-02

## 2021-08-02 VITALS — TEMPERATURE: 97.5 F

## 2021-08-02 DIAGNOSIS — D32.0 BENIGN MENINGIOMA OF BRAIN (HCC): ICD-10-CM

## 2021-08-02 DIAGNOSIS — Z92.3 HISTORY OF HEAD AND NECK RADIATION: ICD-10-CM

## 2021-08-02 DIAGNOSIS — Z11.59 SCREENING FOR VIRAL DISEASE: ICD-10-CM

## 2021-08-02 DIAGNOSIS — Z13.220 SCREENING, LIPID: ICD-10-CM

## 2021-08-02 DIAGNOSIS — Z92.3 HISTORY OF HEAD AND NECK RADIATION: Primary | ICD-10-CM

## 2021-08-02 PROCEDURE — 36415 COLL VENOUS BLD VENIPUNCTURE: CPT | Performed by: NURSE PRACTITIONER

## 2021-08-02 NOTE — PROGRESS NOTES
Learning Assessment 4/16/2021   PRIMARY LEARNER Patient   HIGHEST LEVEL OF EDUCATION - PRIMARY LEARNER  GRADUATED HIGH SCHOOL OR GED   BARRIERS PRIMARY LEARNER NONE   CO-LEARNER CAREGIVER No   PRIMARY LANGUAGE ENGLISH   LEARNER PREFERENCE PRIMARY READING   ANSWERED BY Patient   RELATIONSHIP SELF         After obtaining consent, and per orders of Dino Soler , injection of Sandostatin 30 mg given by Sylvester Strauss LPN. Patient instructed to remain in clinic for 20 minutes afterwards, and to report any adverse reaction to me immediately. YPP [id number] BRW 91.08.32  RTC 1 month.

## 2021-08-02 NOTE — TELEPHONE ENCOUNTER
KIRA Albright to confirm it there is any other requested labs from Dr. Raimundo Kulkarni. Per Lorraine Tripathi, she will check with the nursing staff with Dr. Sal Diaz office, if not able to SW anyone, she will have to put a message through to the nurse for a returned call to our office. Anai Chirinos / Dr. Sal Diaz nurse, she questioned the labs that was already ordered by Janis Palma? She stated it seems like the same labs Dr. Raimundo Kulkarni normally orders, can add a CBC with the 5 differential count, but not sure if he would need a HgbA1C, he is not in the office, so cannot speak for him. I stated to her if she finds out otherwise, for the lavender top, it holds for 24 hours and also I believes as the CBC with Diff that already has been ordered is the same as what she is speaking of, so we can do an add on. She was given our phone # for if any changes are needed. Bernabe Fields stated OK, thanks and if we could please fax over copy of the results to Dr. Raimundo Kulkarni as well 2 [phone].

## 2021-08-02 NOTE — TELEPHONE ENCOUNTER
I've ordered thyroid and cortisol levels as well as some baseline labs. I also suggest reaching out to Dr. Margaret Roy to see if he needed anything.

## 2021-08-02 NOTE — TELEPHONE ENCOUNTER
Carmen Cagle, I wanted to check with you if you was aware of BW needed for Ms [facility] before checking with Dr. Clotilde Toure / [facility] office. The lab visit states Thyroid labs / Cortisol, did not see any orders in the system ,so was not sure of which thyroid test? I did see a TSH from labs done 08/2020, thanks.

## 2021-08-02 NOTE — PROGRESS NOTES
Patient presented to the office for BW via right arm venipuncture, was a difficult stick, but no other complications, 2 SST, 1 green and 1 lavender was sent. Please fax a copy of the lab results to Dr. Kelly Hammonds office, # [phone] per his nurse Katlyn Hyatt, see other phone note Encounter for any needed additional information.

## 2021-08-02 NOTE — TELEPHONE ENCOUNTER
PC to confirm from whom her BW orders would be from, I thought I had written it down. She stated, she thought that it was Dina Lora herself, but if not, Dr. Leslie Just.

## 2021-08-03 ENCOUNTER — TELEPHONE (OUTPATIENT)
Dept: FAMILY MEDICINE CLINIC | Age: 51
End: 2021-08-03

## 2021-08-03 NOTE — TELEPHONE ENCOUNTER
----- Message from Michiel Essex sent at 8/3/2021  8:40 AM EDT -----  Regarding: [place]/Telephone  Contact: [phone]  General Message/Vendor Calls    Caller's first and last name: Karina Kaplan,       Reason for call: Blood work yesterday. Callback required yes/no and why: Yes/Confirm      Best contact number(s):[phone]      Details to clarify the request: Chrisntchaitanya Kaplan wanted to let the lab know that the patient was being tested for thyroid and cortisol when she was getting her blood work done yesterday.         Michiel Essex

## 2021-08-04 LAB
ALBUMIN SERPL-MCNC: 5.1 G/DL (ref 3.8–4.9)
ALBUMIN/GLOB SERPL: 2.6 {RATIO} (ref 1.2–2.2)
ALP SERPL-CCNC: 92 IU/L (ref 48–121)
ALT SERPL-CCNC: 24 IU/L (ref 0–32)
AST SERPL-CCNC: 22 IU/L (ref 0–40)
BASOPHILS # BLD AUTO: 0 X10E3/UL (ref 0–0.2)
BASOPHILS NFR BLD AUTO: 1 %
BILIRUB SERPL-MCNC: 0.3 MG/DL (ref 0–1.2)
BUN SERPL-MCNC: 12 MG/DL (ref 6–24)
BUN/CREAT SERPL: 12 (ref 9–23)
CALCIUM SERPL-MCNC: 10.1 MG/DL (ref 8.7–10.2)
CHLORIDE SERPL-SCNC: 102 MMOL/L (ref 96–106)
CHOLEST SERPL-MCNC: 159 MG/DL (ref 100–199)
CO2 SERPL-SCNC: 25 MMOL/L (ref 20–29)
CORTIS SERPL-MCNC: 15.3 UG/DL
CREAT SERPL-MCNC: 0.97 MG/DL (ref 0.57–1)
EOSINOPHIL # BLD AUTO: 0.1 X10E3/UL (ref 0–0.4)
EOSINOPHIL NFR BLD AUTO: 1 %
ERYTHROCYTE [DISTWIDTH] IN BLOOD BY AUTOMATED COUNT: 12.6 % (ref 11.7–15.4)
GLOBULIN SER CALC-MCNC: 2 G/DL (ref 1.5–4.5)
GLUCOSE SERPL-MCNC: 113 MG/DL (ref 65–99)
HCT VFR BLD AUTO: 41.8 % (ref 34–46.6)
HCV AB S/CO SERPL IA: <0.1 S/CO RATIO (ref 0–0.9)
HCV AB SERPL QL IA: NORMAL
HDLC SERPL-MCNC: 51 MG/DL
HGB BLD-MCNC: 13.7 G/DL (ref 11.1–15.9)
IMM GRANULOCYTES # BLD AUTO: 0 X10E3/UL (ref 0–0.1)
IMM GRANULOCYTES NFR BLD AUTO: 0 %
LDLC SERPL CALC-MCNC: 75 MG/DL (ref 0–99)
LYMPHOCYTES # BLD AUTO: 2.1 X10E3/UL (ref 0.7–3.1)
LYMPHOCYTES NFR BLD AUTO: 35 %
MCH RBC QN AUTO: 29.8 PG (ref 26.6–33)
MCHC RBC AUTO-ENTMCNC: 32.8 G/DL (ref 31.5–35.7)
MCV RBC AUTO: 91 FL (ref 79–97)
MONOCYTES # BLD AUTO: 0.4 X10E3/UL (ref 0.1–0.9)
MONOCYTES NFR BLD AUTO: 6 %
NEUTROPHILS # BLD AUTO: 3.3 X10E3/UL (ref 1.4–7)
NEUTROPHILS NFR BLD AUTO: 57 %
PLATELET # BLD AUTO: 340 X10E3/UL (ref 150–450)
POTASSIUM SERPL-SCNC: 4.1 MMOL/L (ref 3.5–5.2)
PROT SERPL-MCNC: 7.1 G/DL (ref 6–8.5)
RBC # BLD AUTO: 4.6 X10E6/UL (ref 3.77–5.28)
SODIUM SERPL-SCNC: 143 MMOL/L (ref 134–144)
TRIGL SERPL-MCNC: 198 MG/DL (ref 0–149)
TSH SERPL DL<=0.005 MIU/L-ACNC: 4.63 UIU/ML (ref 0.45–4.5)
VLDLC SERPL CALC-MCNC: 33 MG/DL (ref 5–40)
WBC # BLD AUTO: 5.9 X10E3/UL (ref 3.4–10.8)

## 2021-08-05 ENCOUNTER — TELEPHONE (OUTPATIENT)
Dept: FAMILY MEDICINE CLINIC | Age: 51
End: 2021-08-05

## 2021-08-05 NOTE — PROGRESS NOTES
Please call, labs are stable. Thyroid function test was up slightly but I've ordered a follow up test. We will be back in touch when the second test results.

## 2021-08-05 NOTE — TELEPHONE ENCOUNTER
----- Message from Esme Almaraz sent at 8/5/2021  2:35 PM EDT -----  Regarding: LOUISA [place]/Alisha  Patient return call    Caller's first and last name and relationship (if not the patient): pt      Best contact number(s): [phone]      Whose call is being returned: nurse      Details to clarify the request: pt trying return missed call, please call back.       Esme Almaraz

## 2021-08-05 NOTE — PROGRESS NOTES
Patient verified by stating name and date of birth.  Patient informed of lab results and states understanding per Bennett Carr

## 2021-08-06 NOTE — TELEPHONE ENCOUNTER
Kulwinder Hodges   8/5/2021  3:26 PM EDT       Patient verified by stating name and date of birth.  Patient informed of lab results and states understanding per Cherelle Reagan

## 2021-08-17 ENCOUNTER — HOSPITAL ENCOUNTER (OUTPATIENT)
Dept: INFUSION THERAPY | Age: 51
Discharge: HOME | End: 2021-08-17
Payer: COMMERCIAL

## 2021-08-17 VITALS
DIASTOLIC BLOOD PRESSURE: 77 MMHG | HEART RATE: 67 BPM | WEIGHT: 148.6 LBS | RESPIRATION RATE: 17 BRPM | SYSTOLIC BLOOD PRESSURE: 128 MMHG | OXYGEN SATURATION: 97 % | BODY MASS INDEX: 24.73 KG/M2 | TEMPERATURE: 98.3 F

## 2021-08-17 PROCEDURE — 77030012965 HC NDL HUBR BBMI -A

## 2021-08-17 PROCEDURE — 96523 IRRIG DRUG DELIVERY DEVICE: CPT

## 2021-08-17 PROCEDURE — 74011250636 HC RX REV CODE- 250/636: Performed by: NURSE PRACTITIONER

## 2021-08-17 RX ORDER — HEPARIN 100 UNIT/ML
500 SYRINGE INTRAVENOUS AS NEEDED
Status: DISCONTINUED | OUTPATIENT
Start: 2021-08-17 | End: 2021-08-18 | Stop reason: HOSPADM

## 2021-08-17 RX ORDER — SODIUM CHLORIDE 0.9 % (FLUSH) 0.9 %
5-10 SYRINGE (ML) INJECTION AS NEEDED
Status: DISCONTINUED | OUTPATIENT
Start: 2021-08-17 | End: 2021-08-18 | Stop reason: HOSPADM

## 2021-08-17 RX ADMIN — Medication 500 UNITS: at 15:22

## 2021-08-17 RX ADMIN — Medication 10 ML: at 15:22

## 2021-08-17 NOTE — PROGRESS NOTES
[facility] OPIC Progress Note    Date: 2021    Name: Peggy De Paz    MRN: [id number]         : 1970    Monthly Port flush       Ms. Pham's vitals were reviewed and patient was observed for 5 minutes prior to procedure. Visit Vitals  /77 (BP 1 Location: Left upper arm, BP Patient Position: Sitting)   Pulse 67   Temp 98.3 °F (36.8 °C)   Resp 17   Wt 67.4 kg (148 lb 9.6 oz)   SpO2 97%   BMI 24.73 kg/m²       Mediport was accessed with 20 gauge, short florez(s) after chloroprep.    right chest, single    Blood return: YES    Flushed 10 of NS followed by Heparin 500u    Florez needle(s) removed. Band-Aid applied. Ms. Aroldo Monge tolerated the procedure, and had no complaints. Ms. Aroldo Monge was discharged from [place] [address] in stable condition at 1530. She is to return on 2021 at 1500 for her next appointment.     Wilbert Lux RN  2021  3:55 PM

## 2021-08-30 DIAGNOSIS — D32.0 BENIGN MENINGIOMA OF BRAIN (HCC): ICD-10-CM

## 2021-09-02 ENCOUNTER — CLINICAL SUPPORT (OUTPATIENT)
Dept: FAMILY MEDICINE CLINIC | Age: 51
End: 2021-09-02

## 2021-09-02 VITALS — TEMPERATURE: 98.1 F

## 2021-09-02 DIAGNOSIS — D32.0 BENIGN MENINGIOMA OF BRAIN (HCC): Primary | ICD-10-CM

## 2021-09-13 ENCOUNTER — HOSPITAL ENCOUNTER (OUTPATIENT)
Dept: MAMMOGRAPHY | Age: 51
Discharge: HOME | End: 2021-09-13
Payer: COMMERCIAL

## 2021-09-13 DIAGNOSIS — Z12.31 VISIT FOR SCREENING MAMMOGRAM: ICD-10-CM

## 2021-09-13 PROCEDURE — 77063 BREAST TOMOSYNTHESIS BI: CPT

## 2021-09-14 ENCOUNTER — APPOINTMENT (OUTPATIENT)
Dept: INFUSION THERAPY | Age: 51
End: 2021-09-14

## 2021-09-22 ENCOUNTER — HOSPITAL ENCOUNTER (OUTPATIENT)
Dept: INFUSION THERAPY | Age: 51
Discharge: HOME | End: 2021-09-22
Payer: COMMERCIAL

## 2021-09-22 VITALS
WEIGHT: 149 LBS | DIASTOLIC BLOOD PRESSURE: 70 MMHG | RESPIRATION RATE: 16 BRPM | SYSTOLIC BLOOD PRESSURE: 120 MMHG | HEART RATE: 72 BPM | BODY MASS INDEX: 24.79 KG/M2 | OXYGEN SATURATION: 97 % | TEMPERATURE: 96 F

## 2021-09-22 PROCEDURE — 96523 IRRIG DRUG DELIVERY DEVICE: CPT

## 2021-09-22 PROCEDURE — 77030012965 HC NDL HUBR BBMI -A

## 2021-09-22 PROCEDURE — 74011250636 HC RX REV CODE- 250/636: Performed by: NURSE PRACTITIONER

## 2021-09-22 RX ORDER — SODIUM CHLORIDE 0.9 % (FLUSH) 0.9 %
5-10 SYRINGE (ML) INJECTION AS NEEDED
Status: DISCONTINUED | OUTPATIENT
Start: 2021-09-22 | End: 2021-09-23 | Stop reason: HOSPADM

## 2021-09-22 RX ORDER — HEPARIN 100 UNIT/ML
500 SYRINGE INTRAVENOUS AS NEEDED
Status: DISCONTINUED | OUTPATIENT
Start: 2021-09-22 | End: 2021-09-23 | Stop reason: HOSPADM

## 2021-09-22 RX ADMIN — Medication 10 ML: at 15:30

## 2021-09-22 RX ADMIN — Medication 500 UNITS: at 15:30

## 2021-09-22 NOTE — PROGRESS NOTES
[facility] OPIC Progress Note    Date: 2021    Name: Reza Deutsch    MRN: [id number]         : 1970    Monthly Port flush     Ms. Adriana Allen was assessed and education was provided. She denies new problems. Ms. Pham's vitals were reviewed and patient was observed for 5 minutes prior to procedure. Visit Vitals  /70 (BP 1 Location: Left upper arm, BP Patient Position: Sitting)   Pulse 72   Temp (!) 96 °F (35.6 °C)   Resp 16   Wt 67.6 kg (149 lb)   SpO2 97%   BMI 24.79 kg/m²       Mediport was accessed with 20 gauge,1 inch florez after chloroprep.    right chest, single  Blood return: YES    Flushed 10 ml of NS followed by Heparin 500u    Florez needle removed and site intact. Band-Aid applied. Ms. Adriana Allen tolerated the procedure, and had no complaints. Ms. Adriana Allen was discharged from [place] [address] in stable condition at [phone]. She is to return on  at 1500 for her next appointment. Her  states she will be traveling for about a month prior.     Erin Downey RN  2021  3:58 PM

## 2021-09-27 DIAGNOSIS — D32.0 BENIGN MENINGIOMA OF BRAIN (HCC): ICD-10-CM

## 2021-11-02 ENCOUNTER — HOSPITAL ENCOUNTER (OUTPATIENT)
Dept: INFUSION THERAPY | Age: 51
Discharge: HOME | End: 2021-11-02

## 2021-11-02 RX ORDER — SODIUM CHLORIDE 0.9 % (FLUSH) 0.9 %
5-10 SYRINGE (ML) INJECTION AS NEEDED
Status: CANCELLED | OUTPATIENT
Start: 2021-11-02 | End: 2021-11-03

## 2021-11-02 RX ORDER — HEPARIN 100 UNIT/ML
500 SYRINGE INTRAVENOUS AS NEEDED
Status: CANCELLED | OUTPATIENT
Start: 2021-11-02 | End: 2021-11-03

## 2021-11-08 ENCOUNTER — CLINICAL SUPPORT (OUTPATIENT)
Dept: FAMILY MEDICINE CLINIC | Age: 51
End: 2021-11-08

## 2021-11-08 VITALS — TEMPERATURE: 97.6 F

## 2021-11-08 DIAGNOSIS — D32.0 BENIGN MENINGIOMA OF BRAIN (HCC): Primary | ICD-10-CM

## 2021-11-08 DIAGNOSIS — Z92.3 HISTORY OF HEAD AND NECK RADIATION: ICD-10-CM

## 2021-11-09 ENCOUNTER — HOSPITAL ENCOUNTER (OUTPATIENT)
Dept: INFUSION THERAPY | Age: 51
Discharge: HOME | End: 2021-11-09
Payer: COMMERCIAL

## 2021-11-09 VITALS
TEMPERATURE: 98.1 F | SYSTOLIC BLOOD PRESSURE: 120 MMHG | WEIGHT: 155.6 LBS | OXYGEN SATURATION: 96 % | BODY MASS INDEX: 25.89 KG/M2 | DIASTOLIC BLOOD PRESSURE: 67 MMHG | HEART RATE: 72 BPM | RESPIRATION RATE: 17 BRPM

## 2021-11-09 DIAGNOSIS — D32.0 BENIGN MENINGIOMA OF BRAIN (HCC): ICD-10-CM

## 2021-11-09 PROCEDURE — 74011250636 HC RX REV CODE- 250/636: Performed by: NURSE PRACTITIONER

## 2021-11-09 PROCEDURE — 77030012965 HC NDL HUBR BBMI -A

## 2021-11-09 PROCEDURE — 96523 IRRIG DRUG DELIVERY DEVICE: CPT

## 2021-11-09 RX ORDER — SODIUM CHLORIDE 0.9 % (FLUSH) 0.9 %
5-10 SYRINGE (ML) INJECTION AS NEEDED
Status: DISCONTINUED | OUTPATIENT
Start: 2021-11-09 | End: 2021-11-10 | Stop reason: HOSPADM

## 2021-11-09 RX ORDER — HEPARIN 100 UNIT/ML
500 SYRINGE INTRAVENOUS AS NEEDED
Status: DISCONTINUED | OUTPATIENT
Start: 2021-11-09 | End: 2021-11-10 | Stop reason: HOSPADM

## 2021-11-09 RX ADMIN — Medication 500 UNITS: at 15:40

## 2021-11-09 RX ADMIN — Medication 10 ML: at 15:40

## 2021-11-09 NOTE — PROGRESS NOTES
[facility] OPIC Progress Note    Date: 2021    Name: Papito Waller    MRN: [id number]         : 1970    Monthly Port flush     Ms. Pham's vitals were reviewed and patient was observed for 5 minutes prior to procedure. Visit Vitals  /67 (BP 1 Location: Left upper arm, BP Patient Position: Sitting)   Pulse 72   Temp 98.1 °F (36.7 °C)   Resp 17   Wt 70.6 kg (155 lb 9.6 oz)   SpO2 96%   BMI 25.89 kg/m²       Mediport was accessed with 20 gauge, short florez(s) after chloroprep.    right chest, single    Blood return: YES      Flushed 10 of NS followed by Heparin 500u    Florez needle(s) removed. Band-Aid applied. Ms. Yara Copeland tolerated the procedure, and had no complaints. Ms. Yara Copeland was discharged from [place] [address] in stable condition at [phone]. She is to return on 2021 at  for her next appointment.     Conor Abreu RN  2021  3:55 PM

## 2021-11-23 ENCOUNTER — CLINICAL SUPPORT (OUTPATIENT)
Dept: FAMILY MEDICINE CLINIC | Age: 51
End: 2021-11-23
Payer: COMMERCIAL

## 2021-11-23 VITALS — TEMPERATURE: 97.8 F

## 2021-11-23 DIAGNOSIS — Z23 ENCOUNTER FOR IMMUNIZATION: Primary | ICD-10-CM

## 2021-11-23 PROCEDURE — 90686 IIV4 VACC NO PRSV 0.5 ML IM: CPT | Performed by: NURSE PRACTITIONER

## 2021-11-23 PROCEDURE — 90471 IMMUNIZATION ADMIN: CPT | Performed by: NURSE PRACTITIONER

## 2021-11-23 NOTE — PROGRESS NOTES
Patient presented to the office for Flu vaccine, see admin. Patient stated she did have her booster Covid vaccine done just recently within a week, per CDC guidelines, it is OK if patient chooses to have it done.

## 2021-12-07 ENCOUNTER — HOSPITAL ENCOUNTER (OUTPATIENT)
Dept: INFUSION THERAPY | Age: 51
Discharge: HOME | End: 2021-12-07
Payer: COMMERCIAL

## 2021-12-07 VITALS
HEART RATE: 67 BPM | DIASTOLIC BLOOD PRESSURE: 71 MMHG | SYSTOLIC BLOOD PRESSURE: 117 MMHG | WEIGHT: 154 LBS | RESPIRATION RATE: 18 BRPM | TEMPERATURE: 96.7 F | BODY MASS INDEX: 25.63 KG/M2

## 2021-12-07 PROCEDURE — 96523 IRRIG DRUG DELIVERY DEVICE: CPT

## 2021-12-07 PROCEDURE — 74011250636 HC RX REV CODE- 250/636: Performed by: NURSE PRACTITIONER

## 2021-12-07 PROCEDURE — 77030012965 HC NDL HUBR BBMI -A

## 2021-12-07 RX ORDER — SODIUM CHLORIDE 0.9 % (FLUSH) 0.9 %
5-10 SYRINGE (ML) INJECTION AS NEEDED
Status: DISCONTINUED | OUTPATIENT
Start: 2021-12-07 | End: 2021-12-08 | Stop reason: HOSPADM

## 2021-12-07 RX ORDER — HEPARIN 100 UNIT/ML
500 SYRINGE INTRAVENOUS AS NEEDED
Status: DISCONTINUED | OUTPATIENT
Start: 2021-12-07 | End: 2021-12-08 | Stop reason: HOSPADM

## 2021-12-07 RX ADMIN — Medication 10 ML: at 15:45

## 2021-12-07 RX ADMIN — Medication 500 UNITS: at 15:46

## 2021-12-07 NOTE — PROGRESS NOTES
[facility] OPIC Progress Note    Date: 2021    Name: Holly Whitehead    MRN: [id number]         : 1970    Monthly Port flush     Ms. Colleen Gallardo was assessed and education was provided. She denies new problems. Ms. Pham's vitals were reviewed and patient was observed for 5 minutes prior to procedure. Visit Vitals  /71 (BP 1 Location: Right upper arm, BP Patient Position: Sitting)   Pulse 67   Temp (!) 96.7 °F (35.9 °C)   Resp 18   Wt 69.9 kg (154 lb)   BMI 25.63 kg/m²       Mediport was accessed with 20 gauge, 1 inch florez after chloroprep.    right chest, single  Blood return: YES    Flushed 10 ml of NS followed by Heparin 500u    Florez needle removed and site intact. Band-Aid applied. Ms. Colleen Gallardo tolerated the procedure, and had no complaints. Ms. Colleen Gallardo was discharged from [place] [address] in stable condition at 1550. She is to return on  at 3:30 for her next appointment.     Laureano Monahan RN  2021  4:19 PM Patient restless and anxious, indicates that she can't sleep, takes ativan at home, medical resident notified, will give ativan when verified by pharmacy

## 2021-12-09 ENCOUNTER — CLINICAL SUPPORT (OUTPATIENT)
Dept: FAMILY MEDICINE CLINIC | Age: 51
End: 2021-12-09
Payer: COMMERCIAL

## 2021-12-09 DIAGNOSIS — D32.0 BENIGN MENINGIOMA OF BRAIN (HCC): Primary | ICD-10-CM

## 2021-12-09 PROCEDURE — 96372 THER/PROPH/DIAG INJ SC/IM: CPT | Performed by: NURSE PRACTITIONER

## 2021-12-28 ENCOUNTER — TELEPHONE (OUTPATIENT)
Dept: FAMILY MEDICINE CLINIC | Age: 51
End: 2021-12-28

## 2021-12-28 RX ORDER — SENNOSIDES 25 MG/1
TABLET, FILM COATED ORAL
Qty: 45 G | Refills: 2 | Status: SHIPPED | OUTPATIENT
Start: 2021-12-28 | End: 2022-03-11 | Stop reason: ALTCHOICE

## 2021-12-28 NOTE — TELEPHONE ENCOUNTER
Patient has been using lidocaine cream pn her injection sites. She is almost out and would like to get a refill. Do not find on current med list.   Uses [facility] in [place].

## 2022-01-04 ENCOUNTER — HOSPITAL ENCOUNTER (OUTPATIENT)
Dept: INFUSION THERAPY | Age: 52
Discharge: HOME | End: 2022-01-04
Payer: COMMERCIAL

## 2022-01-04 VITALS
SYSTOLIC BLOOD PRESSURE: 100 MMHG | TEMPERATURE: 98.1 F | HEART RATE: 79 BPM | RESPIRATION RATE: 17 BRPM | WEIGHT: 151 LBS | DIASTOLIC BLOOD PRESSURE: 65 MMHG | BODY MASS INDEX: 25.13 KG/M2

## 2022-01-04 PROCEDURE — 74011000250 HC RX REV CODE- 250: Performed by: NURSE PRACTITIONER

## 2022-01-04 PROCEDURE — 74011250636 HC RX REV CODE- 250/636: Performed by: NURSE PRACTITIONER

## 2022-01-04 PROCEDURE — 77030012965 HC NDL HUBR BBMI -A

## 2022-01-04 PROCEDURE — 96523 IRRIG DRUG DELIVERY DEVICE: CPT

## 2022-01-04 RX ORDER — HEPARIN 100 UNIT/ML
500 SYRINGE INTRAVENOUS AS NEEDED
Status: DISCONTINUED | OUTPATIENT
Start: 2022-01-04 | End: 2022-01-05 | Stop reason: HOSPADM

## 2022-01-04 RX ORDER — SODIUM CHLORIDE 0.9 % (FLUSH) 0.9 %
5-10 SYRINGE (ML) INJECTION AS NEEDED
Status: DISCONTINUED | OUTPATIENT
Start: 2022-01-04 | End: 2022-01-05 | Stop reason: HOSPADM

## 2022-01-04 RX ADMIN — SODIUM CHLORIDE, PRESERVATIVE FREE 10 ML: 5 INJECTION INTRAVENOUS at 15:45

## 2022-01-04 RX ADMIN — Medication 500 UNITS: at 15:45

## 2022-01-04 NOTE — PROGRESS NOTES
\[facility]"" OPIC Progress Note    Date: 2022    Name: Ivania Fitch    MRN: [id number]         : 1970    Monthly Port flush     Ms. Johanna Dos Santos was assessed and education was provided. Ms. Pham's vitals were reviewed and patient was observed for 5 minutes prior to procedure. Visit Vitals  /65 (BP 1 Location: Left upper arm, BP Patient Position: Sitting)   Pulse 79   Temp 98.1 °F (36.7 °C)   Resp 17   Wt 68.5 kg (151 lb)   BMI 25.13 kg/m²       Mediport was accessed with 20 gauge, short florez(s) after chloroprep.    right chest, single    Blood return: YES    Flushed 10 of NS followed by Heparin 500u    Florez needle(s) removed. Band-Aid applied. Ms. Johanna Dos Santos tolerated the procedure, and had no complaints. Ms. Johanna Dos Santos was discharged from [place] [address] in stable condition at 1550. She is to return on 2021 at  for her next appointment.     Alonzo Stokes RN  2022  4:51 PM

## 2022-01-10 ENCOUNTER — CLINICAL SUPPORT (OUTPATIENT)
Dept: FAMILY MEDICINE CLINIC | Age: 52
End: 2022-01-10

## 2022-01-10 DIAGNOSIS — D32.0 BENIGN MENINGIOMA OF BRAIN (HCC): Primary | ICD-10-CM

## 2022-01-10 NOTE — PROGRESS NOTES
Sandostatin LAR Depot injection given in L hip per Zak Puga [facility]#[id number] Exp:04-

## 2022-02-01 ENCOUNTER — HOSPITAL ENCOUNTER (OUTPATIENT)
Dept: INFUSION THERAPY | Age: 52
Discharge: HOME | End: 2022-02-01

## 2022-02-01 RX ORDER — SODIUM CHLORIDE 9 MG/ML
10 INJECTION INTRAMUSCULAR; INTRAVENOUS; SUBCUTANEOUS AS NEEDED
Status: CANCELLED | OUTPATIENT
Start: 2022-02-01 | End: 2022-02-02

## 2022-02-01 RX ORDER — HEPARIN 100 UNIT/ML
500 SYRINGE INTRAVENOUS AS NEEDED
Status: CANCELLED | OUTPATIENT
Start: 2022-02-01 | End: 2022-02-02

## 2022-02-01 NOTE — PROGRESS NOTES
[facility] Short Visit Note:    9632:  MXOKHR patient because she did not show for her port flush. Reports she forgot about her appointment. Rescheduled for Thursday, February 3, 2022 at 1500.

## 2022-02-03 ENCOUNTER — APPOINTMENT (OUTPATIENT)
Dept: INFUSION THERAPY | Age: 52
End: 2022-02-03

## 2022-02-09 ENCOUNTER — HOSPITAL ENCOUNTER (OUTPATIENT)
Dept: INFUSION THERAPY | Age: 52
Discharge: HOME | End: 2022-02-09
Payer: COMMERCIAL

## 2022-02-09 VITALS
BODY MASS INDEX: 25.59 KG/M2 | RESPIRATION RATE: 16 BRPM | WEIGHT: 153.8 LBS | HEART RATE: 71 BPM | SYSTOLIC BLOOD PRESSURE: 119 MMHG | DIASTOLIC BLOOD PRESSURE: 73 MMHG | TEMPERATURE: 97.9 F

## 2022-02-09 DIAGNOSIS — D32.0 BENIGN MENINGIOMA OF BRAIN (HCC): Primary | ICD-10-CM

## 2022-02-09 PROCEDURE — 77030012965 HC NDL HUBR BBMI -A

## 2022-02-09 PROCEDURE — 96523 IRRIG DRUG DELIVERY DEVICE: CPT

## 2022-02-09 PROCEDURE — 74011000250 HC RX REV CODE- 250: Performed by: INTERNAL MEDICINE

## 2022-02-09 PROCEDURE — 74011250636 HC RX REV CODE- 250/636: Performed by: INTERNAL MEDICINE

## 2022-02-09 RX ORDER — SODIUM CHLORIDE 9 MG/ML
10 INJECTION INTRAMUSCULAR; INTRAVENOUS; SUBCUTANEOUS AS NEEDED
Status: CANCELLED | OUTPATIENT
Start: 2022-02-27

## 2022-02-09 RX ORDER — SODIUM CHLORIDE 0.9 % (FLUSH) 0.9 %
5-10 SYRINGE (ML) INJECTION AS NEEDED
Status: DISCONTINUED | OUTPATIENT
Start: 2022-02-09 | End: 2022-02-10 | Stop reason: HOSPADM

## 2022-02-09 RX ORDER — SODIUM CHLORIDE 0.9 % (FLUSH) 0.9 %
5-10 SYRINGE (ML) INJECTION AS NEEDED
Status: CANCELLED | OUTPATIENT
Start: 2022-02-27

## 2022-02-09 RX ORDER — HEPARIN 100 UNIT/ML
500 SYRINGE INTRAVENOUS AS NEEDED
Status: CANCELLED | OUTPATIENT
Start: 2022-02-27

## 2022-02-09 RX ORDER — HEPARIN 100 UNIT/ML
500 SYRINGE INTRAVENOUS AS NEEDED
Status: DISCONTINUED | OUTPATIENT
Start: 2022-02-09 | End: 2022-02-10 | Stop reason: HOSPADM

## 2022-02-09 RX ORDER — SODIUM CHLORIDE 9 MG/ML
10 INJECTION INTRAMUSCULAR; INTRAVENOUS; SUBCUTANEOUS AS NEEDED
Status: DISCONTINUED | OUTPATIENT
Start: 2022-02-09 | End: 2022-02-10 | Stop reason: HOSPADM

## 2022-02-09 RX ADMIN — SODIUM CHLORIDE, PRESERVATIVE FREE 10 ML: 5 INJECTION INTRAVENOUS at 15:08

## 2022-02-09 RX ADMIN — Medication 500 UNITS: at 15:08

## 2022-02-09 NOTE — PROGRESS NOTES
[facility] OPIC Progress Note    Date: 2022    Name: Yani Armijo    MRN: [id number]         : 1970    Monthly Port flush     Ms. Eloina Garcia was assessed and education was provided. Ms. Pham's vitals were reviewed and patient was observed for 5 minutes prior to procedure. Visit Vitals  /73 (BP 1 Location: Left upper arm, BP Patient Position: Sitting)   Pulse 71   Temp 97.9 °F (36.6 °C)   Resp 16   Wt 69.8 kg (153 lb 12.8 oz)   BMI 25.59 kg/m²       Mediport was accessed with 20 gauge, 1 inch florez after chloroprep.    right chest, single  Blood return: YES    Flushed 10 ml of NS followed by Heparin 500u    Florez needle removed and site intact. Band-Aid applied. Ms. Eloina Garcia tolerated the procedure, and had no complaints. Ms. Eloina Garcia was discharged from [place] [address] in stable condition at 1510. She is to return on  at 3 pm for her next appointment.     Melinda Lopez RN  2022  3:33 PM

## 2022-02-10 ENCOUNTER — CLINICAL SUPPORT (OUTPATIENT)
Dept: FAMILY MEDICINE CLINIC | Age: 52
End: 2022-02-10

## 2022-02-10 VITALS — TEMPERATURE: 98.1 F

## 2022-02-10 DIAGNOSIS — D32.0 BENIGN MENINGIOMA OF BRAIN (HCC): Primary | ICD-10-CM

## 2022-03-11 ENCOUNTER — HOSPITAL ENCOUNTER (OUTPATIENT)
Dept: INFUSION THERAPY | Age: 52
Discharge: HOME | End: 2022-03-11
Attending: INTERNAL MEDICINE
Payer: COMMERCIAL

## 2022-03-11 ENCOUNTER — OFFICE VISIT (OUTPATIENT)
Dept: FAMILY MEDICINE CLINIC | Age: 52
End: 2022-03-11
Payer: COMMERCIAL

## 2022-03-11 VITALS
HEART RATE: 89 BPM | WEIGHT: 151.8 LBS | HEIGHT: 65 IN | OXYGEN SATURATION: 98 % | RESPIRATION RATE: 20 BRPM | BODY MASS INDEX: 25.29 KG/M2 | DIASTOLIC BLOOD PRESSURE: 78 MMHG | SYSTOLIC BLOOD PRESSURE: 120 MMHG | TEMPERATURE: 97.8 F

## 2022-03-11 VITALS
RESPIRATION RATE: 17 BRPM | OXYGEN SATURATION: 96 % | HEART RATE: 77 BPM | DIASTOLIC BLOOD PRESSURE: 71 MMHG | SYSTOLIC BLOOD PRESSURE: 112 MMHG | BODY MASS INDEX: 25.6 KG/M2 | WEIGHT: 153.66 LBS | HEIGHT: 65 IN | TEMPERATURE: 98.4 F

## 2022-03-11 DIAGNOSIS — Z00.00 WELLNESS EXAMINATION: Primary | ICD-10-CM

## 2022-03-11 DIAGNOSIS — E78.5 HYPERLIPIDEMIA, UNSPECIFIED HYPERLIPIDEMIA TYPE: ICD-10-CM

## 2022-03-11 DIAGNOSIS — Z01.818 PRE-OP EVALUATION: ICD-10-CM

## 2022-03-11 DIAGNOSIS — D32.0 BENIGN MENINGIOMA OF BRAIN (HCC): ICD-10-CM

## 2022-03-11 LAB
ALBUMIN SERPL-MCNC: 3.9 G/DL (ref 3.5–5)
ALBUMIN/GLOB SERPL: 1.3 {RATIO} (ref 1.1–2.2)
ALP SERPL-CCNC: 75 U/L (ref 45–117)
ALT SERPL-CCNC: 16 U/L (ref 12–78)
ANION GAP SERPL CALC-SCNC: 9 MMOL/L (ref 5–15)
AST SERPL-CCNC: 11 U/L (ref 15–37)
BASOPHILS # BLD: 0.1 K/UL (ref 0–0.1)
BASOPHILS NFR BLD: 1 % (ref 0–1)
BILIRUB SERPL-MCNC: 0.3 MG/DL (ref 0.2–1)
BUN SERPL-MCNC: 15 MG/DL (ref 6–20)
BUN/CREAT SERPL: 17 (ref 12–20)
CALCIUM SERPL-MCNC: 9.2 MG/DL (ref 8.5–10.1)
CHLORIDE SERPL-SCNC: 103 MMOL/L (ref 97–108)
CHOLEST SERPL-MCNC: 154 MG/DL
CO2 SERPL-SCNC: 30 MMOL/L (ref 21–32)
CREAT SERPL-MCNC: 0.89 MG/DL (ref 0.55–1.02)
DIFFERENTIAL METHOD BLD: NORMAL
EOSINOPHIL # BLD: 0.1 K/UL (ref 0–0.4)
EOSINOPHIL NFR BLD: 2 % (ref 0–7)
ERYTHROCYTE [DISTWIDTH] IN BLOOD BY AUTOMATED COUNT: 13 % (ref 11.5–14.5)
GLOBULIN SER CALC-MCNC: 3.1 G/DL (ref 2–4)
GLUCOSE SERPL-MCNC: 134 MG/DL (ref 65–100)
HCT VFR BLD AUTO: 35.3 % (ref 35–47)
HDLC SERPL-MCNC: 52 MG/DL
HDLC SERPL: 3 {RATIO} (ref 0–5)
HGB BLD-MCNC: 11.6 G/DL (ref 11.5–16)
IMM GRANULOCYTES # BLD AUTO: 0 K/UL (ref 0–0.04)
IMM GRANULOCYTES NFR BLD AUTO: 0 % (ref 0–0.5)
LDLC SERPL CALC-MCNC: 64 MG/DL (ref 0–100)
LYMPHOCYTES # BLD: 1.5 K/UL (ref 0.8–3.5)
LYMPHOCYTES NFR BLD: 26 % (ref 12–49)
MCH RBC QN AUTO: 29.6 PG (ref 26–34)
MCHC RBC AUTO-ENTMCNC: 32.9 G/DL (ref 30–36.5)
MCV RBC AUTO: 90.1 FL (ref 80–99)
MONOCYTES # BLD: 0.4 K/UL (ref 0–1)
MONOCYTES NFR BLD: 6 % (ref 5–13)
NEUTS SEG # BLD: 3.9 K/UL (ref 1.8–8)
NEUTS SEG NFR BLD: 65 % (ref 32–75)
NRBC # BLD: 0 K/UL (ref 0–0.01)
NRBC BLD-RTO: 0 PER 100 WBC
PLATELET # BLD AUTO: 280 K/UL (ref 150–400)
PMV BLD AUTO: 9.3 FL (ref 8.9–12.9)
POTASSIUM SERPL-SCNC: 3.6 MMOL/L (ref 3.5–5.1)
PROT SERPL-MCNC: 7 G/DL (ref 6.4–8.2)
RBC # BLD AUTO: 3.92 M/UL (ref 3.8–5.2)
SODIUM SERPL-SCNC: 142 MMOL/L (ref 136–145)
TRIGL SERPL-MCNC: 190 MG/DL (ref ?–150)
TSH SERPL DL<=0.05 MIU/L-ACNC: 1.96 UIU/ML (ref 0.36–3.74)
VLDLC SERPL CALC-MCNC: 38 MG/DL
WBC # BLD AUTO: 5.9 K/UL (ref 3.6–11)

## 2022-03-11 PROCEDURE — 99396 PREV VISIT EST AGE 40-64: CPT | Performed by: NURSE PRACTITIONER

## 2022-03-11 PROCEDURE — 82533 TOTAL CORTISOL: CPT

## 2022-03-11 PROCEDURE — 80061 LIPID PANEL: CPT

## 2022-03-11 PROCEDURE — 74011250636 HC RX REV CODE- 250/636: Performed by: NURSE PRACTITIONER

## 2022-03-11 PROCEDURE — 80053 COMPREHEN METABOLIC PANEL: CPT

## 2022-03-11 PROCEDURE — 84439 ASSAY OF FREE THYROXINE: CPT

## 2022-03-11 PROCEDURE — 93000 ELECTROCARDIOGRAM COMPLETE: CPT | Performed by: NURSE PRACTITIONER

## 2022-03-11 PROCEDURE — 96523 IRRIG DRUG DELIVERY DEVICE: CPT

## 2022-03-11 PROCEDURE — 36415 COLL VENOUS BLD VENIPUNCTURE: CPT

## 2022-03-11 PROCEDURE — 77030012965 HC NDL HUBR BBMI -A

## 2022-03-11 PROCEDURE — 74011000250 HC RX REV CODE- 250: Performed by: NURSE PRACTITIONER

## 2022-03-11 PROCEDURE — 85025 COMPLETE CBC W/AUTO DIFF WBC: CPT

## 2022-03-11 PROCEDURE — 84443 ASSAY THYROID STIM HORMONE: CPT

## 2022-03-11 RX ORDER — SODIUM CHLORIDE 9 MG/ML
10 INJECTION INTRAMUSCULAR; INTRAVENOUS; SUBCUTANEOUS AS NEEDED
Status: DISCONTINUED | OUTPATIENT
Start: 2022-03-11 | End: 2022-03-12 | Stop reason: HOSPADM

## 2022-03-11 RX ORDER — HEPARIN 100 UNIT/ML
500 SYRINGE INTRAVENOUS AS NEEDED
Status: DISCONTINUED | OUTPATIENT
Start: 2022-03-11 | End: 2022-03-12 | Stop reason: HOSPADM

## 2022-03-11 RX ADMIN — SODIUM CHLORIDE, PRESERVATIVE FREE 10 ML: 5 INJECTION INTRAVENOUS at 15:28

## 2022-03-11 RX ADMIN — Medication 500 UNITS: at 15:28

## 2022-03-11 NOTE — PROGRESS NOTES
1. Have you been to the ER, urgent care clinic since your last visit? Hospitalized since your last visit? No    2. Have you seen or consulted any other health care providers outside of the [address] [place] [address] since your last visit? Include any pap smears or colon screening.  No    Sandostatin LAR Depot injection given in L hip per Sofie Briggs R#[id number] Exp. 85-

## 2022-03-11 NOTE — PROGRESS NOTES
Chief Complaint   Patient presents with    Pre-op Exam       HPI:    Jorje Graham is a 45 y.o. female in today with her  for a pre-op physical.     Benign brain meningioma: Under the care of Dr. Eulalia Bermudez, Dr. David Nascimento at [facility], follows Q6 months. Underwent left retrosignoid craniotomy for resection of mass in June 2010.  Recurrence of disease in middle cranial fossa and displacing left temporal lobe.  Had a left pterional craniotomy for resection of the mass in December 2019.  After this, patient had hemorrhage in the resection bed, and was taken to operating room for repeat carniotomy for evacuation of the hematoma. She remains on Lamictal for seizure activity, none recently. She completed proton therapy with Dr. Willy Boeck in [facility]. Her most recent MRI showed the meningioma is stable:    \"2. Grossly stable large left tentorial meningioma with involvement of the cavernous sinus, Meckel's cave, superior orbital fissure, ambient and prepontine cistern. 3. Redemonstrated left pterional craniotomy and underlying encephalomalacia. 4. Stable small right frontal convexity meningioma. 5. Changes related to prior left retromastoid craniotomy. 6. Ongoing opacification of the left mastoid air cells. 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 She had a tube placed in her L ear and is draining thin clear fluid. New issues: In today for a pre op. She is scheduled for an inferior oblique myectomy left eye with medial rectus recession left eye with Dr. Lizzeth Sun on 4/14/22 at [facility]. She is doing well, quite stable. She has never had any adverse reactions to anesthesia, no blood transfusions, no hx of blood clots.      Allergies   Allergen Reactions    Pcn [Penicillins] Hives       Current Outpatient Medications   Medication Sig    rosuvastatin (CRESTOR) 10 mg tablet TAKE 1 TABLET BY MOUTH EVERY EVENING    PARoxetine (PAXIL) 10 mg tablet TK 1 T PO D    lamoTRIgine (LaMICtal) 200 mg tablet TK 1 T PO BID    SANDOSTATIN LAR DEPOT 30 mg serr injection by Injection route every thirty (30) days. No current facility-administered medications for this visit. Past Medical History:   Diagnosis Date    Brain tumor (benign) ([facility] [address] [facility] 2010    meningioma    Contact dermatitis and other eczema, due to unspecified cause 2015    morphoea    Headache     brain tumor/ medication    Leukemia ([facility] [facility] 5    age 4-4 years old    Menopause     Seizures ([facility] [address] [facility]        Family History   Problem Relation Age of Onset    Asthma Mother     Heart Disease Paternal Uncle     Heart Disease Paternal Grandfather        ROS:  Denies fever, chills, cough, chest pain, SOB,  nausea, vomiting, diarrhea, dysuria. Denies rashes, wounds, arthralgias, weakness, numbness, visual changes, depression. Denies wt loss, wt gain, hemoptysis, hematochezia or melena. Patient is not experiencing chest pain radiating to the jaw and/or down the arms. Physical Examination:    /78 (BP 1 Location: Right arm, BP Patient Position: Sitting, BP Cuff Size: Adult)   Pulse 89   Temp 97.8 °F (36.6 °C) (Temporal)   Resp 20   Ht 5' 5\" (1.651 m)   Wt 151 lb 12.8 oz (68.9 kg)   LMP 07/04/2015   SpO2 98%   BMI 25.26 kg/m²     Wt Readings from Last 3 Encounters:   03/11/22 151 lb 12.8 oz (68.9 kg)   02/09/22 153 lb 12.8 oz (69.8 kg)   01/04/22 151 lb (68.5 kg)     Constitutional: WDWN Female in no acute distress  HENT:  NC/AT, TMs pearly gray, t-tube patent in L ear draining clear fluid, OP: clear  EYES: L eye patched. R PERRL  Neck:  Supple, no JVD, mass or bruit. No thyromegaly. Respiratory:  Respirations even and unlabored without use of accessory muscles, CTA throughout without wheezes, rales, rubs or rhonchi. Symmetrical chest expansion. Cardiac: RRR no clicks, murmurs, gallops, or rubs  Musculoskeletal:  No cyanosis, clubbing or edema of extremities. Moves all extremities without difficulty.    Neurologic:  Smooth, even gait without assistance, CN 2-12 grossly intact. Skin: intact and warm to the touch, no rash   Lymphadenopathy: no cervical or supraclavicular nodes  Psych: Pleasant and appropriate. Judgment normal. Alert and oriented x 3. EKG: normal EKG, normal sinus rhythm, unchanged from previous tracings. ASSESSMENT AND PLAN:       ICD-10-CM ICD-9-CM    1. Wellness examination  Z00.00 V70.0    2. Pre-op evaluation  Z01.818 V72.84 AMB POC EKG ROUTINE W/ 12 LEADS, INTER & REP   3. Benign meningioma of brain (HCC)  D32.0 225.2 COLLECTION VENOUS BLOOD,VENIPUNCTURE      CBC WITH AUTOMATED DIFF      LIPID PANEL      METABOLIC PANEL, COMPREHENSIVE      T4, FREE      TSH 3RD GENERATION      CORTISOL      CORTISOL      TSH 3RD GENERATION      T4, FREE      METABOLIC PANEL, COMPREHENSIVE      LIPID PANEL      CBC WITH AUTOMATED DIFF      COLLECTION VENOUS BLOOD,VENIPUNCTURE   4. Hyperlipidemia, unspecified hyperlipidemia type  E78.5 272.4 COLLECTION VENOUS BLOOD,VENIPUNCTURE      CBC WITH AUTOMATED DIFF      LIPID PANEL      METABOLIC PANEL, COMPREHENSIVE      METABOLIC PANEL, COMPREHENSIVE      LIPID PANEL      CBC WITH AUTOMATED DIFF      COLLECTION VENOUS BLOOD,VENIPUNCTURE       HLD stable on Crestor. Reviewed HM and discussed getting screening colonoscopy this summer. Chronic labs as above. Pre op EKG done. Form completed. Medication Side Effects and Warnings were discussed with patient:  NA  Patient Labs were reviewed:  yes  Patient Past Records were reviewed:  yes    Patient aware of plan of care and verbalized understanding. Questions answered. RTC 6 months, or sooner if needed.     Malaika Cadet, LOUISA

## 2022-03-11 NOTE — PROGRESS NOTES
[facility] OPIC Progress Note    Date: 2022    Name: Anola Schaumann    MRN: [id number]         : 1970    Monthly Port flush     Ms. Margaret Carroll was assessed and education was provided. Ms. Pham's vitals were reviewed and patient was observed for 5 minutes prior to procedure. Visit Vitals  /71 (BP 1 Location: Right arm, BP Patient Position: Sitting)   Pulse 77   Temp 98.4 °F (36.9 °C)   Resp 17   Ht 5' 5\" (1.651 m)   Wt 69.7 kg (153 lb 10.6 oz)   SpO2 96%   Breastfeeding No   BMI 25.57 kg/m²       Mediport was accessed with 20 gauge, short florez after chloroprep.    right chest, single    Blood return: YES    10 of blood collected for labs per protocol. Flushed 10 of NS followed by Heparin 500u    Florez needle removed. Band-Aid applied to site without redness, swelling or pain. Ms. Margaret Carroll tolerated the procedure, and had no complaints. Ms. Margaret Carroll was discharged from [place] [address] in stable condition at 1536. She is to return on  at 1500 for her next appointment.     Caro Carias RN  2022  3:38 PM

## 2022-03-12 LAB
CORTIS SERPL-MCNC: 6.4 UG/DL
T4 FREE SERPL-MCNC: 0.7 NG/DL (ref 0.8–1.5)

## 2022-03-14 ENCOUNTER — NURSE TRIAGE (OUTPATIENT)
Dept: OTHER | Facility: CLINIC | Age: 52
End: 2022-03-14

## 2022-03-14 ENCOUNTER — VIRTUAL VISIT (OUTPATIENT)
Dept: FAMILY MEDICINE CLINIC | Age: 52
End: 2022-03-14
Payer: COMMERCIAL

## 2022-03-14 DIAGNOSIS — H10.9 CONJUNCTIVITIS OF RIGHT EYE, UNSPECIFIED CONJUNCTIVITIS TYPE: Primary | ICD-10-CM

## 2022-03-14 PROCEDURE — 99214 OFFICE O/P EST MOD 30 MIN: CPT | Performed by: NURSE PRACTITIONER

## 2022-03-14 RX ORDER — POLYMYXIN B SULFATE AND TRIMETHOPRIM 1; 10000 MG/ML; [USP'U]/ML
1 SOLUTION OPHTHALMIC
Qty: 1 EACH | Refills: 0 | Status: SHIPPED | OUTPATIENT
Start: 2022-03-14 | End: 2022-03-24

## 2022-03-14 NOTE — TELEPHONE ENCOUNTER
Received call from En Radford at [facility] with Red Flag Complaint. limited triage due to caller not with patient     Subjective: Caller spouse Tim Marinelli states \"eye swollen shut in morning. yes pink kind of blur  Earlier last week cold symptoms. Seemed to be getting better. Recently tube into left ear lot of driange more than usual. Yesterday warm compress. Right \"     Current Symptoms: pt's right eye swollen shut mostly. In the morning is crusted shut has to clean it off. No eye pain. Pt is having blurry vision related to drainage , clear per spouse report. Pt spouse reporting no itch. But whites of eyes are red-pinkish. Pt spouse denies pt having any trouble breathing or swallowing. Spouse reports patient having sinus pain/pressure     Onset: yesterday am     Associated Symptoms: NA    Pain Severity: no pain    Temperature: no fever     What has been tried: warm compress     LMP: spouse reports she doesnt have them anymore Pregnant: No    Recommended disposition: See HCP within 4 Hours (or PCP triage)    Care advice provided, patient verbalizes understanding; denies any other questions or concerns; instructed to call back for any new or worsening symptoms. Patient/Caller agrees with recommended disposition; writer provided warm transfer to [facility] at [facility] for appointment scheduling    Attention Provider: Thank you for allowing me to participate in the care of your patient. The patient was connected to triage in response to information provided to the [facility]. Please do not respond through this encounter as the response is not directed to a shared pool.       Reason for Disposition   [1] SEVERE eyelid swelling on one side AND [2] sinus pain or pressure    Protocols used: EYE - [facility]

## 2022-03-14 NOTE — PROGRESS NOTES
Ricky Alejandre is a 45 y.o. female who was seen by synchronous (real-time) audio-video technology on 3/14/2022. This encounter was conducted via [employer]. me. Consent:  Ricky Alejandre, who was evaluated through a synchronous (real-time) audio-video encounter, and/or her healthcare decision maker, is aware that it is a billable service, which includes applicable co-pays, with coverage as determined by her insurance carrier. She provided verbal consent to proceed and patient identification was verified. This visit was conducted pursuant to the emergency declaration under the [facility], [address] [place] [address] authority and the [employer] General Act. A caregiver was present when appropriate. Ability to conduct physical exam was limited. The patient was located at home in a state where the provider was licensed to provide care. --Holly Mccain NP on 3/14/2022 at 9:25 AM    I was in the office while conducting this encounter. 712  Subjective:   HPI: Ricky Alejandre was seen for Tanner Ped is seen accompanied by her  Rohit Gordon for an acute visit with a CC of R eye swelling and crusting that started Saturday morning. She reports increased tearing, blurred vision. She also has L ear clear fluid drainage as she is s/p tube placement by ENT. Allergies   Allergen Reactions    Pcn [Penicillins] Hives       Current Outpatient Medications   Medication Sig    trimethoprim-polymyxin b (POLYTRIM) ophthalmic solution Administer 1 Drop to right eye every three (3) hours for 10 days.  rosuvastatin (CRESTOR) 10 mg tablet TAKE 1 TABLET BY MOUTH EVERY EVENING    PARoxetine (PAXIL) 10 mg tablet TK 1 T PO D    lamoTRIgine (LaMICtal) 200 mg tablet TK 1 T PO BID    SANDOSTATIN LAR DEPOT 30 mg serr injection by Injection route every thirty (30) days. No current facility-administered medications for this visit. Past Medical History:   Diagnosis Date    [facility]benign) [facility] 2010    meningioma    Contact dermatitis and other eczema, due to unspecified cause 2015    morphoea    Headache     brain tumor/ medication    Leukemia ([facility] 5    age 4-4 years old    Menopause     [facility]        Family History   Problem Relation Age of Onset    Asthma Mother     Heart Disease Paternal Uncle     Heart Disease Paternal Grandfather        ROS:  Denies fever, chills, cough, chest pain, SOB,  nausea, vomiting, diarrhea, dysuria. Denies rashes, wounds, arthralgias, weakness, numbness, visual changes, depression. Denies wt loss, wt gain, hemoptysis, hematochezia or melena. Patient is not experiencing chest pain radiating to the jaw and/or down the arms. PHYSICAL EXAMINATION:    Vital Signs: (As obtained by patient/caregiver at home)  Visit Vitals  LMP 07/04/2015        Constitutional: [x] Appears well-developed and well-nourished [x] No apparent distress      [] Abnormal -     Mental status: [x] Alert and awake  [x] Oriented to person/place/time [x] Able to follow commands    [] Abnormal -     Eyes:   EOM    []  Normal    [] Abnormal -   Sclera  []  Normal    [] Abnormal -          Discharge []  None visible   [] Abnormal - R upper and lower eyelids are edematous with crusty and thick, sticky drainage.  Conjunctiva injected    HENT: [x] Normocephalic, atraumatic  [] Abnormal -   [x] Mouth/Throat: Mucous membranes are moist    External Ears [x] Normal  [] Abnormal -    Neck: [x] No visualized mass [] Abnormal -     Pulmonary/Chest: [x] Respiratory effort normal   [x] No visualized signs of difficulty breathing or respiratory distress        [] Abnormal -      Musculoskeletal:   [] Normal gait with no signs of ataxia         [x] Normal range of motion of neck        [] Abnormal -     Neurological:        [x] No Facial Asymmetry (Cranial nerve 7 motor function) (limited exam due to video visit)          [x] No gaze palsy        [] Abnormal -          Skin:        [x] No significant exanthematous lesions or discoloration noted on facial skin         [] Abnormal -            Psychiatric:       [x] Normal Affect [] Abnormal -        [x] No Hallucinations    Other pertinent observable physical exam findings:-        Assessment & Plan:       ICD-10-CM ICD-9-CM    1. Conjunctivitis of right eye, unspecified conjunctivitis type  H10.9 372.30 trimethoprim-polymyxin b (POLYTRIM) ophthalmic solution       Do not rub/touch eyes, wash hands frequently with soap and water, use the antibiotic drops/ointment as directed, and follow up if no improvement. Patient aware of plan of care and verbalized understanding. Questions answered. RTC PRN. We discussed the expected course, resolution and complications of the diagnosis(es) in detail. Medication risks, benefits, costs, interactions, and alternatives were discussed as indicated. I advised her to contact the office if her condition worsens, changes or fails to improve as anticipated. She expressed understanding with the diagnosis(es) and plan. Pursuant to the emergency declaration under the [facility], [facility] waiver authority and the [employer] General Act, this Virtual  Visit was conducted, with patient's consent, to reduce the patient's risk of exposure to COVID-19 and provide continuity of care for an established patient. Services were provided through a video synchronous discussion virtually to substitute for in-person clinic visit.     Reynaldo Burrell NP

## 2022-03-14 NOTE — PROGRESS NOTES
1. Have you been to the ER, urgent care clinic since your last visit? Hospitalized since your last visit? No    2. Have you seen or consulted any other health care providers outside of the [address] [place] [address] since your last visit? Include any pap smears or colon screening. No  Fall Risk Assessment, last 12 mths 3/14/2022   Able to walk? Yes   Fall in past 12 months? 0   Do you feel unsteady? 0   Are you worried about falling 0     Abuse Screening Questionnaire 3/14/2022   Do you ever feel afraid of your partner? N   Are you in a relationship with someone who physically or mentally threatens you? N   Is it safe for you to go home?  Y     3 most recent PHQ Screens 3/14/2022   PHQ Not Done -   Little interest or pleasure in doing things Not at all   Feeling down, depressed, irritable, or hopeless Not at all   Total Score PHQ 2 0     Learning Assessment 3/14/2022   PRIMARY LEARNER Patient   HIGHEST LEVEL OF EDUCATION - PRIMARY LEARNER  SOME COLLEGE   BARRIERS PRIMARY LEARNER NONE   CO-LEARNER CAREGIVER No   PRIMARY LANGUAGE ENGLISH   LEARNER PREFERENCE PRIMARY READING   ANSWERED BY Patient   RELATIONSHIP SELF

## 2022-03-19 PROBLEM — D32.0 BENIGN MENINGIOMA OF BRAIN (HCC): Status: ACTIVE | Noted: 2017-04-18

## 2022-03-19 PROBLEM — E78.2 MIXED HYPERLIPIDEMIA: Status: ACTIVE | Noted: 2017-07-18

## 2022-03-22 ENCOUNTER — VIRTUAL VISIT (OUTPATIENT)
Dept: FAMILY MEDICINE CLINIC | Age: 52
End: 2022-03-22
Payer: COMMERCIAL

## 2022-03-22 DIAGNOSIS — J02.9 PHARYNGITIS, UNSPECIFIED ETIOLOGY: Primary | ICD-10-CM

## 2022-03-22 PROCEDURE — 99214 OFFICE O/P EST MOD 30 MIN: CPT | Performed by: NURSE PRACTITIONER

## 2022-03-22 RX ORDER — CLINDAMYCIN HYDROCHLORIDE 300 MG/1
300 CAPSULE ORAL 2 TIMES DAILY
Qty: 14 CAPSULE | Refills: 0 | Status: SHIPPED | OUTPATIENT
Start: 2022-03-22 | End: 2022-03-29

## 2022-03-22 NOTE — PROGRESS NOTES
Chief Complaint   Patient presents with    Cough     Mouth Pain     1. Have you been to the ER, urgent care clinic since your last visit? Hospitalized since your last visit? No    2. Have you seen or consulted any other health care providers outside of the [address] [place] [address] since your last visit? Include any pap smears or colon screening. No  Abuse Screening Questionnaire 3/22/2022   Do you ever feel afraid of your partner? N   Are you in a relationship with someone who physically or mentally threatens you? N   Is it safe for you to go home? Y     3 most recent PHQ Screens 3/22/2022   PHQ Not Done -   Little interest or pleasure in doing things Not at all   Feeling down, depressed, irritable, or hopeless Not at all   Total Score PHQ 2 0     Learning Assessment 3/22/2022   PRIMARY LEARNER Patient   HIGHEST LEVEL OF EDUCATION - PRIMARY LEARNER  SOME COLLEGE   BARRIERS PRIMARY LEARNER NONE   CO-LEARNER CAREGIVER No   PRIMARY LANGUAGE ENGLISH   LEARNER PREFERENCE PRIMARY READING   ANSWERED BY Patient   RELATIONSHIP SELF     Fall Risk Assessment, last 12 mths 3/22/2022   Able to walk? Yes   Fall in past 12 months? 0   Do you feel unsteady?  0   Are you worried about falling 0

## 2022-03-22 NOTE — PROGRESS NOTES
Mel Montgomery is a 45 y.o. female who was seen by synchronous (real-time) audio-video technology on 3/22/2022. This encounter was conducted viaDo. me. Consent:  Mel Montgomery, who was evaluated through a synchronous (real-time) audio-video encounter, and/or her healthcare decision maker, is aware that it is a billable service, which includes applicable co-pays, with coverage as determined by her insurance carrier. She provided verbal consent to proceed and patient identification was verified. This visit was conducted pursuant to the emergency declaration under the [facility], [address] [place] [address] authority and the [employer] and [employer] General Act. A caregiver was present when appropriate. Ability to conduct physical exam was limited. The patient was located at home in a state where the provider was licensed to provide care. --Salomón Comer NP on 3/22/2022 at 9:26 AM    I was in the office while conducting this encounter. 712  Subjective:   HPI: Mel Montgomery was seen for Cough (Mouth Pain)         Benign brain meningioma: Under the care of Dr. Laura Garcia, Dr. Jayashree Barrios at [facility], follows Q6 months. Underwent left retrosignoid craniotomy for resection of mass in June 2010.  Recurrence of disease in middle cranial fossa and displacing left temporal lobe.  Had a left pterional craniotomy for resection of the mass in December 2019.  After this, patient had hemorrhage in the resection bed, and was taken to operating room for repeat carniotomy for evacuation of the hematoma.  She remains on Lamictal for seizure activity, none recently. She completed proton therapy with Dr. Brina Fortune in [facility]. Her most recent MRI showed the meningioma is stable:     \"2. Grossly stable large left tentorial meningioma with involvement of the cavernous sinus, Meckel's cave, superior orbital fissure, ambient and prepontine cistern.   3. Redemonstrated left pterional craniotomy and underlying encephalomalacia. 4. Stable small right frontal convexity meningioma. 5. Changes related to prior left retromastoid craniotomy. 6. Ongoing opacification of the left mastoid air cells. 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 She had a tube placed in her L ear and is draining thin clear fluid.      New issues: Seen virtually today with a CC of a L sided sore throat. She has had URI symptoms x 10-14 days. Cough is improving but now she has a sore throat and a soreness on the inside of her mouth. She denies visible sores. Seen last week with conjunctivitis which has improved. Allergies   Allergen Reactions    Pcn [Penicillins] Hives       Current Outpatient Medications   Medication Sig    clindamycin (CLEOCIN) 300 mg capsule Take 1 Capsule by mouth two (2) times a day for 7 days.  trimethoprim-polymyxin b (POLYTRIM) ophthalmic solution Administer 1 Drop to right eye every three (3) hours for 10 days.  rosuvastatin (CRESTOR) 10 mg tablet TAKE 1 TABLET BY MOUTH EVERY EVENING    PARoxetine (PAXIL) 10 mg tablet TK 1 T PO D    lamoTRIgine (LaMICtal) 200 mg tablet TK 1 T PO BID    SANDOSTATIN LAR DEPOT 30 mg serr injection by Injection route every thirty (30) days. No current facility-administered medications for this visit. Past Medical History:   Diagnosis Date    Brain tumor (benign) ([facility] [address] 2010    meningioma    Contact dermatitis and other eczema, due to unspecified cause 2015    morphoea    Headache     brain tumor/ medication    Leukemia ([facility] 5    age 4-4 years old    Menopause     Seizures ([facility]        Family History   Problem Relation Age of Onset    Asthma Mother     Heart Disease Paternal Uncle     Heart Disease Paternal Grandfather        ROS:  Denies fever, chills, + cough, chest pain, SOB,  nausea, vomiting, diarrhea, dysuria.  Denies rashes, wounds, arthralgias, weakness, numbness, visual changes, depression. Denies wt loss, wt gain, hemoptysis, hematochezia or melena. Patient is not experiencing chest pain radiating to the jaw and/or down the arms. PHYSICAL EXAMINATION:    Vital Signs: (As obtained by patient/caregiver at home)  Visit Vitals  LMP 07/04/2015        Constitutional: [x] Appears well-developed and well-nourished [x] No apparent distress      [] Abnormal -     Mental status: [x] Alert and awake  [x] Oriented to person/place/time [x] Able to follow commands    [] Abnormal -     Eyes:   EOM    [x]  Normal    [] Abnormal -   Sclera  [x]  Normal    [] Abnormal -          Discharge [x]  None visible   [x] Abnormal - conjunctivae: 1+ injection   HENT: [x] Normocephalic, atraumatic  [] Abnormal -   [x] Mouth/Throat: Mucous membranes are moist  Unable to visualize any erythema, lesions     External Ears [x] Normal  [] Abnormal -    Neck: [x] No visualized mass [] Abnormal -     Pulmonary/Chest: [x] Respiratory effort normal   [x] No visualized signs of difficulty breathing or respiratory distress        [] Abnormal -      Musculoskeletal:   [x] Normal gait with no signs of ataxia         [x] Normal range of motion of neck        [] Abnormal -     Neurological:        [x] No Facial Asymmetry (Cranial nerve 7 motor function) (limited exam due to video visit)          [x] No gaze palsy        [] Abnormal -          Skin:        [x] No significant exanthematous lesions or discoloration noted on facial skin         [] Abnormal -            Psychiatric:       [x] Normal Affect [] Abnormal -        [x] No Hallucinations    Other pertinent observable physical exam findings:-        Assessment & Plan:       ICD-10-CM ICD-9-CM    1. Pharyngitis, unspecified etiology  J02.9 462      Prolonged URI symptoms without significant improvement. Upcoming surgery. Rx clindamycin. Come in to be seen on Friday if no improvement as this is difficult to assess virtually.       Patient aware of plan of care and verbalized understanding. Questions answered. RTC PRN. We discussed the expected course, resolution and complications of the diagnosis(es) in detail. Medication risks, benefits, costs, interactions, and alternatives were discussed as indicated. I advised her to contact the office if her condition worsens, changes or fails to improve as anticipated. She expressed understanding with the diagnosis(es) and plan. Pursuant to the emergency declaration under the [address] [place] [address], [facility] waiver authority and the [employer] and [employer] Act, this Virtual  Visit was conducted, with patient's consent, to reduce the patient's risk of exposure to COVID-19 and provide continuity of care for an established patient. Services were provided through a video synchronous discussion virtually to substitute for in-person clinic visit.     Jose Phipps, NP

## 2022-04-06 ENCOUNTER — APPOINTMENT (OUTPATIENT)
Dept: INFUSION THERAPY | Age: 52
End: 2022-04-06

## 2022-04-12 ENCOUNTER — HOSPITAL ENCOUNTER (OUTPATIENT)
Dept: INFUSION THERAPY | Age: 52
Discharge: HOME | End: 2022-04-12
Attending: INTERNAL MEDICINE
Payer: COMMERCIAL

## 2022-04-12 ENCOUNTER — CLINICAL SUPPORT (OUTPATIENT)
Dept: FAMILY MEDICINE CLINIC | Age: 52
End: 2022-04-12

## 2022-04-12 VITALS
DIASTOLIC BLOOD PRESSURE: 74 MMHG | RESPIRATION RATE: 19 BRPM | WEIGHT: 151.46 LBS | SYSTOLIC BLOOD PRESSURE: 113 MMHG | OXYGEN SATURATION: 96 % | HEART RATE: 66 BPM | TEMPERATURE: 98.2 F | BODY MASS INDEX: 25.2 KG/M2

## 2022-04-12 VITALS — TEMPERATURE: 98 F

## 2022-04-12 DIAGNOSIS — D32.0 BENIGN MENINGIOMA OF BRAIN (HCC): ICD-10-CM

## 2022-04-12 DIAGNOSIS — D32.0 BENIGN MENINGIOMA OF BRAIN (HCC): Primary | ICD-10-CM

## 2022-04-12 PROCEDURE — 77030012965 HC NDL HUBR BBMI -A

## 2022-04-12 PROCEDURE — 74011000250 HC RX REV CODE- 250: Performed by: NURSE PRACTITIONER

## 2022-04-12 PROCEDURE — 96523 IRRIG DRUG DELIVERY DEVICE: CPT

## 2022-04-12 PROCEDURE — 74011250636 HC RX REV CODE- 250/636: Performed by: NURSE PRACTITIONER

## 2022-04-12 RX ORDER — HEPARIN 100 UNIT/ML
500 SYRINGE INTRAVENOUS AS NEEDED
Status: DISCONTINUED | OUTPATIENT
Start: 2022-04-12 | End: 2022-04-13 | Stop reason: HOSPADM

## 2022-04-12 RX ORDER — SODIUM CHLORIDE 9 MG/ML
10 INJECTION INTRAMUSCULAR; INTRAVENOUS; SUBCUTANEOUS AS NEEDED
Status: DISCONTINUED | OUTPATIENT
Start: 2022-04-12 | End: 2022-04-13 | Stop reason: HOSPADM

## 2022-04-12 RX ADMIN — SODIUM CHLORIDE, PRESERVATIVE FREE 10 ML: 5 INJECTION INTRAVENOUS at 09:36

## 2022-04-12 RX ADMIN — Medication 500 UNITS: at 09:36

## 2022-04-12 NOTE — PROGRESS NOTES
\[facility] [place] [facility]\"" [facility] Progress Note    Date: 2022    Name: Theresa Benavides    MRN: [id number]         : 1970    Monthly Port flush     Ms. Dahlia Ji was assessed and education was provided. No new concerns voiced. Ms. Pham's vitals were reviewed and patient was observed for 5 minutes prior to procedure. Visit Vitals  /74 (BP 1 Location: Left arm, BP Patient Position: Sitting)   Pulse 66   Temp 98.2 °F (36.8 °C)   Resp 19   Wt 68.7 kg (151 lb 7.3 oz)   SpO2 96%   BMI 25.20 kg/m²       Mediport was accessed with 20 gauge, short florez after chloroprep.    right chest, single    Blood return: YES    Flushed 10 of NS followed by Heparin 500u    Florez needle removed. Band-Aid applied to site without redness, swelling or pain. Ms. Dahlia Ji tolerated the procedure, and had no complaints. Ms. Dahlia Ji was discharged from [place] [address] in stable condition at [address] [place], [place] [address]les Dr. She is to return on May 17 at 0900 for her next appointment.     Jomarie Rubinstein, RN  2022  9:44 AM

## 2022-04-21 ENCOUNTER — OFFICE VISIT (OUTPATIENT)
Dept: FAMILY MEDICINE CLINIC | Age: 52
End: 2022-04-21
Payer: COMMERCIAL

## 2022-04-21 VITALS
BODY MASS INDEX: 25.39 KG/M2 | WEIGHT: 152.4 LBS | TEMPERATURE: 98.7 F | HEIGHT: 65 IN | RESPIRATION RATE: 18 BRPM | SYSTOLIC BLOOD PRESSURE: 100 MMHG | HEART RATE: 84 BPM | DIASTOLIC BLOOD PRESSURE: 70 MMHG | OXYGEN SATURATION: 97 %

## 2022-04-21 DIAGNOSIS — R19.7 ACUTE DIARRHEA: Primary | ICD-10-CM

## 2022-04-21 PROCEDURE — 99214 OFFICE O/P EST MOD 30 MIN: CPT | Performed by: NURSE PRACTITIONER

## 2022-04-21 RX ORDER — METRONIDAZOLE 500 MG/1
500 TABLET ORAL 2 TIMES DAILY
Qty: 14 TABLET | Refills: 0 | Status: SHIPPED | OUTPATIENT
Start: 2022-04-21 | End: 2022-05-24 | Stop reason: ALTCHOICE

## 2022-04-21 NOTE — PROGRESS NOTES
Chief Complaint   Patient presents with    Diarrhea       HPI:    Fran Shipman is a 45 y.o. female in today with her  for an acute visit.     Benign brain meningioma: Under the care of Dr. Silvia Dunham, Dr. Rambo Ye at [facility], follows Q6 months. Underwent left retrosignoid craniotomy for resection of mass in June 2010.  Recurrence of disease in middle cranial fossa and displacing left temporal lobe.  Had a left pterional craniotomy for resection of the mass in December 2019.  Completed proton therapy with Dr. Rosana Roth in [facility]. Her most recent MRI showed the meningioma is stable. 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 She had a tube placed in her L ear and is draining thin clear fluid. New issues: In today with a CC of diarrhea that started April 9th. Patient with numerous bowel movements per day. Every time she eats, she has diarrhea. Stools are loose but not watery. Fecal incontinence x 1. No abd pain, pain with defection. She reports decreased appetite due to fear of diarrhea. S/p eye surgery on 4/14, post op follow up is tomorrow. Allergies   Allergen Reactions    Pcn [Penicillins] Hives       Current Outpatient Medications   Medication Sig    metroNIDAZOLE (FLAGYL) 500 mg tablet Take 1 Tablet by mouth two (2) times a day.  rosuvastatin (CRESTOR) 10 mg tablet TAKE 1 TABLET BY MOUTH EVERY EVENING    PARoxetine (PAXIL) 10 mg tablet TK 1 T PO D    lamoTRIgine (LaMICtal) 200 mg tablet TK 1 T PO BID    SANDOSTATIN LAR DEPOT 30 mg serr injection by Injection route every thirty (30) days. No current facility-administered medications for this visit.        Past Medical History:   Diagnosis Date    Brain tumor (benign) ([address] 2010    meningioma    Contact dermatitis and other eczema, due to unspecified cause 2015    morphoea    Headache     brain tumor/ medication    Leukemia ([address] 5    age 4-4 years old    Menopause     Seizures ([address]        Family History   Problem Relation Age of Onset    Asthma Mother     Heart Disease Paternal Uncle     Heart Disease Paternal Grandfather        ROS:  Denies fever, chills, cough, chest pain, SOB,  nausea, vomiting, + diarrhea, dysuria. Denies rashes, wounds, arthralgias, weakness, numbness, visual changes, depression. Denies wt loss, wt gain, hemoptysis, hematochezia or melena. Patient is not experiencing chest pain radiating to the jaw and/or down the arms. Physical Examination:    /70 (BP 1 Location: Left arm, BP Patient Position: Sitting, BP Cuff Size: Adult long)   Pulse 84   Temp 98.7 °F (37.1 °C) (Temporal)   Resp 18   Ht 5' 5\" (1.651 m)   Wt 152 lb 6.4 oz (69.1 kg)   LMP 07/04/2015   SpO2 97%   BMI 25.36 kg/m²     Wt Readings from Last 3 Encounters:   04/21/22 152 lb 6.4 oz (69.1 kg)   04/12/22 151 lb 7.3 oz (68.7 kg)   03/11/22 153 lb 10.6 oz (69.7 kg)     Constitutional: WDWN Female in no acute distress  HENT:  NC/AT,   Neck:  Supple, no JVD, mass or bruit. No thyromegaly. Respiratory:  Respirations even and unlabored without use of accessory muscles, CTA throughout without wheezes, rales, rubs or rhonchi. Symmetrical chest expansion. Cardiac: RRR no clicks, murmurs, gallops, or rubs  Abd; Hyperactive BS, soft, non-tender, no HSM  Musculoskeletal:  No cyanosis, clubbing or edema of extremities. Moves all extremities without difficulty. Neurologic:  Smooth, even gait without assistance, CN 2-12 grossly intact. Skin: intact and warm to the touch, no rash   Lymphadenopathy: no cervical or supraclavicular nodes  Psych: Pleasant and appropriate. Judgment normal. Alert and oriented x 3. ASSESSMENT AND PLAN:       ICD-10-CM ICD-9-CM    1. Acute diarrhea  R19.7 787.91 OVA & PARASITES, STOOL      C DIFFICILE TOXIN A & B BY EIA      CALPROTECTIN, FECAL      AMB POC FECAL BLOOD, OCCULT, QL 1 CARD      WBC, STOOL      ENTERIC BACTERIA PANEL, DNA      C DIFFICILE TOXIN A & B BY EIA     Stool studies as above. Discussed BRAT diet, bland diet. Encouraged fluid replacement with Gatorade. Start antibiotic after collecting stool studies. Avoid anti-diarrheals for the time being. Medication Side Effects and Warnings were discussed with patient:  NA  Patient Labs were reviewed:  yes  Patient Past Records were reviewed:  yes    Patient aware of plan of care and verbalized understanding. Questions answered. RTC PRN.     Karyle Copier, NP

## 2022-04-21 NOTE — PATIENT INSTRUCTIONS
Diarrhea: Care Instructions  Overview     Diarrhea is loose, watery stools (bowel movements). The exact cause is often hard to find. Sometimes diarrhea is your body's way of getting rid of what caused an upset stomach. Viruses, food poisoning, and many medicines can cause diarrhea. Some people get diarrhea in response to emotional stress, anxiety, or certain foods. Almost everyone has diarrhea now and then. It usually isn't serious, and your stools will return to normal soon. The important thing to do is replace the fluids you have lost, so you can prevent dehydration. The doctor has checked you carefully, but problems can develop later. If you notice any problems or new symptoms, get medical treatment right away. Follow-up care is a key part of your treatment and safety. Be sure to make and go to all appointments, and call your doctor if you are having problems. It's also a good idea to know your test results and keep a list of the medicines you take. How can you care for yourself at home? · Watch for signs of dehydration, which means your body has lost too much water. Dehydration is a serious condition and should be treated right away. Signs of dehydration are:  ? Increasing thirst and dry eyes and mouth. ? Feeling faint or lightheaded. ? A smaller amount of urine than normal.  · To prevent dehydration, drink plenty of fluids. Choose water and other clear liquids until you feel better. If you have kidney, heart, or liver disease and have to limit fluids, talk with your doctor before you increase the amount of fluids you drink. · When you feel like eating, start with small amounts of food. · The doctor may recommend that you take over-the-counter medicine, such as loperamide (Imodium). Read and follow all instructions on the label. Do not use this medicine if you have bloody diarrhea, a high fever, or other signs of serious illness.  Call your doctor if you think you are having a problem with your medicine. When should you call for help? Call 911 anytime you think you may need emergency care. For example, call if:    · You passed out (lost consciousness).     · Your stools are maroon or very bloody. Call your doctor now or seek immediate medical care if:    · You are dizzy or lightheaded, or you feel like you may faint.     · Your stools are black and look like tar, or they have streaks of blood.     · You have new or worse belly pain.     · You have symptoms of dehydration, such as:  ? Dry eyes and a dry mouth. ? Passing only a little urine. ? Cannot keep fluids down.     · You have a new or higher fever. Watch closely for changes in your health, and be sure to contact your doctor if:    · Your diarrhea is getting worse.     · You see pus in the diarrhea.     · You are not getting better after 2 days (48 hours). Where can you learn more? Go to http://www.gray.com/  Enter [id number] in the search box to learn more about \"Diarrhea: Care Instructions. \"  Current as of: July 1, 2021               Content Version: 13.2  © 6103-6441 [employer]. Care instructions adapted under license by [employer] (which disclaims liability or warranty for this information). If you have questions about a medical condition or this instruction, always ask your healthcare professional. [address] any warranty or liability for your use of this information.

## 2022-04-27 LAB — HEMOCCULT STL QL IA: POSITIVE

## 2022-04-28 NOTE — PROGRESS NOTES
Sp/w patient, gave results, she states that she is surprised with this result, she is not having any issues at this time. I advised her that all of the other tests are still pending and we will be in touch with those results once they are available.  KT

## 2022-04-28 NOTE — PROGRESS NOTES
Please call, her stool sample was positive for blood. Her other stool tests are still pending. How is she feeling?

## 2022-04-30 LAB
CAMPYLOBACTER STL CULT: NORMAL
E COLI SXT STL QL IA: NEGATIVE
SALM + SHIG STL CULT: NORMAL
SPECIMEN STATUS REPORT, ROLRST: NORMAL

## 2022-05-04 ENCOUNTER — APPOINTMENT (OUTPATIENT)
Dept: INFUSION THERAPY | Age: 52
End: 2022-05-04
Attending: INTERNAL MEDICINE

## 2022-05-04 ENCOUNTER — TELEPHONE (OUTPATIENT)
Dept: FAMILY MEDICINE CLINIC | Age: 52
End: 2022-05-04

## 2022-05-05 LAB
C DIFF TOX A+B STL QL IA: NEGATIVE
CALPROTECTIN STL-MCNT: 916 UG/G (ref 0–120)
O+P SPEC MICRO: NORMAL
WBC STL QL MICRO: NORMAL

## 2022-05-05 NOTE — PROGRESS NOTES
Patient verified by stating name and date of birth.  Patient informed of lab results and states understanding per Zoya Moreno

## 2022-05-05 NOTE — PROGRESS NOTES
More follow up stool tests have returned and they look good overall. She does have one test that indicates inflammation in her gut. I recommend we repeat the blood test and inflammation test in 1-2 months when she is feeling way to ensure resolution.

## 2022-05-05 NOTE — PROGRESS NOTES
Patient verified by stating name and date of birth. Patient informed of lab results and states understanding per Catarina English. Patient states that her stool is about the same but maybe alittle better, she will follow up in a couple months as advised.

## 2022-05-13 ENCOUNTER — CLINICAL SUPPORT (OUTPATIENT)
Dept: FAMILY MEDICINE CLINIC | Age: 52
End: 2022-05-13
Payer: COMMERCIAL

## 2022-05-13 VITALS — TEMPERATURE: 98.1 F

## 2022-05-13 DIAGNOSIS — Z92.3 HISTORY OF HEAD AND NECK RADIATION: ICD-10-CM

## 2022-05-13 DIAGNOSIS — D32.0 BENIGN MENINGIOMA OF BRAIN (HCC): Primary | ICD-10-CM

## 2022-05-13 PROCEDURE — 95115 IMMUNOTHERAPY ONE INJECTION: CPT

## 2022-05-17 ENCOUNTER — HOSPITAL ENCOUNTER (OUTPATIENT)
Dept: INFUSION THERAPY | Age: 52
Discharge: HOME | End: 2022-05-17
Attending: INTERNAL MEDICINE
Payer: COMMERCIAL

## 2022-05-17 VITALS
RESPIRATION RATE: 18 BRPM | TEMPERATURE: 97.8 F | WEIGHT: 151.8 LBS | DIASTOLIC BLOOD PRESSURE: 81 MMHG | OXYGEN SATURATION: 98 % | SYSTOLIC BLOOD PRESSURE: 121 MMHG | HEART RATE: 72 BPM | BODY MASS INDEX: 25.26 KG/M2

## 2022-05-17 PROCEDURE — 74011000250 HC RX REV CODE- 250: Performed by: INTERNAL MEDICINE

## 2022-05-17 PROCEDURE — 74011250636 HC RX REV CODE- 250/636: Performed by: INTERNAL MEDICINE

## 2022-05-17 PROCEDURE — 77030012965 HC NDL HUBR BBMI -A

## 2022-05-17 PROCEDURE — 96523 IRRIG DRUG DELIVERY DEVICE: CPT

## 2022-05-17 RX ORDER — SODIUM CHLORIDE 0.9 % (FLUSH) 0.9 %
5-10 SYRINGE (ML) INJECTION AS NEEDED
Status: DISCONTINUED | OUTPATIENT
Start: 2022-05-17 | End: 2022-05-18 | Stop reason: HOSPADM

## 2022-05-17 RX ORDER — HEPARIN 100 UNIT/ML
500 SYRINGE INTRAVENOUS AS NEEDED
Status: DISCONTINUED | OUTPATIENT
Start: 2022-05-17 | End: 2022-05-18 | Stop reason: HOSPADM

## 2022-05-17 RX ADMIN — SODIUM CHLORIDE, PRESERVATIVE FREE 10 ML: 5 INJECTION INTRAVENOUS at 15:52

## 2022-05-17 RX ADMIN — Medication 500 UNITS: at 15:53

## 2022-05-17 NOTE — PROGRESS NOTES
[facility] OPIC Progress Note    Date: May 17, 2022    Name: Hina Boles    MRN: [id number]         : 1970    Monthly Port flush     Ms. Bernard Waters was assessed and education was provided. Ms. Zuleyma Hayward vitals were reviewed. Visit Vitals  /81 (BP 1 Location: Left upper arm, BP Patient Position: Sitting)   Pulse 72   Temp 97.8 °F (36.6 °C)   Resp 18   Wt 68.9 kg (151 lb 12.8 oz)   SpO2 98%   BMI 25.26 kg/m²       Mediport was accessed with 20 gauge 1 inch florez(s) after chloroprep.    right chest, single/double    Blood return: YES    . Flushed 10 of NS followed by Heparin 500u    Florez needle(s) removed. Band-Aid applied. Ms. Bernard Waters tolerated the procedure, and had no complaints. Patient armband removed and shredded. Ms. Bernard Waters was discharged from [place] [address] in stable condition at 1550. She is to return on 22 at  for her next appointment. She will have her port flushed at [facility] in .       Peng Garza RN  May 17, 2022  3:59 PM

## 2022-05-19 ENCOUNTER — NURSE TRIAGE (OUTPATIENT)
Dept: OTHER | Facility: CLINIC | Age: 52
End: 2022-05-19

## 2022-05-19 NOTE — TELEPHONE ENCOUNTER
Received call from Karthik at [facility] with [employer]. Subjective: Caller states \"She is having bloody discharge from her ears. \"     Current Symptoms: Discharge from ear- left ear    Onset: Three or four days ago    Associated Symptoms: N/A    Pain Severity: Denies pain    Temperature: N/A    What has been tried: Tubes in ears in January- ears have been \"draining\" but have not been bloody    Limited triage due to patient not being present    Recommended disposition: See in Office Today    Care advice provided, patient verbalizes understanding; denies any other questions or concerns; instructed to call back for any new or worsening symptoms. Patient/Caller agrees with recommended disposition; writer provided warm transfer to QRGL at [facility] for appointment scheduling    Attention Provider: Thank you for allowing me to participate in the care of your patient. The patient was connected to triage in response to information provided to the ECC. Please do not respond through this encounter as the response is not directed to a shared pool.     Reason for Disposition   Unexplained bleeding    Protocols used: EAR - DISCHARGE-ADULT-OH

## 2022-05-24 ENCOUNTER — OFFICE VISIT (OUTPATIENT)
Dept: FAMILY MEDICINE CLINIC | Age: 52
End: 2022-05-24
Payer: COMMERCIAL

## 2022-05-24 VITALS
BODY MASS INDEX: 25.33 KG/M2 | DIASTOLIC BLOOD PRESSURE: 60 MMHG | WEIGHT: 152 LBS | TEMPERATURE: 98.1 F | SYSTOLIC BLOOD PRESSURE: 120 MMHG | HEIGHT: 65 IN | RESPIRATION RATE: 18 BRPM | OXYGEN SATURATION: 99 % | HEART RATE: 65 BPM

## 2022-05-24 DIAGNOSIS — R19.5 POSITIVE FECAL OCCULT BLOOD TEST: Primary | ICD-10-CM

## 2022-05-24 DIAGNOSIS — Z23 ENCOUNTER FOR IMMUNIZATION: ICD-10-CM

## 2022-05-24 DIAGNOSIS — H92.12 CHRONIC OTORRHEA OF LEFT EAR: ICD-10-CM

## 2022-05-24 PROCEDURE — 99214 OFFICE O/P EST MOD 30 MIN: CPT | Performed by: NURSE PRACTITIONER

## 2022-05-24 PROCEDURE — 90750 HZV VACC RECOMBINANT IM: CPT | Performed by: NURSE PRACTITIONER

## 2022-05-24 PROCEDURE — 90471 IMMUNIZATION ADMIN: CPT | Performed by: NURSE PRACTITIONER

## 2022-05-24 RX ORDER — CIPROFLOXACIN AND DEXAMETHASONE 3; 1 MG/ML; MG/ML
4 SUSPENSION/ DROPS AURICULAR (OTIC) 2 TIMES DAILY
Qty: 7.5 ML | Refills: 0 | Status: SHIPPED | OUTPATIENT
Start: 2022-05-24 | End: 2022-05-31

## 2022-05-24 NOTE — PROGRESS NOTES
Chief Complaint   Patient presents with    Ear Pain       HPI:    Smeaj Villegas is a 45 y.o. female in today with her  for an acute visit.     Benign brain meningioma: Under the care of Dr. Naima Danielle, Dr. France Barreto at [facility], follows Q6 months. Underwent left retrosignoid craniotomy for resection of mass in June 2010.  Recurrence of disease in middle cranial fossa and displacing left temporal lobe.  Had a left pterional craniotomy for resection of the mass in December 2019.  Completed proton therapy with Dr. Sung Ramirez in [facility]. Meningioma is stable. 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 T-tube placed on the L in January. New issues: In today to follow up on ear drainage. She typically has thin, clear fluid but for the past 9 days, the drainage has been bloody. It seems to be improving in time, no bloody drainage today. She denies pain, hearing changes. FOBT positive a few weeks ago when she had a bout of diarrhea. Needs a re-check. Lastly, she would like her Shingles vaccine. Allergies   Allergen Reactions    Pcn [Penicillins] Hives       Current Outpatient Medications   Medication Sig    ciprofloxacin-dexamethasone (CIPRODEX) 0.3-0.1 % otic suspension Administer 4 Drops in left ear two (2) times a day for 7 days.  rosuvastatin (CRESTOR) 10 mg tablet TAKE 1 TABLET BY MOUTH EVERY EVENING    PARoxetine (PAXIL) 10 mg tablet TK 1 T PO D    lamoTRIgine (LaMICtal) 200 mg tablet TK 1 T PO BID    SANDOSTATIN LAR DEPOT 30 mg serr injection by Injection route every thirty (30) days. No current facility-administered medications for this visit.        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 Denies rashes, wounds, arthralgias, weakness, numbness, visual changes, depression. Denies wt loss, wt gain, hemoptysis, hematochezia or melena. Patient is not experiencing chest pain radiating to the jaw and/or down the arms. Physical Examination:    /60 (BP 1 Location: Left arm, BP Patient Position: Sitting, BP Cuff Size: Adult long)   Pulse 65   Temp 98.1 °F (36.7 °C) (Temporal)   Resp 18   Ht 5' 5\" (1.651 m)   Wt 152 lb (68.9 kg)   LMP 07/04/2015   SpO2 99%   BMI 25.29 kg/m²     Wt Readings from Last 3 Encounters:   05/24/22 152 lb (68.9 kg)   05/17/22 151 lb 12.8 oz (68.9 kg)   04/21/22 152 lb 6.4 oz (69.1 kg)     Constitutional: WDWN Female in no acute distress  HENT:  NC/AT, L TM T tube patent, no visible drainage. Mild erythema to ear drum just posterior to T tube without visible blood. R TM pearly gray. Neck:  Supple, no JVD, mass or bruit. No thyromegaly. Respiratory:  Respirations even and unlabored without use of accessory muscles, CTA throughout without wheezes, rales, rubs or rhonchi. Symmetrical chest expansion. Cardiac: RRR no clicks, murmurs, gallops, or rubs  Musculoskeletal:  No cyanosis, clubbing or edema of extremities. Moves all extremities without difficulty. Neurologic:  Smooth, even gait without assistance, CN 2-12 grossly intact. Skin: intact and warm to the touch, no rash   Lymphadenopathy: no cervical or supraclavicular nodes  Psych: Pleasant and appropriate. Judgment normal. Alert and oriented x 3. ASSESSMENT AND PLAN:       ICD-10-CM ICD-9-CM    1. Chronic otorrhea of left ear  H92.12 388.60 ciprofloxacin-dexamethasone (CIPRODEX) 0.3-0.1 % otic suspension   2.  Encounter for immunization  Z23 V03.89 ND IMMUNIZ ADMIN,1 SINGLE/COMB VAC/TOXOID      ZOSTER VACC RECOMBINANT ADJUVANTED   3. Positive fecal occult blood test  R19.5 792.1      Symptoms have largely resolved. Monitor. Afebrile, no pain, no hearing changes. If symptoms return, use Ciprodex BID and see ENT for follow up. Shingrix IM today. FOBT provided to be completed at home and returned to the office. Medication Side Effects and Warnings were discussed with patient:  NA  Patient Labs were reviewed:  yes  Patient Past Records were reviewed:  yes    Patient aware of plan of care and verbalized understanding. Questions answered. RTC PRN.     Yaya Morales NP

## 2022-05-24 NOTE — PROGRESS NOTES
1. \"Have you been to the ER, urgent care clinic since your last visit? Hospitalized since your last visit? \" No    2. \"Have you seen or consulted any other health care providers outside of the [address] [place] [address] since your last visit? \" No     3. For patients aged 39-70: Has the patient had a colonoscopy / FIT/ Cologuard? Yes - Care Gap present. Most recent result on file      If the patient is female:    4. For patients aged 41-77: Has the patient had a mammogram within the past 2 years? Yes - Care Gap present. Most recent result on file      5. For patients aged 21-65: Has the patient had a pap smear? Yes - Care Gap present.  Most recent result on file

## 2022-05-28 LAB — HEMOCCULT STL QL IA: NEGATIVE

## 2022-05-31 NOTE — PROGRESS NOTES
Patient verified by stating name and date of birth.  Patient informed of lab results and states understanding per Mara Pal

## 2022-06-13 ENCOUNTER — CLINICAL SUPPORT (OUTPATIENT)
Dept: FAMILY MEDICINE CLINIC | Age: 52
End: 2022-06-13

## 2022-06-13 DIAGNOSIS — D32.0 BENIGN MENINGIOMA OF BRAIN (HCC): Primary | ICD-10-CM

## 2022-07-13 ENCOUNTER — CLINICAL SUPPORT (OUTPATIENT)
Dept: FAMILY MEDICINE CLINIC | Age: 52
End: 2022-07-13

## 2022-07-13 VITALS — TEMPERATURE: 97 F

## 2022-07-13 DIAGNOSIS — D32.0 BENIGN MENINGIOMA OF BRAIN (HCC): Primary | ICD-10-CM

## 2022-07-13 NOTE — PROGRESS NOTES
Patient was administered Sandostatin 30 mg lot # [id number] 7- via IM injection in rt gluteus  . Patient tolerated medication noted side effects. Medication information reviewed with patient, patient states understanding. Patient to resume routine medications at home. Patient given copy of AVS with medication information and instructions for home. AVS reviewed with patient and patient states understanding.

## 2022-07-14 ENCOUNTER — TELEPHONE (OUTPATIENT)
Dept: FAMILY MEDICINE CLINIC | Age: 52
End: 2022-07-14

## 2022-07-14 DIAGNOSIS — Z12.11 ENCOUNTER FOR SCREENING COLONOSCOPY: Primary | ICD-10-CM

## 2022-07-19 ENCOUNTER — HOSPITAL ENCOUNTER (OUTPATIENT)
Dept: INFUSION THERAPY | Age: 52
Discharge: HOME | End: 2022-07-19
Payer: COMMERCIAL

## 2022-07-19 VITALS
DIASTOLIC BLOOD PRESSURE: 74 MMHG | SYSTOLIC BLOOD PRESSURE: 109 MMHG | HEART RATE: 81 BPM | WEIGHT: 153 LBS | TEMPERATURE: 98 F | RESPIRATION RATE: 18 BRPM | OXYGEN SATURATION: 98 % | BODY MASS INDEX: 25.46 KG/M2

## 2022-07-19 PROCEDURE — 74011250636 HC RX REV CODE- 250/636

## 2022-07-19 PROCEDURE — 96523 IRRIG DRUG DELIVERY DEVICE: CPT

## 2022-07-19 PROCEDURE — 74011000250 HC RX REV CODE- 250

## 2022-07-19 PROCEDURE — 77030012965 HC NDL HUBR BBMI -A

## 2022-07-19 RX ORDER — SODIUM CHLORIDE 9 MG/ML
10 INJECTION INTRAMUSCULAR; INTRAVENOUS; SUBCUTANEOUS AS NEEDED
Status: DISCONTINUED | OUTPATIENT
Start: 2022-07-19 | End: 2022-07-20 | Stop reason: HOSPADM

## 2022-07-19 RX ORDER — HEPARIN 100 UNIT/ML
500 SYRINGE INTRAVENOUS AS NEEDED
Status: DISCONTINUED | OUTPATIENT
Start: 2022-07-19 | End: 2022-07-20 | Stop reason: HOSPADM

## 2022-07-19 RX ADMIN — HEPARIN 500 UNITS: 100 SYRINGE at 15:45

## 2022-07-19 RX ADMIN — SODIUM CHLORIDE, PRESERVATIVE FREE 10 ML: 5 INJECTION INTRAVENOUS at 15:45

## 2022-07-19 NOTE — PROGRESS NOTES
[facility] OPIC Progress Note    Date: 2022    Name: Christianne William    MRN: [id number]         : 1970    Monthly Port flush     Ms. Abhay Burgess was assessed and education was provided. No other concerns noted. Ms. Pham's vitals were reviewed and patient was observed for 5 minutes prior to procedure. Visit Vitals  /74 (BP 1 Location: Left arm, BP Patient Position: Sitting)   Pulse 81   Temp 98 °F (36.7 °C)   Resp 18   Wt 69.4 kg (153 lb)   SpO2 98%   BMI 25.46 kg/m²       Mediport was accessed with 20 gauge, short florez after chloroprep.    right chest, single    Blood return: YES     Flushed 10 mL of NS followed by Heparin 500u    Florez needle removed. Band-Aid applied to site without redness, swelling or pain. Ms. Abhay Burgess tolerated the procedure, and had no complaints. Ms. Abhay Burgess was discharged from [place] [address] in stable condition at 1550. She is to return on  at 1400 for her next appointment.     Pankaj Russo RN  2022  3:53 PM

## 2022-07-25 DIAGNOSIS — D32.0 BENIGN MENINGIOMA OF BRAIN (HCC): Primary | ICD-10-CM

## 2022-07-25 RX ORDER — SODIUM CHLORIDE 9 MG/ML
10 INJECTION INTRAMUSCULAR; INTRAVENOUS; SUBCUTANEOUS AS NEEDED
Status: CANCELLED | OUTPATIENT
Start: 2022-08-02

## 2022-07-25 RX ORDER — SODIUM CHLORIDE 0.9 % (FLUSH) 0.9 %
5-10 SYRINGE (ML) INJECTION AS NEEDED
Status: CANCELLED | OUTPATIENT
Start: 2022-08-02

## 2022-07-25 RX ORDER — HEPARIN 100 UNIT/ML
500 SYRINGE INTRAVENOUS AS NEEDED
Status: CANCELLED | OUTPATIENT
Start: 2022-08-02

## 2022-08-12 ENCOUNTER — CLINICAL SUPPORT (OUTPATIENT)
Dept: FAMILY MEDICINE CLINIC | Age: 52
End: 2022-08-12
Payer: COMMERCIAL

## 2022-08-12 DIAGNOSIS — D32.0 BENIGN MENINGIOMA OF BRAIN (HCC): Primary | ICD-10-CM

## 2022-08-12 PROCEDURE — 96372 THER/PROPH/DIAG INJ SC/IM: CPT | Performed by: NURSE PRACTITIONER

## 2022-08-12 NOTE — PROGRESS NOTES
Pt here for IM injection of Sandostatin LAR 30mg. Given in Rt gluteus. Pt nervous but tolerated well.  Terrance Ang     Lot# [id number]  Exp: 09/30/2024  NDC: 5413-9047-14

## 2022-08-23 ENCOUNTER — HOSPITAL ENCOUNTER (OUTPATIENT)
Dept: INFUSION THERAPY | Age: 52
Discharge: HOME | End: 2022-08-23
Payer: COMMERCIAL

## 2022-08-23 VITALS
DIASTOLIC BLOOD PRESSURE: 76 MMHG | HEART RATE: 70 BPM | RESPIRATION RATE: 16 BRPM | SYSTOLIC BLOOD PRESSURE: 123 MMHG | OXYGEN SATURATION: 97 % | WEIGHT: 155.2 LBS | BODY MASS INDEX: 25.83 KG/M2 | TEMPERATURE: 98.3 F

## 2022-08-23 DIAGNOSIS — D32.0 BENIGN MENINGIOMA OF BRAIN (HCC): Primary | ICD-10-CM

## 2022-08-23 PROCEDURE — 74011250636 HC RX REV CODE- 250/636: Performed by: NURSE PRACTITIONER

## 2022-08-23 PROCEDURE — 77030003560 HC NDL HUBR BARD -A

## 2022-08-23 PROCEDURE — 96523 IRRIG DRUG DELIVERY DEVICE: CPT

## 2022-08-23 PROCEDURE — 74011000250 HC RX REV CODE- 250: Performed by: NURSE PRACTITIONER

## 2022-08-23 RX ORDER — SODIUM CHLORIDE 0.9 % (FLUSH) 0.9 %
5-10 SYRINGE (ML) INJECTION AS NEEDED
Status: CANCELLED | OUTPATIENT
Start: 2022-09-20

## 2022-08-23 RX ORDER — SODIUM CHLORIDE 0.9 % (FLUSH) 0.9 %
5-10 SYRINGE (ML) INJECTION AS NEEDED
Status: DISCONTINUED | OUTPATIENT
Start: 2022-08-23 | End: 2022-08-24 | Stop reason: HOSPADM

## 2022-08-23 RX ORDER — HEPARIN 100 UNIT/ML
500 SYRINGE INTRAVENOUS AS NEEDED
Status: CANCELLED | OUTPATIENT
Start: 2022-09-20

## 2022-08-23 RX ORDER — HEPARIN 100 UNIT/ML
500 SYRINGE INTRAVENOUS AS NEEDED
Status: DISCONTINUED | OUTPATIENT
Start: 2022-08-23 | End: 2022-08-24 | Stop reason: HOSPADM

## 2022-08-23 RX ORDER — SODIUM CHLORIDE 9 MG/ML
10 INJECTION INTRAMUSCULAR; INTRAVENOUS; SUBCUTANEOUS AS NEEDED
Status: CANCELLED | OUTPATIENT
Start: 2022-09-20

## 2022-08-23 RX ORDER — SODIUM CHLORIDE 9 MG/ML
10 INJECTION INTRAMUSCULAR; INTRAVENOUS; SUBCUTANEOUS AS NEEDED
Status: DISCONTINUED | OUTPATIENT
Start: 2022-08-23 | End: 2022-08-24 | Stop reason: HOSPADM

## 2022-08-23 RX ADMIN — Medication 500 UNITS: at 14:43

## 2022-08-23 RX ADMIN — SODIUM CHLORIDE, PRESERVATIVE FREE 10 ML: 5 INJECTION INTRAVENOUS at 14:42

## 2022-08-23 NOTE — PROGRESS NOTES
[facility] OPIC Progress Note    Date: 2022    Name: Leah To    MRN: [id number]         : 1970    Monthly Port flush     Ms. Vashti Cason was assessed and education was provided. No other concerns noted. Ms. Pham's vitals were reviewed and patient was observed for 5 minutes prior to procedure. Visit Vitals  /76 (BP 1 Location: Left upper arm, BP Patient Position: Sitting)   Pulse 70   Resp 16   Wt 70.4 kg (155 lb 3.2 oz)   SpO2 97%   BMI 25.83 kg/m²       Mediport was accessed with 20 gauge, 1inch florez(s) after chloroprep.    right chest, single/double    Blood return: YES        Flushed 10 of NS followed by Heparin 500u    Florez needle(s) removed. Band-Aid applied. Ms. Vashti Cason tolerated the procedure, and had no complaints. Ms. Vashti Cason was discharged from [place] [address] in stable condition at [phone]. She is to return on 22 at  for her next appointment.     Radha Murphy RN  2022  2:49 PM

## 2022-09-12 ENCOUNTER — CLINICAL SUPPORT (OUTPATIENT)
Dept: FAMILY MEDICINE CLINIC | Age: 52
End: 2022-09-12
Payer: COMMERCIAL

## 2022-09-12 VITALS — TEMPERATURE: 97.6 F

## 2022-09-12 DIAGNOSIS — D32.0 BENIGN MENINGIOMA OF BRAIN (HCC): Primary | ICD-10-CM

## 2022-09-12 PROCEDURE — 96372 THER/PROPH/DIAG INJ SC/IM: CPT

## 2022-09-14 ENCOUNTER — OFFICE VISIT (OUTPATIENT)
Dept: FAMILY MEDICINE CLINIC | Age: 52
End: 2022-09-14
Payer: COMMERCIAL

## 2022-09-14 VITALS
HEART RATE: 80 BPM | BODY MASS INDEX: 25.29 KG/M2 | SYSTOLIC BLOOD PRESSURE: 110 MMHG | RESPIRATION RATE: 18 BRPM | DIASTOLIC BLOOD PRESSURE: 60 MMHG | WEIGHT: 151.8 LBS | HEIGHT: 65 IN | OXYGEN SATURATION: 97 %

## 2022-09-14 DIAGNOSIS — Z96.22 S/P MYRINGOTOMY WITH INSERTION OF TUBE: ICD-10-CM

## 2022-09-14 DIAGNOSIS — H92.12 EAR DRAINAGE, LEFT: Primary | ICD-10-CM

## 2022-09-14 PROCEDURE — 99213 OFFICE O/P EST LOW 20 MIN: CPT

## 2022-09-14 NOTE — PROGRESS NOTES
Chief Complaint   Patient presents with    Ear Drainage     Patient states that her left ear does feel right. She states that it feel blocked. Patient has a blue tube in that ear physician put it in in January. HPI:    Madeleine Pope is a 47 y.o. female who presents for an acute visit. PMH includes brain meningioma for which she underwent tumor resection and proton radiation in 2019. In January of 2022, she was noted to have left middle ear fluid collection resulting in dizziness and hearing loss; a tympanostomy tube was placed at that time with improvement in her symptoms. Since that time she has experienced intermittent drainage from that ear, but notes an increase in this drainage for the past 3 weeks; she denies headache, dizziness, ear pain, fever, chills, sore throat, sinus pain. Allergies   Allergen Reactions    Pcn [Penicillins] Hives       Current Outpatient Medications   Medication Sig    rosuvastatin (CRESTOR) 10 mg tablet TAKE 1 TABLET BY MOUTH EVERY EVENING    PARoxetine (PAXIL) 10 mg tablet TK 1 T PO D    lamoTRIgine (LaMICtal) 200 mg tablet TK 1 T PO BID    SANDOSTATIN LAR DEPOT 30 mg serr injection by Injection route every thirty (30) days. No current facility-administered medications for this visit. Past Medical History:   Diagnosis Date    [facility] [facility] (benign) [address] 2010    meningioma    Contact dermatitis and other eczema, due to unspecified cause 2015    morphoea    Headache     brain tumor/ medication    Leukemia ([facility] 5    age 4-4 years old    Menopause     Seizures ([facility]        Family History   Problem Relation Age of Onset    Asthma Mother     Heart Disease Paternal Uncle     Heart Disease Paternal Grandfather        Review of Systems   Constitutional: Negative. Negative for chills, fever and malaise/fatigue. HENT:  Positive for ear discharge. Eyes: Negative. Respiratory: Negative. Negative for cough and shortness of breath. Cardiovascular: Negative. Negative for chest pain, palpitations and leg swelling. Skin: Negative. Neurological: Negative. Negative for dizziness and headaches. /60 (BP 1 Location: Left upper arm, BP Patient Position: Sitting, BP Cuff Size: Adult)   Pulse 80   Resp 18   Ht 5' 5\" (1.651 m)   Wt 151 lb 12.8 oz (68.9 kg)   LMP 07/04/2015   SpO2 97%   BMI 25.26 kg/m²     Wt Readings from Last 3 Encounters:   09/14/22 151 lb 12.8 oz (68.9 kg)   08/23/22 155 lb 3.2 oz (70.4 kg)   07/19/22 153 lb (69.4 kg)       3 most recent PHQ Screens 9/14/2022   PHQ Not Done -   Little interest or pleasure in doing things Not at all   Feeling down, depressed, irritable, or hopeless Not at all   Total Score PHQ 2 0       Physical Exam  Constitutional:       General: She is not in acute distress. Appearance: Normal appearance. She is normal weight. HENT:      Right Ear: Tympanic membrane, ear canal and external ear normal.      Left Ear: Ear canal and external ear normal.      Ears:      Comments: Left TM with intact tympanostomy tube with small amount of clear drainage seen within tube     Mouth/Throat:      Mouth: Mucous membranes are moist.      Pharynx: Oropharynx is clear. No oropharyngeal exudate or posterior oropharyngeal erythema. Comments: Palatal jenifer  Eyes:      Extraocular Movements: Extraocular movements intact. Conjunctiva/sclera: Conjunctivae normal.      Pupils: Pupils are equal, round, and reactive to light. Cardiovascular:      Rate and Rhythm: Normal rate and regular rhythm. Pulses: Normal pulses. Heart sounds: Normal heart sounds. No murmur heard. No friction rub. No gallop. Pulmonary:      Effort: Pulmonary effort is normal.      Breath sounds: Normal breath sounds. No wheezing, rhonchi or rales. Chest:   Breasts:     Right: No supraclavicular adenopathy. Left: No supraclavicular adenopathy. Musculoskeletal:      Cervical back: Normal range of motion and neck supple. Lymphadenopathy:      Head:      Right side of head: No submandibular, preauricular, posterior auricular or occipital adenopathy. Left side of head: No submandibular, preauricular, posterior auricular or occipital adenopathy. Cervical: No cervical adenopathy. Upper Body:      Right upper body: No supraclavicular adenopathy. Left upper body: No supraclavicular adenopathy. Neurological:      Mental Status: She is alert. Mental status is at baseline. Psychiatric:         Mood and Affect: Mood normal.         Behavior: Behavior normal.         Thought Content: Thought content normal.         Judgment: Judgment normal.       ASSESSMENT AND PLAN:       ICD-10-CM ICD-9-CM    1. Ear drainage, left  H92.12 388.60       2. S/P myringotomy with insertion of tube  Z96.22 V45.89           Normal placement of tube without evidence of infection or abnormal drainage. Recommend follow-up with her ENT. Medication Side Effects and Warnings were discussed with patient:  yes  Patient Labs were reviewed:  yes  Patient Past Records were reviewed:  yes      Patient aware of plan of care and verbalized understanding. Questions answered. RTC PRN or sooner if needed. On this date 09/14/2022 I have spent 20 minutes reviewing previous notes, test results and face to face with the patient discussing the diagnosis and importance of compliance with the treatment plan as well as documenting on the day of the visit.     Michell Monsalve, NP

## 2022-09-14 NOTE — PROGRESS NOTES
Richard Vincent is a 47 y.o. female presenting for/with:    Chief Complaint   Patient presents with    Ear Drainage     Patient states that her left ear does feel right. She states that it feel blocked. Patient has a blue tube in that ear physician put it in in January. Visit Vitals  /60 (BP 1 Location: Left upper arm, BP Patient Position: Sitting, BP Cuff Size: Adult)   Pulse 80   Resp 18   Ht 5' 5\" (1.651 m)   Wt 151 lb 12.8 oz (68.9 kg)   LMP 07/04/2015   SpO2 97%   BMI 25.26 kg/m²     Pain Scale: /10  Pain Location:     1. \"Have you been to the ER, urgent care clinic since your last visit? Hospitalized since your last visit? \" No    2. \"Have you seen or consulted any other health care providers outside of the [address] [place] [address] since your last visit? \" No     3. For patients aged 39-70: Has the patient had a colonoscopy / FIT/ Cologuard? No      If the patient is female:    4. For patients aged 41-77: Has the patient had a mammogram within the past 2 years? Yes - no Care Gap present      5. For patients aged 21-65: Has the patient had a pap smear?  Yes - no Care Gap present      Symptom review:  NO  Fever   NO  Shaking chills  NO  Cough  NO  Body aches  NO  Coughing up blood  NO  Chest congestion  NO  Chest pain  NO  Shortness of breath  NO  Profound Loss of smell/taste  NO  Nausea/Vomiting   NO  Loose stool/Diarrhea  NO  any skin issues    Patient Risk Factors Reviewed as follows:  NO  have you been in Close contact with confirmed COVID19 patient   NO  History of recent travel to affected geographical areas within the past 14 days  NO  COPD  NO  Active Cancer/Leukemia/Lymphoma/Chemotherapy  NO  Oral steroid use  NO  Pregnant  NO  Diabetes Mellitus  NO  Heart disease  NO  Asthma  NO Health care worker at home  NO Health care worker  NO Is there a Pregnant Woman in the home  NO Dialysis pt in the home   NO a large number of people living in the home    Learning Assessment 5/24/2022 PRIMARY LEARNER Patient   HIGHEST LEVEL OF EDUCATION - PRIMARY LEARNER  SOME COLLEGE   BARRIERS PRIMARY LEARNER NONE   CO-LEARNER CAREGIVER No   PRIMARY LANGUAGE ENGLISH   LEARNER PREFERENCE PRIMARY READING   ANSWERED BY Patient   RELATIONSHIP SELF     Fall Risk Assessment, last 12 mths 9/14/2022   Able to walk? Yes   Fall in past 12 months? 0   Do you feel unsteady? 0   Are you worried about falling 0       3 most recent PHQ Screens 9/14/2022   PHQ Not Done -   Little interest or pleasure in doing things Not at all   Feeling down, depressed, irritable, or hopeless Not at all   Total Score PHQ 2 0     Abuse Screening Questionnaire 9/14/2022   Do you ever feel afraid of your partner? N   Are you in a relationship with someone who physically or mentally threatens you? N   Is it safe for you to go home?  Y       ADL Assessment 9/14/2022   Feeding yourself No Help Needed   Getting from bed to chair No Help Needed   Getting dressed No Help Needed   Bathing or showering No Help Needed   Walk across the room (includes cane/walker) No Help Needed   Using the telphone No Help Needed   Taking your medications No Help Needed   Preparing meals No Help Needed   Managing money (expenses/bills) No Help Needed   Moderately strenuous housework (laundry) No Help Needed   Shopping for personal items (toiletries/medicines) No Help Needed   Shopping for groceries No Help Needed   Driving Help Needed   Climbing a flight of stairs No Help Needed   Getting to places beyond walking distances No Help Needed      Advance Care Planning 6/4/2021   Patient's Healthcare Decision Maker is: Legal Next of Kin   Confirm Advance Directive None

## 2022-09-15 ENCOUNTER — HOSPITAL ENCOUNTER (OUTPATIENT)
Dept: MAMMOGRAPHY | Age: 52
Discharge: HOME | End: 2022-09-15
Payer: COMMERCIAL

## 2022-09-15 DIAGNOSIS — Z12.31 VISIT FOR SCREENING MAMMOGRAM: ICD-10-CM

## 2022-09-15 PROCEDURE — 77063 BREAST TOMOSYNTHESIS BI: CPT

## 2022-09-27 ENCOUNTER — HOSPITAL ENCOUNTER (OUTPATIENT)
Dept: INFUSION THERAPY | Age: 52
Discharge: HOME | End: 2022-09-27
Payer: COMMERCIAL

## 2022-09-27 VITALS
SYSTOLIC BLOOD PRESSURE: 129 MMHG | DIASTOLIC BLOOD PRESSURE: 74 MMHG | OXYGEN SATURATION: 99 % | RESPIRATION RATE: 16 BRPM | HEART RATE: 67 BPM | TEMPERATURE: 98 F

## 2022-09-27 DIAGNOSIS — D32.0 BENIGN MENINGIOMA OF BRAIN (HCC): Primary | ICD-10-CM

## 2022-09-27 PROCEDURE — 74011250636 HC RX REV CODE- 250/636: Performed by: NURSE PRACTITIONER

## 2022-09-27 PROCEDURE — 77030012965 HC NDL HUBR BBMI -A

## 2022-09-27 PROCEDURE — 74011000250 HC RX REV CODE- 250: Performed by: NURSE PRACTITIONER

## 2022-09-27 PROCEDURE — 96523 IRRIG DRUG DELIVERY DEVICE: CPT

## 2022-09-27 RX ORDER — SODIUM CHLORIDE 9 MG/ML
10 INJECTION INTRAMUSCULAR; INTRAVENOUS; SUBCUTANEOUS AS NEEDED
Status: DISCONTINUED | OUTPATIENT
Start: 2022-09-27 | End: 2022-09-28 | Stop reason: HOSPADM

## 2022-09-27 RX ORDER — HEPARIN 100 UNIT/ML
500 SYRINGE INTRAVENOUS AS NEEDED
Status: DISCONTINUED | OUTPATIENT
Start: 2022-09-27 | End: 2022-09-28 | Stop reason: HOSPADM

## 2022-09-27 RX ORDER — HEPARIN 100 UNIT/ML
500 SYRINGE INTRAVENOUS AS NEEDED
Status: CANCELLED | OUTPATIENT
Start: 2022-10-16

## 2022-09-27 RX ORDER — SODIUM CHLORIDE 0.9 % (FLUSH) 0.9 %
5-10 SYRINGE (ML) INJECTION AS NEEDED
Status: DISCONTINUED | OUTPATIENT
Start: 2022-09-27 | End: 2022-09-28 | Stop reason: HOSPADM

## 2022-09-27 RX ORDER — SODIUM CHLORIDE 9 MG/ML
10 INJECTION INTRAMUSCULAR; INTRAVENOUS; SUBCUTANEOUS AS NEEDED
Status: CANCELLED | OUTPATIENT
Start: 2022-10-16

## 2022-09-27 RX ORDER — SODIUM CHLORIDE 0.9 % (FLUSH) 0.9 %
5-10 SYRINGE (ML) INJECTION AS NEEDED
Status: CANCELLED | OUTPATIENT
Start: 2022-10-16

## 2022-09-27 RX ADMIN — Medication 500 UNITS: at 15:15

## 2022-09-27 RX ADMIN — SODIUM CHLORIDE, PRESERVATIVE FREE 10 ML: 5 INJECTION INTRAVENOUS at 15:15

## 2022-09-27 NOTE — PROGRESS NOTES
[facility] OPIC Progress Note    Date: 2022    Name: Sohail Sullivan    MRN: [id number]         : 1970    Monthly Port flush     Ms. Jose Angel Bean was assessed and education was provided. No other concerns noted. Ms. Pham's vitals were reviewed and patient was observed for 5 minutes prior to procedure. Visit Vitals  /74 (BP 1 Location: Left upper arm, BP Patient Position: Sitting)   Pulse 67   Temp 98 °F (36.7 °C)   Resp 16   SpO2 99%       Mediport was accessed with 20 gauge, short florez(s) after chloroprep.    right chest, single    Blood return: YES    Flushed 10 of NS followed by Heparin 500u    Florez needle(s) removed. 2x2 and paper tape applied. Ms. Jose Angel Bean tolerated the procedure, and had no complaints. Armband was removed and shredded. Ms. Jose Angel Bean was discharged from [place] [address] in stable condition at 1530. She is to return on 22 at 2:30pm for her next appointment.     Chhaya Dailey RN  2022  3:54 PM

## 2022-10-04 ENCOUNTER — OFFICE VISIT (OUTPATIENT)
Dept: FAMILY MEDICINE CLINIC | Age: 52
End: 2022-10-04
Payer: COMMERCIAL

## 2022-10-04 VITALS
OXYGEN SATURATION: 98 % | RESPIRATION RATE: 20 BRPM | TEMPERATURE: 98.1 F | WEIGHT: 152.8 LBS | SYSTOLIC BLOOD PRESSURE: 120 MMHG | BODY MASS INDEX: 25.46 KG/M2 | DIASTOLIC BLOOD PRESSURE: 70 MMHG | HEART RATE: 78 BPM | HEIGHT: 65 IN

## 2022-10-04 DIAGNOSIS — Z01.818 PRE-OP EXAM: Primary | ICD-10-CM

## 2022-10-04 DIAGNOSIS — Z23 ENCOUNTER FOR IMMUNIZATION: ICD-10-CM

## 2022-10-04 DIAGNOSIS — D32.0 BENIGN MENINGIOMA OF BRAIN (HCC): ICD-10-CM

## 2022-10-04 DIAGNOSIS — E78.5 HYPERLIPIDEMIA, UNSPECIFIED HYPERLIPIDEMIA TYPE: ICD-10-CM

## 2022-10-04 PROCEDURE — 93000 ELECTROCARDIOGRAM COMPLETE: CPT | Performed by: NURSE PRACTITIONER

## 2022-10-04 PROCEDURE — 90750 HZV VACC RECOMBINANT IM: CPT | Performed by: NURSE PRACTITIONER

## 2022-10-04 PROCEDURE — 90471 IMMUNIZATION ADMIN: CPT | Performed by: NURSE PRACTITIONER

## 2022-10-04 PROCEDURE — 99214 OFFICE O/P EST MOD 30 MIN: CPT | Performed by: NURSE PRACTITIONER

## 2022-10-04 NOTE — PROGRESS NOTES
1. \"Have you been to the ER, urgent care clinic since your last visit? Hospitalized since your last visit? \" No    2. \"Have you seen or consulted any other health care providers outside of the [address] [place] [address] since your last visit? \" NO     3. For patients aged 39-70: Has the patient had a colonoscopy / FIT/ Cologuard? Yes - Care Gap present. Most recent result on file      If the patient is female:    4. For patients aged 41-77: Has the patient had a mammogram within the past 2 years? Yes - Care Gap present. Most recent result on file      5. For patients aged 21-65: Has the patient had a pap smear? Yes - Care Gap present.  Most recent result on file

## 2022-10-04 NOTE — PROGRESS NOTES
Chief Complaint   Patient presents with    Pre-op Exam       HPI:    Leticia Ruiz is a 47 y.o. female in today with her  for a pre op visit. Benign brain meningioma: Under the care of Dr. Emilio Dyson, Dr. Ronan Ramsey at [facility], follows Q6 months. Underwent left retrosignoid craniotomy for resection of mass in June 2010. Recurrence of disease in middle cranial fossa and displacing left temporal lobe. Had a left pterional craniotomy for resection of the mass in December 2019. Completed proton therapy with Dr. Godfrey Winter in [place]. Meningioma is stable. 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 T-tube placed on the L in January 2022. New issues: She is scheduled for a L eye strabismus surgery on 10/27/22 with [facility]. This will be under general anesthesia. No hx of anesthesia reactions, blood clots, latex allergy. Allergies   Allergen Reactions    Pcn [Penicillins] Hives       Current Outpatient Medications   Medication Sig    rosuvastatin (CRESTOR) 10 mg tablet TAKE 1 TABLET BY MOUTH EVERY EVENING    PARoxetine (PAXIL) 10 mg tablet TK 1 T PO D    lamoTRIgine (LaMICtal) 200 mg tablet TK 1 T PO BID    SANDOSTATIN LAR DEPOT 30 mg serr injection by Injection route every thirty (30) days. No current facility-administered medications for this visit. Past Medical History:   Diagnosis Date    Brain tumor (benign) ([address] 2010    meningioma    Contact dermatitis and other eczema, due to unspecified cause 2015    morphoea    Headache     brain tumor/ medication    Leukemia ([address] 5    age 4-4 years old    Menopause     Seizures ([address]        Family History   Problem Relation Age of Onset    Asthma Mother     Breast Cancer Sister     Heart Disease Paternal Grandfather     Heart Disease Paternal Uncle        ROS:  Denies fever, chills, cough, chest pain, SOB,  nausea, vomiting, diarrhea, dysuria. Denies rashes, wounds, arthralgias, weakness, numbness, visual changes, depression.   Denies wt loss, wt gain, hemoptysis, hematochezia or melena. Patient is not experiencing chest pain radiating to the jaw and/or down the arms. Physical Examination:    /70 (BP 1 Location: Right arm, BP Patient Position: Sitting, BP Cuff Size: Adult)   Pulse 78   Temp 98.1 °F (36.7 °C) (Temporal)   Resp 20   Ht 5' 5\" (1.651 m)   Wt 152 lb 12.8 oz (69.3 kg)   LMP 07/04/2015   SpO2 98%   BMI 25.43 kg/m²     Wt Readings from Last 3 Encounters:   10/04/22 152 lb 12.8 oz (69.3 kg)   09/14/22 151 lb 12.8 oz (68.9 kg)   08/23/22 155 lb 3.2 oz (70.4 kg)     Constitutional: WDWN Female in no acute distress  HENT:  NC/AT, L TM T tube patent, no visible drainage. R TM pearly gray. OP: normal  Neck:  Supple, no JVD, mass or bruit. No thyromegaly. Respiratory:  Respirations even and unlabored without use of accessory muscles, CTA throughout without wheezes, rales, rubs or rhonchi. Symmetrical chest expansion. Cardiac: RRR no clicks, murmurs, gallops, or rubs  Abd: Soft, non-tender, no HSM  Musculoskeletal:  No cyanosis, clubbing or edema of extremities. Moves all extremities without difficulty. Neurologic:  Smooth, even gait without assistance, CN 2-12 grossly intact. Skin: intact and warm to the touch, no rash   Lymphadenopathy: no cervical or supraclavicular nodes  Psych: Pleasant and appropriate. Judgment normal. Alert and oriented x 3. EKG: normal EKG, normal sinus rhythm, unchanged from previous tracings. ASSESSMENT AND PLAN:       ICD-10-CM ICD-9-CM    1. Pre-op exam  Z01.818 V72.84 AMB POC EKG ROUTINE W/ 12 LEADS, INTER & REP      CBC WITH AUTOMATED DIFF      METABOLIC PANEL, COMPREHENSIVE      LIPID PANEL      TSH 3RD GENERATION      CORTISOL      XR CHEST PA LAT      CBC WITH AUTOMATED DIFF      METABOLIC PANEL, COMPREHENSIVE      LIPID PANEL      TSH 3RD GENERATION      CORTISOL      2.  Benign meningioma of brain (HCC)  D32.0 225.2 CBC WITH AUTOMATED DIFF      METABOLIC PANEL, COMPREHENSIVE LIPID PANEL      TSH 3RD GENERATION      CORTISOL      CBC WITH AUTOMATED DIFF      METABOLIC PANEL, COMPREHENSIVE      LIPID PANEL      TSH 3RD GENERATION      CORTISOL      3. Hyperlipidemia, unspecified hyperlipidemia type  E78.5 272.4 CBC WITH AUTOMATED DIFF      METABOLIC PANEL, COMPREHENSIVE      LIPID PANEL      TSH 3RD GENERATION      CBC WITH AUTOMATED DIFF      METABOLIC PANEL, COMPREHENSIVE      LIPID PANEL      TSH 3RD GENERATION      4. Encounter for immunization  Z23 V03.89 AL IMMUNIZ ADMIN,1 SINGLE/COMB VAC/TOXOID      ZOSTER, SHINGRIX, (18 YRS +), IM        Labs ordered to be drawn at [facility] from her port on Thursday. CXR also ordered to be done at [facility]. Shingrix dose #2 today. Low risk for procedure and I recommend proceeding as planned. Medication Side Effects and Warnings were discussed with patient:  NA  Patient Labs were reviewed:  yes  Patient Past Records were reviewed:  yes    Patient aware of plan of care and verbalized understanding. Questions answered. RTC PRN.     Junie Garcia NP

## 2022-10-06 ENCOUNTER — HOSPITAL ENCOUNTER (OUTPATIENT)
Dept: GENERAL RADIOLOGY | Age: 52
Discharge: HOME | End: 2022-10-06
Payer: COMMERCIAL

## 2022-10-06 ENCOUNTER — HOSPITAL ENCOUNTER (OUTPATIENT)
Dept: INFUSION THERAPY | Age: 52
Discharge: HOME | End: 2022-10-06
Payer: COMMERCIAL

## 2022-10-06 VITALS
OXYGEN SATURATION: 97 % | TEMPERATURE: 98.3 F | WEIGHT: 155.2 LBS | RESPIRATION RATE: 17 BRPM | DIASTOLIC BLOOD PRESSURE: 76 MMHG | SYSTOLIC BLOOD PRESSURE: 116 MMHG | BODY MASS INDEX: 25.83 KG/M2 | HEART RATE: 70 BPM

## 2022-10-06 DIAGNOSIS — D32.0 BENIGN MENINGIOMA OF BRAIN (HCC): Primary | ICD-10-CM

## 2022-10-06 DIAGNOSIS — Z01.818 PRE-OP EXAM: ICD-10-CM

## 2022-10-06 LAB
ALBUMIN SERPL-MCNC: 4 G/DL (ref 3.5–5)
ALBUMIN/GLOB SERPL: 1.3 {RATIO} (ref 1.1–2.2)
ALP SERPL-CCNC: 93 U/L (ref 45–117)
ALT SERPL-CCNC: 21 U/L (ref 12–78)
ANION GAP SERPL CALC-SCNC: 9 MMOL/L (ref 5–15)
AST SERPL-CCNC: 15 U/L (ref 15–37)
BASOPHILS # BLD: 0 K/UL (ref 0–0.1)
BASOPHILS NFR BLD: 1 % (ref 0–1)
BILIRUB SERPL-MCNC: 0.3 MG/DL (ref 0.2–1)
BUN SERPL-MCNC: 18 MG/DL (ref 6–20)
BUN/CREAT SERPL: 19 (ref 12–20)
CALCIUM SERPL-MCNC: 8.9 MG/DL (ref 8.5–10.1)
CHLORIDE SERPL-SCNC: 104 MMOL/L (ref 97–108)
CHOLEST SERPL-MCNC: 135 MG/DL
CO2 SERPL-SCNC: 31 MMOL/L (ref 21–32)
CREAT SERPL-MCNC: 0.93 MG/DL (ref 0.55–1.02)
DIFFERENTIAL METHOD BLD: ABNORMAL
EOSINOPHIL # BLD: 0.1 K/UL (ref 0–0.4)
EOSINOPHIL NFR BLD: 1 % (ref 0–7)
ERYTHROCYTE [DISTWIDTH] IN BLOOD BY AUTOMATED COUNT: 13.2 % (ref 11.5–14.5)
GLOBULIN SER CALC-MCNC: 3 G/DL (ref 2–4)
GLUCOSE SERPL-MCNC: 105 MG/DL (ref 65–100)
HCT VFR BLD AUTO: 33.7 % (ref 35–47)
HDLC SERPL-MCNC: 43 MG/DL
HDLC SERPL: 3.1 {RATIO} (ref 0–5)
HGB BLD-MCNC: 11.3 G/DL (ref 11.5–16)
IMM GRANULOCYTES # BLD AUTO: 0 K/UL (ref 0–0.04)
IMM GRANULOCYTES NFR BLD AUTO: 0 % (ref 0–0.5)
LDLC SERPL CALC-MCNC: 48 MG/DL (ref 0–100)
LYMPHOCYTES # BLD: 1.6 K/UL (ref 0.8–3.5)
LYMPHOCYTES NFR BLD: 32 % (ref 12–49)
MCH RBC QN AUTO: 30.5 PG (ref 26–34)
MCHC RBC AUTO-ENTMCNC: 33.5 G/DL (ref 30–36.5)
MCV RBC AUTO: 90.8 FL (ref 80–99)
MONOCYTES # BLD: 0.4 K/UL (ref 0–1)
MONOCYTES NFR BLD: 8 % (ref 5–13)
NEUTS SEG # BLD: 2.9 K/UL (ref 1.8–8)
NEUTS SEG NFR BLD: 58 % (ref 32–75)
NRBC # BLD: 0 K/UL (ref 0–0.01)
NRBC BLD-RTO: 0 PER 100 WBC
PLATELET # BLD AUTO: 293 K/UL (ref 150–400)
PMV BLD AUTO: 9.6 FL (ref 8.9–12.9)
POTASSIUM SERPL-SCNC: 3.8 MMOL/L (ref 3.5–5.1)
PROT SERPL-MCNC: 7 G/DL (ref 6.4–8.2)
RBC # BLD AUTO: 3.71 M/UL (ref 3.8–5.2)
SODIUM SERPL-SCNC: 144 MMOL/L (ref 136–145)
TRIGL SERPL-MCNC: 220 MG/DL (ref ?–150)
TSH SERPL DL<=0.05 MIU/L-ACNC: 2.99 UIU/ML (ref 0.36–3.74)
VLDLC SERPL CALC-MCNC: 44 MG/DL
WBC # BLD AUTO: 5.1 K/UL (ref 3.6–11)

## 2022-10-06 PROCEDURE — 82533 TOTAL CORTISOL: CPT

## 2022-10-06 PROCEDURE — 80053 COMPREHEN METABOLIC PANEL: CPT

## 2022-10-06 PROCEDURE — 74011000250 HC RX REV CODE- 250: Performed by: NURSE PRACTITIONER

## 2022-10-06 PROCEDURE — 36591 DRAW BLOOD OFF VENOUS DEVICE: CPT

## 2022-10-06 PROCEDURE — 71046 X-RAY EXAM CHEST 2 VIEWS: CPT

## 2022-10-06 PROCEDURE — 77030012965 HC NDL HUBR BBMI -A

## 2022-10-06 PROCEDURE — 36415 COLL VENOUS BLD VENIPUNCTURE: CPT

## 2022-10-06 PROCEDURE — 74011250636 HC RX REV CODE- 250/636: Performed by: NURSE PRACTITIONER

## 2022-10-06 PROCEDURE — 80061 LIPID PANEL: CPT

## 2022-10-06 PROCEDURE — 84443 ASSAY THYROID STIM HORMONE: CPT

## 2022-10-06 PROCEDURE — 85025 COMPLETE CBC W/AUTO DIFF WBC: CPT

## 2022-10-06 RX ORDER — SODIUM CHLORIDE 9 MG/ML
10 INJECTION INTRAMUSCULAR; INTRAVENOUS; SUBCUTANEOUS AS NEEDED
OUTPATIENT
Start: 2022-10-18

## 2022-10-06 RX ORDER — SODIUM CHLORIDE 0.9 % (FLUSH) 0.9 %
5-10 SYRINGE (ML) INJECTION AS NEEDED
OUTPATIENT
Start: 2022-10-18

## 2022-10-06 RX ORDER — HEPARIN 100 UNIT/ML
500 SYRINGE INTRAVENOUS AS NEEDED
Status: DISCONTINUED | OUTPATIENT
Start: 2022-10-06 | End: 2022-10-07 | Stop reason: HOSPADM

## 2022-10-06 RX ORDER — HEPARIN 100 UNIT/ML
500 SYRINGE INTRAVENOUS AS NEEDED
OUTPATIENT
Start: 2022-10-18

## 2022-10-06 RX ORDER — SODIUM CHLORIDE 9 MG/ML
10 INJECTION INTRAMUSCULAR; INTRAVENOUS; SUBCUTANEOUS AS NEEDED
Status: DISCONTINUED | OUTPATIENT
Start: 2022-10-06 | End: 2022-10-07 | Stop reason: HOSPADM

## 2022-10-06 RX ORDER — SODIUM CHLORIDE 0.9 % (FLUSH) 0.9 %
5-10 SYRINGE (ML) INJECTION AS NEEDED
Status: DISCONTINUED | OUTPATIENT
Start: 2022-10-06 | End: 2022-10-07 | Stop reason: HOSPADM

## 2022-10-06 RX ADMIN — Medication 500 UNITS: at 15:26

## 2022-10-06 RX ADMIN — SODIUM CHLORIDE, PRESERVATIVE FREE 10 ML: 5 INJECTION INTRAVENOUS at 15:26

## 2022-10-06 NOTE — PROGRESS NOTES
[facility] Progress Note    Date: 2022    Name: Darío Tejead    MRN: [id number]         : 1970    Monthly Port flush     Ms. Jabari Scott was assessed and education was provided. Ms. Pham's vitals were reviewed and patient was observed for 5 minutes prior to procedure. Visit Vitals  /76 (BP 1 Location: Right upper arm, BP Patient Position: Sitting)   Pulse 70   Temp 98.3 °F (36.8 °C)   Resp 17   Wt 70.4 kg (155 lb 3.2 oz)   SpO2 97%   BMI 25.83 kg/m²       Mediport was accessed with 20 gauge, short florez(s) after chloroprep.    right chest, single    Blood return: YES    15 of blood collected for labs per protocol. Flushed 10 of NS followed by Heparin 500u    Florez needle(s) removed. Band-Aid applied. Ms. Jabari Scott tolerated the procedure, and had no complaints. Ms. Jabari Scott was discharged from [place] [address] in stable condition at 1530. She is to return on 2022 at  for her next appointment.     Mary Pride RN  2022  4:03 PM

## 2022-10-07 LAB — CORTIS SERPL-MCNC: 6.3 UG/DL

## 2022-10-07 NOTE — PROGRESS NOTES
Patient verified stating name and date of birth. Rayne Bulm informed of lab results and states understanding.

## 2022-10-07 NOTE — PROGRESS NOTES
Please call, labs are stable. Triglycerides were slightly elevated, make sure she follows a low saturated fat diet and she should stay on her rosuvastatin.

## 2022-10-14 ENCOUNTER — CLINICAL SUPPORT (OUTPATIENT)
Dept: FAMILY MEDICINE CLINIC | Age: 52
End: 2022-10-14
Payer: COMMERCIAL

## 2022-10-14 VITALS — TEMPERATURE: 97.6 F

## 2022-10-14 DIAGNOSIS — D32.0 BENIGN MENINGIOMA OF BRAIN (HCC): Primary | ICD-10-CM

## 2022-10-14 DIAGNOSIS — Z23 ENCOUNTER FOR IMMUNIZATION: ICD-10-CM

## 2022-10-14 PROCEDURE — G0008 ADMIN INFLUENZA VIRUS VAC: HCPCS | Performed by: NURSE PRACTITIONER

## 2022-10-14 PROCEDURE — 90471 IMMUNIZATION ADMIN: CPT | Performed by: NURSE PRACTITIONER

## 2022-10-14 PROCEDURE — 90686 IIV4 VACC NO PRSV 0.5 ML IM: CPT | Performed by: NURSE PRACTITIONER

## 2022-11-14 ENCOUNTER — CLINICAL SUPPORT (OUTPATIENT)
Dept: FAMILY MEDICINE CLINIC | Age: 52
End: 2022-11-14
Payer: COMMERCIAL

## 2022-11-14 ENCOUNTER — HOSPITAL ENCOUNTER (OUTPATIENT)
Dept: INFUSION THERAPY | Age: 52
Discharge: HOME | End: 2022-11-14
Payer: COMMERCIAL

## 2022-11-14 VITALS
RESPIRATION RATE: 16 BRPM | SYSTOLIC BLOOD PRESSURE: 117 MMHG | HEART RATE: 75 BPM | TEMPERATURE: 98.1 F | BODY MASS INDEX: 25.56 KG/M2 | OXYGEN SATURATION: 96 % | WEIGHT: 153.6 LBS | DIASTOLIC BLOOD PRESSURE: 72 MMHG

## 2022-11-14 DIAGNOSIS — D32.0 BENIGN MENINGIOMA OF BRAIN (HCC): Primary | ICD-10-CM

## 2022-11-14 PROCEDURE — 74011000250 HC RX REV CODE- 250: Performed by: NURSE PRACTITIONER

## 2022-11-14 PROCEDURE — 96523 IRRIG DRUG DELIVERY DEVICE: CPT

## 2022-11-14 PROCEDURE — 96372 THER/PROPH/DIAG INJ SC/IM: CPT | Performed by: NURSE PRACTITIONER

## 2022-11-14 PROCEDURE — 74011250636 HC RX REV CODE- 250/636: Performed by: NURSE PRACTITIONER

## 2022-11-14 PROCEDURE — 77030012965 HC NDL HUBR BBMI -A

## 2022-11-14 RX ORDER — HEPARIN 100 UNIT/ML
500 SYRINGE INTRAVENOUS AS NEEDED
Status: DISCONTINUED | OUTPATIENT
Start: 2022-11-14 | End: 2022-11-15 | Stop reason: HOSPADM

## 2022-11-14 RX ORDER — HEPARIN 100 UNIT/ML
500 SYRINGE INTRAVENOUS AS NEEDED
OUTPATIENT
Start: 2022-12-11

## 2022-11-14 RX ORDER — SODIUM CHLORIDE 9 MG/ML
10 INJECTION INTRAMUSCULAR; INTRAVENOUS; SUBCUTANEOUS AS NEEDED
OUTPATIENT
Start: 2022-12-11

## 2022-11-14 RX ORDER — SODIUM CHLORIDE 0.9 % (FLUSH) 0.9 %
5-10 SYRINGE (ML) INJECTION AS NEEDED
Status: DISCONTINUED | OUTPATIENT
Start: 2022-11-14 | End: 2022-11-15 | Stop reason: HOSPADM

## 2022-11-14 RX ORDER — SODIUM CHLORIDE 0.9 % (FLUSH) 0.9 %
10 SYRINGE (ML) INJECTION AS NEEDED
Status: DISCONTINUED | OUTPATIENT
Start: 2022-11-14 | End: 2022-11-15 | Stop reason: HOSPADM

## 2022-11-14 RX ORDER — SODIUM CHLORIDE 0.9 % (FLUSH) 0.9 %
5-10 SYRINGE (ML) INJECTION AS NEEDED
OUTPATIENT
Start: 2022-12-11

## 2022-11-14 RX ADMIN — SODIUM CHLORIDE, PRESERVATIVE FREE 10 ML: 5 INJECTION INTRAVENOUS at 15:40

## 2022-11-14 RX ADMIN — HEPARIN 500 UNITS: 100 SYRINGE at 15:41

## 2022-11-14 NOTE — PROGRESS NOTES
[facility] Progress Note    Date: 2022    Name: Belen Aguila    MRN: [id number]         : 1970    Monthly Port flush     Ms. Milton Amos was assessed and education was provided. She denies new problems. States she will be having eye surgery soon to correct her double vision. Ms. Pham's vitals were reviewed and patient was observed for 5 minutes prior to procedure. Visit Vitals  /72 (BP 1 Location: Right upper arm, BP Patient Position: Sitting)   Pulse 75   Temp 98.1 °F (36.7 °C)   Resp 16   Wt 69.7 kg (153 lb 9.6 oz)   SpO2 96%   BMI 25.56 kg/m²       Mediport was accessed with 20 gauge, 1 inch florez after chloroprep.    right chest, single  Blood return: YES    Flushed 10 ml of NS followed by Heparin 500u    Florez needle removed. Site intact and Band-Aid applied. Ms. Milton Amos tolerated the procedure, and had no complaints. Ms. Milton Amos was discharged from [place] [address] in stable condition at [phone]. She states she will call to schedule her next appointment because it will be used when she has her procedure so she may not need a flush in December.   Levander Spurling, RN  2022  4:05 PM

## 2022-11-14 NOTE — PROGRESS NOTES
Sandostatin LAR Depot injection given in L hip per Sumi Chaves [facility]#[id number] Exp#12-

## 2022-12-12 ENCOUNTER — OFFICE VISIT (OUTPATIENT)
Dept: FAMILY MEDICINE CLINIC | Age: 52
End: 2022-12-12
Payer: COMMERCIAL

## 2022-12-12 ENCOUNTER — HOSPITAL ENCOUNTER (OUTPATIENT)
Dept: LAB | Age: 52
Discharge: HOME | End: 2022-12-12

## 2022-12-12 ENCOUNTER — OFFICE VISIT (OUTPATIENT)
Dept: SURGERY | Age: 52
End: 2022-12-12

## 2022-12-12 VITALS
HEART RATE: 71 BPM | TEMPERATURE: 97.1 F | SYSTOLIC BLOOD PRESSURE: 122 MMHG | HEIGHT: 64 IN | OXYGEN SATURATION: 96 % | BODY MASS INDEX: 26.12 KG/M2 | WEIGHT: 153 LBS | DIASTOLIC BLOOD PRESSURE: 80 MMHG

## 2022-12-12 VITALS
HEIGHT: 65 IN | HEART RATE: 81 BPM | DIASTOLIC BLOOD PRESSURE: 60 MMHG | SYSTOLIC BLOOD PRESSURE: 120 MMHG | RESPIRATION RATE: 18 BRPM | BODY MASS INDEX: 25.76 KG/M2 | WEIGHT: 154.6 LBS | TEMPERATURE: 97.6 F | OXYGEN SATURATION: 98 %

## 2022-12-12 DIAGNOSIS — H92.12 EAR DRAINAGE, LEFT: ICD-10-CM

## 2022-12-12 DIAGNOSIS — R73.01 IFG (IMPAIRED FASTING GLUCOSE): ICD-10-CM

## 2022-12-12 DIAGNOSIS — D32.0 BENIGN MENINGIOMA OF BRAIN (HCC): ICD-10-CM

## 2022-12-12 DIAGNOSIS — Z01.818 PRE-OP EVALUATION: Primary | ICD-10-CM

## 2022-12-12 DIAGNOSIS — E16.2 HYPOGLYCEMIA: ICD-10-CM

## 2022-12-12 DIAGNOSIS — Z12.11 COLON CANCER SCREENING: Primary | ICD-10-CM

## 2022-12-12 LAB
B-OH-BUTYR SERPL-SCNC: 0.1 MMOL/L
GLUCOSE SERPL-MCNC: 130 MG/DL (ref 65–100)
HBA1C MFR BLD HPLC: 5.4 %

## 2022-12-12 PROCEDURE — 99214 OFFICE O/P EST MOD 30 MIN: CPT | Performed by: NURSE PRACTITIONER

## 2022-12-12 PROCEDURE — 83036 HEMOGLOBIN GLYCOSYLATED A1C: CPT | Performed by: NURSE PRACTITIONER

## 2022-12-12 NOTE — PATIENT INSTRUCTIONS
Learning About Colonoscopy  What is a colonoscopy? A colonoscopy is a test (also called a procedure) that lets a doctor look inside your large intestine. The doctor uses a thin, lighted tube called a colonoscope. The doctor uses it to look for small growths called polyps, colon or rectal cancer (colorectal cancer), or other problems like bleeding. During the procedure, the doctor can take samples of tissue. The samples can then be checked for cancer or other conditions. The doctor can also take out polyps. How is a colonoscopy done? This procedure is done in a doctor's office or a clinic or hospital. You will get medicine to help you relax and not feel pain. Some people find that they don't remember having the test because of the medicine. The doctor gently moves the colonoscope, or scope, through the colon. The scope is also a small video camera. It lets the doctor see the colon and take pictures. How do you prepare for the procedure? You need to clean out your colon before the procedure so the doctor can see your colon. This depends on which \"colon prep\" your doctor recommends. To clean out your colon, you'll do a \"colon prep\" before the test. This means you stop eating solid foods and drink only clear liquids. You can have water, tea, coffee, clear juices, clear broths, flavored ice pops, and gelatin (such as Jell-O). Do not drink anything red or purple. The day or night before the procedure, you drink a large amount of a special liquid. This causes loose, frequent stools. You will go to the bathroom a lot. Your doctor may have you drink part of the liquid the evening before and the rest on the day of the test. It's very important to drink all of the liquid. If you have problems drinking it, call your doctor. Arrange to have someone take you home after the test.  What can you expect after a colonoscopy? Your doctor will tell you when you can eat and do your usual activities.   Drink a lot of fluid after the test to replace the fluids you may have lost during the colon prep. But don't drink alcohol. Your doctor will talk to you about when you'll need your next colonoscopy. The results of your test and your risk for colorectal cancer will help your doctor decide how often you need to be checked. After the test, you may be bloated or have gas pains. You may need to pass gas. If a biopsy was done or a polyp was removed, you may have streaks of blood in your stool (feces) for a few days. Check with your doctor to see when it is safe to take aspirin and nonsteroidal anti-inflammatory drugs (NSAIDs) again. Problems such as heavy rectal bleeding may not occur until several weeks after the test. This isn't common. But it can happen after polyps are removed. Follow-up care is a key part of your treatment and safety. Be sure to make and go to all appointments, and call your doctor if you are having problems. It's also a good idea to know your test results and keep a list of the medicines you take. Where can you learn more? Go to http://www.gray.com/  Enter Z368 in the search box to learn more about \"Learning About Colonoscopy. \"  Current as of: May 4, 2022               Content Version: 13.4  © 2006-2022 [employer]wise[employer] Inc[employer]. Care instructions adapted under license by [employer] (which disclaims liability or warranty for this information). If you have questions about a medical condition or this instruction, always ask your healthcare professional. [place] [address] any warranty or liability for your use of this information.

## 2022-12-12 NOTE — PROGRESS NOTES
1. \"Have you been to the ER, urgent care clinic since your last visit? Hospitalized since your last visit? \" No    2. \"Have you seen or consulted any other health care providers outside of the [address] [place] [address] since your last visit? \" No     3. For patients aged 39-70: Has the patient had a colonoscopy / FIT/ Cologuard? Yes - Care Gap present. Most recent result on file      If the patient is female:    4. For patients aged 41-77: Has the patient had a mammogram within the past 2 years? Yes - Care Gap present. Most recent result on file      5. For patients aged 21-65: Has the patient had a pap smear? Yes - Care Gap present.  Most recent result on file

## 2022-12-12 NOTE — PROGRESS NOTES
Mel Montgomery is a 47 y.o. female who presents today with the following:  Chief Complaint   Patient presents with    New Patient    Colon Cancer Screening       HPI    48-year-old female who presents as a referral from [facility] for possible screening colonoscopy. She has had no prior colon evaluation. She has no first-degree relative with colon cancer although she does have a paternal uncle who had colon cancer. She denies any melena or hematochezia or diarrhea or constipation. She has had no abdominal pain or unexpected weight loss or change in the caliber of her stool. She has not had any recent stool testing. She does have a history of a benign meningioma and has had 2 craniotomies. The second was in December 3609 was complicated with a postoperative CVA for which she still has some residual aphasia. She also underwent proton beam therapy in July 2020. She has had strabismus surgery on her left eye. She is also had a laparoscopic cholecystectomy. She is not on aspirin or any other blood thinners. She has been vaccinated for COVID and has not contracted COVID. Past Medical History:   Diagnosis Date    Brain tumor (benign) ([address] 2010    meningioma    Contact dermatitis and other eczema, due to unspecified cause 2015    morphoea    Headache     brain tumor/ medication    Leukemia ([address] 5    age 4-4 years old    Menopause     Seizures ([address]        Past Surgical History:   Procedure Laterality Date    HX CHOLECYSTECTOMY  2016    HX ENDOSCOPY  2016    HX SKIN BIOPSY  2-2015    benign / back left spine    HX TUMOR REMOVAL  12/20/2019    Brain [facility].     NEUROLOGICAL PROCEDURE UNLISTED  6-2010    brain tumor 60% removed       Social History     Socioeconomic History    Marital status:      Spouse name: Not on file    Number of children: Not on file    Years of education: Not on file    Highest education level: Not on file   Occupational History    Not on file   Tobacco Use Smoking status: Never    Smokeless tobacco: Never   Vaping Use    Vaping Use: Never used   Substance and Sexual Activity    Alcohol use: No     Comment: rare    Drug use: Not on file    Sexual activity: Not on file   Other Topics Concern    Not on file   Social History Narrative    Not on file     Social Determinants of Health     Financial Resource Strain: Not on file   Food Insecurity: Not on file   Transportation Needs: Not on file   Physical Activity: Not on file   Stress: Not on file   Social Connections: Not on file   Intimate Partner Violence: Not on file   Housing Stability: Not on file       Family History   Problem Relation Age of Onset    Asthma Mother     Breast Cancer Sister     Heart Disease Paternal Grandfather     Heart Disease Paternal Uncle        Allergies   Allergen Reactions    Pcn [Penicillins] Hives       Current Outpatient Medications   Medication Sig    rosuvastatin (CRESTOR) 10 mg tablet TAKE 1 TABLET BY MOUTH EVERY EVENING    PARoxetine (PAXIL) 10 mg tablet TK 1 T PO D    lamoTRIgine (LaMICtal) 200 mg tablet TK 1 T PO BID    SANDOSTATIN LAR DEPOT 30 mg serr injection by Injection route every thirty (30) days. No current facility-administered medications for this visit. The above histories, medications and allergies have been reviewed. ROS    Visit Vitals  /80 (BP 1 Location: Left upper arm, BP Patient Position: Sitting)   Pulse 71   Temp 97.1 °F (36.2 °C) (Temporal)   Ht 5' 4\" (1.626 m)   Wt 153 lb (69.4 kg)   LMP 07/04/2015   SpO2 96%   BMI 26.26 kg/m²     Physical Exam  Constitutional:       Appearance: Normal appearance. Cardiovascular:      Rate and Rhythm: Normal rate and regular rhythm. Heart sounds: Normal heart sounds. No murmur heard. Pulmonary:      Effort: No respiratory distress. Breath sounds: Normal breath sounds. No stridor. No wheezing. Abdominal:      General: There is no distension. Palpations: Abdomen is soft. There is no mass. Tenderness: There is no abdominal tenderness. Neurological:      Mental Status: She is alert. 1. Colon cancer screening  Recommend colonoscopy. The procedure was explained in detail including the risks and benefits. Risks shared included risks of missed lesions, incomplete exam, colon injury or perforation. Risks associated with anesthesia were also discussed. The patient wishes to proceed and we will schedule. We will use a pediatric scope based on her body habitus. The patient was counseled at length about the risks of haim Covid-19 during their perioperative period and any recovery window from their procedure. The patient was made aware that haim Covid-19  may worsen their prognosis for recovering from their procedure and lend to a higher morbidity and/or mortality risk. All material risks, benefits, and reasonable alternatives including postponing the procedure were discussed. The patient does  wish to proceed with the procedure at this time. Follow-up and Dispositions    Return for post procedure.          Tawny Alvarado MD

## 2022-12-12 NOTE — PROGRESS NOTES
Identified pt with two pt identifiers (name and ). Reviewed chart in preparation for visit and have obtained necessary documentation. Lelia Davis is a 47 y.o. female  Chief Complaint   Patient presents with    New Patient    Colon Cancer Screening     [facility]       1. Have you been to the ER, urgent care clinic since your last visit? Hospitalized since your last visit? No    2. Have you seen or consulted any other health care providers outside of the [address] [place] [address] since your last visit? Include any pap smears or colon screening.  Yes Where: [facility]

## 2022-12-12 NOTE — LETTER
12/12/2022    Patient: Chantale Kathleen   YOB: 1970   Date of Visit: 12/12/2022     [address]  [address]  Via In Basket    Dear Geovanna Hall NP,      Thank you for referring Ms. Sheba Fenton to [facility] for evaluation. My notes for this consultation are attached. If you have questions, please do not hesitate to call me. I look forward to following your patient along with you.       Sincerely,    April Del Rosario MD

## 2022-12-12 NOTE — PROGRESS NOTES
Chief Complaint   Patient presents with    Pre-op Exam       HPI:    Vik Dean is a 47 y.o. female in today with her  for a pre op visit. Benign brain meningioma: Under the care of Dr. Kianna Massey, Dr. Salud Escoto at [facility], follows Q6 months. Underwent left retrosignoid craniotomy for resection of mass in June 2010. Recurrence of disease in middle cranial fossa and displacing left temporal lobe. Had a left pterional craniotomy for resection of the mass in December 2019. Completed proton therapy with Dr. Corbin Braden in [facility]. Meningioma is stable. 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 T-tube placed on the L in January 2022. New issues: She is scheduled for a L eye strabismus surgery on 12/22/22 with [facility]. This will be under general anesthesia. No hx of anesthesia reactions, blood clots, latex allergy. She continues with drainage, sometimes bloody from the L ear. No pain. Her  also reports rare episodes of shaking, weakness. This improves with eating food. No hx of T2DM. She can go months in between episodes. Allergies   Allergen Reactions    Pcn [Penicillins] Hives       Current Outpatient Medications   Medication Sig    rosuvastatin (CRESTOR) 10 mg tablet TAKE 1 TABLET BY MOUTH EVERY EVENING    PARoxetine (PAXIL) 10 mg tablet TK 1 T PO D    lamoTRIgine (LaMICtal) 200 mg tablet TK 1 T PO BID    SANDOSTATIN LAR DEPOT 30 mg serr injection by Injection route every thirty (30) days. No current facility-administered medications for this visit.        Past Medical History:   Diagnosis Date    Brain tumor (benign) ([address] 2010    meningioma    Contact dermatitis and other eczema, due to unspecified cause 2015    morphoea    Headache     brain tumor/ medication    Leukemia ([address] 5    age 4-4 years old    Menopause     Seizures ([address]        Family History   Problem Relation Age of Onset    Asthma Mother     Breast Cancer Sister     Heart Disease Paternal Grandfather Heart Disease Paternal Uncle        ROS:  Denies fever, chills, cough, chest pain, SOB,  nausea, vomiting, diarrhea, dysuria. Denies rashes, wounds, arthralgias, weakness, numbness, visual changes, depression. Denies wt loss, wt gain, hemoptysis, hematochezia or melena. Patient is not experiencing chest pain radiating to the jaw and/or down the arms. Physical Examination:    /60 (BP 1 Location: Right arm, BP Patient Position: Sitting, BP Cuff Size: Adult)   Pulse 81   Temp 97.6 °F (36.4 °C) (Temporal)   Resp 18   Ht 5' 5\" (1.651 m)   Wt 154 lb 9.6 oz (70.1 kg)   LMP 07/04/2015   SpO2 98%   BMI 25.73 kg/m²     Wt Readings from Last 3 Encounters:   12/12/22 154 lb 9.6 oz (70.1 kg)   11/14/22 153 lb 9.6 oz (69.7 kg)   10/06/22 155 lb 3.2 oz (70.4 kg)     Constitutional: WDWN Female in no acute distress  HENT:  NC/AT, L TM intact, bright red blood from external canal, R TM pearly gray. OP: normal  Neck:  Supple, no JVD, mass or bruit. No thyromegaly. Respiratory:  Respirations even and unlabored without use of accessory muscles, CTA throughout without wheezes, rales, rubs or rhonchi. Symmetrical chest expansion. Cardiac: RRR no clicks, murmurs, gallops, or rubs  Abd: Soft, non-tender, no HSM  Musculoskeletal:  No cyanosis, clubbing or edema of extremities. Moves all extremities without difficulty. Neurologic:  Smooth, even gait without assistance, CN 2-12 grossly intact. Skin: intact and warm to the touch, no rash   Lymphadenopathy: no cervical or supraclavicular nodes  Psych: Pleasant and appropriate. Judgment normal. Alert and oriented x 3. ASSESSMENT AND PLAN:       ICD-10-CM ICD-9-CM    1. Pre-op evaluation  Z01.818 V72.84       2. IFG (impaired fasting glucose)  R73.01 790.21 AMB POC HEMOGLOBIN A1C      COLLECTION CAPILLARY BLOOD SPECIMEN      3.  Hypoglycemia  E16.2 251.2 C-PEPTIDE      GLUCOSE, RANDOM      INSULIN      BETA-HYDROXYBUTYRATE      C-PEPTIDE      GLUCOSE, RANDOM INSULIN      4. Benign meningioma of brain (HCC)  D32.0 225.2       5. Ear drainage, left  H92.12 388.60         A1c 5.4% today. Additional labs as above, to be drawn from her port at [place]. Discussed ear drainage, Stop using Qtips. Monitor closely, may need to go back to ENT if no resolution. Low risk for planned procedure, pre-op form completed. EKG and labs done 2 months ago, okay. Medication Side Effects and Warnings were discussed with patient:  NA  Patient Labs were reviewed:  yes  Patient Past Records were reviewed:  yes    Patient aware of plan of care and verbalized understanding. Questions answered. RTC in 2 weeks if ear drainage persists.     Greg Wang, NP

## 2022-12-13 LAB
C PEPTIDE SERPL-MCNC: 4.8 NG/ML (ref 1.1–4.4)
INSULIN SERPL-ACNC: 24.5 UIU/ML (ref 2.6–24.9)

## 2022-12-16 NOTE — PROGRESS NOTES
Sp/w pt's , confirmed with 2 identifiers, advised of results and recommendations, pt expressed understanding.  KT

## 2022-12-23 ENCOUNTER — CLINICAL SUPPORT (OUTPATIENT)
Dept: FAMILY MEDICINE CLINIC | Age: 52
End: 2022-12-23
Payer: COMMERCIAL

## 2022-12-23 VITALS — TEMPERATURE: 97.8 F

## 2022-12-23 DIAGNOSIS — D32.0 BENIGN MENINGIOMA OF BRAIN (HCC): Primary | ICD-10-CM

## 2022-12-23 PROCEDURE — 96372 THER/PROPH/DIAG INJ SC/IM: CPT | Performed by: NURSE PRACTITIONER

## 2023-01-11 NOTE — PERIOP NOTES
[address] [place] [address]  SURGICAL PRE-ADMISSION INSTRUCTIONS    ARRIVAL  You will be called the day before your surgery with your expected arrival time. Sign in at the  of the hospital.  You will be directed to the Surgical Waiting Room. Please arrive at your scheduled appointment time. You have been scheduled to arrive for your procedure one or two hours prior to the expected start time of your procedure. Every effort will be made to minimize your wait but please be aware that unforeseen circumstances may affect our schedule. EATING  DO NOT EAT OR DRINK ANYTHING AFTER MIDNIGHT ON THE EVENING BEFORE YOUR SURGERY OR ON THE DAY OF YOUR SURGERY except for your medications (as instructed) with a sip of water. Do not use gum, mints or lozenges on the morning of your surgery. Please do not smoke or chew tobacco before your surgery. MEDICATIONS   Take the following medications on the morning of your surgery with the smallest amount of water possible : lamictal    STOP THESE MEDICATIONS AT THE TIMES LISTED BELOW  NSAIDS (Indocin, Ibuprofen, Naprosyn) ;  7 days before     DRIVING/TRANSPORATION  Have a responsible adult to drive you home from the hospital and to stay with you over night. Please have them plan to remain in the hospital during your surgery. Your surgery will not be done if you do not have a responsible adult to take you home and to stay with you. If you have arranged for public transport, you must have a responsible adult to ride with you (who is not the ). You may not drive for 24 hours after anesthesia. PREPARATION  If you have a Living WiIl/Advance Directive, please bring a copy with you to scan into your chart. Please DO NOT wear makeup or nail polish  Please leave valuables at home,  DO NOT wear jewelry. Wear loose, comfortable clothing that is large enough to cover a bulky dressing.     SPECIAL INSTRUCTIONS:  Follow your surgeon's instructions for preoperative bowel prep.     Reviewed above preoperative instructions and answered questions by phone interview    Patient:  Felipe Redd   Date:     January 11, 2023  Time:   9:52 AM    RN:  Anuel Mata RN    Date:     January 11, 2023  Time:   9:52 AM

## 2023-01-16 NOTE — H&P
History and Physical      HPI    54-year-old female who presents as a referral from [facility] for possible screening colonoscopy. She has had no prior colon evaluation. She has no first-degree relative with colon cancer although she does have a paternal uncle who had colon cancer. She denies any melena or hematochezia or diarrhea or constipation. She has had no abdominal pain or unexpected weight loss or change in the caliber of her stool. She has not had any recent stool testing. She does have a history of a benign meningioma and has had 2 craniotomies. 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 She also underwent proton beam therapy in July 2020. She has had strabismus surgery on her left eye. She is also had a laparoscopic cholecystectomy. She is not on aspirin or any other blood thinners. She has been vaccinated for COVID and has not contracted COVID. Past Medical History:   Diagnosis Date    Brain tumor (benign) ([facility] [address] 2010    meningioma    Contact dermatitis and other eczema, due to unspecified cause 2015    morphoea    Headache     brain tumor/ medication    Leukemia ([facility] [address] 5    age 4-4 years old    Menopause     Seizures ([facility] [address]        Past Surgical History:   Procedure Laterality Date    HX CHOLECYSTECTOMY  2016    HX ENDOSCOPY  2016    HX SKIN BIOPSY  2-2015    benign / back left spine    HX TUMOR REMOVAL  12/20/2019    Brain [facility].     [place] UNLISTED NEUROLOGICAL/NEUROMUSCULAR DX PX  6-2010    brain tumor 60% removed       Social History     Socioeconomic History    Marital status:      Spouse name: Not on file    Number of children: Not on file    Years of education: Not on file    Highest education level: Not on file   Occupational History    Not on file   Tobacco Use    Smoking status: Never    Smokeless tobacco: Never   Vaping Use    Vaping Use: Never used   Substance and Sexual Activity Alcohol use: No     Comment: rare    Drug use: Never    Sexual activity: Not on file   Other Topics Concern    Not on file   Social History Narrative    Not on file     Social Determinants of Health     Financial Resource Strain: Not on file   Food Insecurity: Not on file   Transportation Needs: Not on file   Physical Activity: Not on file   Stress: Not on file   Social Connections: Not on file   Intimate Partner Violence: Not on file   Housing Stability: Not on file       Family History   Problem Relation Age of Onset    Asthma Mother     Breast Cancer Sister     Heart Disease Paternal Grandfather     Heart Disease Paternal Uncle        Allergies   Allergen Reactions    Pcn [Penicillins] Hives       No current facility-administered medications for this encounter. Current Outpatient Medications   Medication Sig    PARoxetine (PAXIL) 10 mg tablet TK 1 T PO D    lamoTRIgine (LaMICtal) 200 mg tablet TK 1 T PO BID    SANDOSTATIN LAR DEPOT 30 mg serr injection by Injection route every thirty (30) days. rosuvastatin (CRESTOR) 10 mg tablet TAKE 1 TABLET BY MOUTH EVERY EVENING       The above histories, medications and allergies have been reviewed. ROS    Visit Vitals  Ht 5' 4\" (1.626 m)   Wt 150 lb (68 kg)   LMP 07/04/2015   BMI 25.75 kg/m²     Physical Exam  Constitutional:       Appearance: Normal appearance. Cardiovascular:      Rate and Rhythm: Normal rate and regular rhythm. Heart sounds: Normal heart sounds. No murmur heard. Pulmonary:      Effort: No respiratory distress. Breath sounds: Normal breath sounds. No stridor. No wheezing. Abdominal:      General: There is no distension. Palpations: Abdomen is soft. There is no mass. Tenderness: There is no abdominal tenderness. Neurological:      Mental Status: She is alert. 1. Colon cancer screening  Recommend colonoscopy. The procedure was explained in detail including the risks and benefits.   Risks shared included risks of missed lesions, incomplete exam, colon injury or perforation. Risks associated with anesthesia were also discussed. The patient wishes to proceed and we will schedule. We will use a pediatric scope based on her body habitus. The patient was counseled at length about the risks of haim Covid-19 during their perioperative period and any recovery window from their procedure. The patient was made aware that haim Covid-19  may worsen their prognosis for recovering from their procedure and lend to a higher morbidity and/or mortality risk. All material risks, benefits, and reasonable alternatives including postponing the procedure were discussed. The patient does  wish to proceed with the procedure at this time.                Jimena Renee MD

## 2023-01-17 ENCOUNTER — HOSPITAL ENCOUNTER (OUTPATIENT)
Age: 53
Setting detail: OUTPATIENT SURGERY
Discharge: HOME | End: 2023-01-17
Attending: SURGERY | Admitting: SURGERY
Payer: COMMERCIAL

## 2023-01-17 ENCOUNTER — ANESTHESIA EVENT (OUTPATIENT)
Dept: SURGERY | Age: 53
End: 2023-01-17
Payer: COMMERCIAL

## 2023-01-17 ENCOUNTER — ANESTHESIA (OUTPATIENT)
Dept: SURGERY | Age: 53
End: 2023-01-17
Payer: COMMERCIAL

## 2023-01-17 VITALS
OXYGEN SATURATION: 99 % | RESPIRATION RATE: 22 BRPM | WEIGHT: 150 LBS | BODY MASS INDEX: 25.61 KG/M2 | SYSTOLIC BLOOD PRESSURE: 112 MMHG | DIASTOLIC BLOOD PRESSURE: 84 MMHG | HEIGHT: 64 IN | TEMPERATURE: 97.8 F | HEART RATE: 62 BPM

## 2023-01-17 DIAGNOSIS — Z12.11 COLON CANCER SCREENING: Primary | ICD-10-CM

## 2023-01-17 PROCEDURE — 45380 COLONOSCOPY AND BIOPSY: CPT | Performed by: SURGERY

## 2023-01-17 PROCEDURE — 76010000138 HC OR TIME 0.5 TO 1 HR: Performed by: SURGERY

## 2023-01-17 PROCEDURE — 74011250636 HC RX REV CODE- 250/636: Performed by: SURGERY

## 2023-01-17 PROCEDURE — 74011000250 HC RX REV CODE- 250: Performed by: ANESTHESIOLOGY

## 2023-01-17 PROCEDURE — 2709999900 HC NON-CHARGEABLE SUPPLY: Performed by: SURGERY

## 2023-01-17 PROCEDURE — 74011250636 HC RX REV CODE- 250/636: Performed by: ANESTHESIOLOGY

## 2023-01-17 PROCEDURE — 88305 TISSUE EXAM BY PATHOLOGIST: CPT

## 2023-01-17 PROCEDURE — 76210000063 HC OR PH I REC FIRST 0.5 HR: Performed by: SURGERY

## 2023-01-17 PROCEDURE — 77030037041 HC FCPS HOT BIOP ENDOSC RAD JAW DISP BSC -B: Performed by: SURGERY

## 2023-01-17 PROCEDURE — 76060000032 HC ANESTHESIA 0.5 TO 1 HR: Performed by: SURGERY

## 2023-01-17 RX ORDER — EPHEDRINE SULFATE/0.9% NACL/PF 50 MG/5 ML
SYRINGE (ML) INTRAVENOUS AS NEEDED
Status: DISCONTINUED | OUTPATIENT
Start: 2023-01-17 | End: 2023-01-17 | Stop reason: HOSPADM

## 2023-01-17 RX ORDER — PROPOFOL 10 MG/ML
INJECTION, EMULSION INTRAVENOUS AS NEEDED
Status: DISCONTINUED | OUTPATIENT
Start: 2023-01-17 | End: 2023-01-17 | Stop reason: HOSPADM

## 2023-01-17 RX ORDER — SODIUM CHLORIDE 0.9 % (FLUSH) 0.9 %
5-40 SYRINGE (ML) INJECTION AS NEEDED
Status: DISCONTINUED | OUTPATIENT
Start: 2023-01-17 | End: 2023-01-17 | Stop reason: HOSPADM

## 2023-01-17 RX ORDER — SODIUM CHLORIDE, SODIUM LACTATE, POTASSIUM CHLORIDE, CALCIUM CHLORIDE 600; 310; 30; 20 MG/100ML; MG/100ML; MG/100ML; MG/100ML
INJECTION, SOLUTION INTRAVENOUS
Status: DISCONTINUED | OUTPATIENT
Start: 2023-01-17 | End: 2023-01-17 | Stop reason: HOSPADM

## 2023-01-17 RX ORDER — SODIUM CHLORIDE, SODIUM LACTATE, POTASSIUM CHLORIDE, CALCIUM CHLORIDE 600; 310; 30; 20 MG/100ML; MG/100ML; MG/100ML; MG/100ML
125 INJECTION, SOLUTION INTRAVENOUS CONTINUOUS
Status: DISCONTINUED | OUTPATIENT
Start: 2023-01-17 | End: 2023-01-17 | Stop reason: HOSPADM

## 2023-01-17 RX ORDER — SODIUM CHLORIDE 0.9 % (FLUSH) 0.9 %
5-40 SYRINGE (ML) INJECTION EVERY 8 HOURS
Status: DISCONTINUED | OUTPATIENT
Start: 2023-01-17 | End: 2023-01-17 | Stop reason: HOSPADM

## 2023-01-17 RX ADMIN — PROPOFOL 50 MG: 10 INJECTION, EMULSION INTRAVENOUS at 11:17

## 2023-01-17 RX ADMIN — PROPOFOL 40 MG: 10 INJECTION, EMULSION INTRAVENOUS at 11:43

## 2023-01-17 RX ADMIN — PROPOFOL 30 MG: 10 INJECTION, EMULSION INTRAVENOUS at 11:37

## 2023-01-17 RX ADMIN — PROPOFOL 30 MG: 10 INJECTION, EMULSION INTRAVENOUS at 11:21

## 2023-01-17 RX ADMIN — SODIUM CHLORIDE, POTASSIUM CHLORIDE, SODIUM LACTATE AND CALCIUM CHLORIDE 125 ML/HR: 600; 310; 30; 20 INJECTION, SOLUTION INTRAVENOUS at 10:07

## 2023-01-17 RX ADMIN — Medication 700 MCG: at 11:44

## 2023-01-17 RX ADMIN — PROPOFOL 30 MG: 10 INJECTION, EMULSION INTRAVENOUS at 11:13

## 2023-01-17 RX ADMIN — SODIUM CHLORIDE, POTASSIUM CHLORIDE, SODIUM LACTATE AND CALCIUM CHLORIDE: 600; 310; 30; 20 INJECTION, SOLUTION INTRAVENOUS at 11:08

## 2023-01-17 RX ADMIN — PROPOFOL 40 MG: 10 INJECTION, EMULSION INTRAVENOUS at 11:31

## 2023-01-17 NOTE — BRIEF OP NOTE
Brief Postoperative Note    Patient: Jaxson Jacome  YOB: 1970  MRN: [id number]    Date of Procedure: 1/17/2023     Pre-Op Diagnosis: SCREENING    Post-Op Diagnosis: Same as preoperative diagnosis. Procedure(s):  COLONOSCOPY with cold biopsy polyectomy    Surgeon(s):  Rashida Bowser MD    Surgical Assistant: None    Anesthesia: MAC     Estimated Blood Loss (mL): Minimal    Complications: None    Specimens:   ID Type Source Tests Collected by Time Destination   1 : polyp @ 95 cm Preservative Colon  Rashida Bowser MD 1/17/2023 1140 Pathology   2 : rectal biopsy Preservative Rectal  Rashida Bowser MD 1/17/2023 1150 Pathology        Implants: * No implants in log *    Drains: * No LDAs found *    Findings: 1. Colon polyp at 95 cm  2.  Biopsy of rectum    Electronically Signed by Porsha Garner MD on 1/17/2023 at 11:55 AM

## 2023-01-17 NOTE — OP NOTES
[facility]  OPERATIVE REPORT    Name:  Altaf Mitchell  MR#:  [id number]  :  1970  ACCOUNT #:  [de-identified]  DATE OF SERVICE:  2023    PREOPERATIVE DIAGNOSIS:  Screening colonoscopy. POSTOPERATIVE DIAGNOSIS:  Screening colonoscopy. PROCEDURE PERFORMED:  Colonoscopy, with cold biopsy polypectomy. SURGEON:  Carolynn Frances MD    ASSISTANT:  None    ANESTHESIA:  MAC.    COMPLICATIONS:  None. SPECIMENS REMOVED:  1. Polyp at 95 cm. 2.  Rectal biopsy. IMPLANTS:  None. DRAINS:  None. ESTIMATED BLOOD LOSS:  Minimal.    FINDINGS:  1. Colon polyp at 95 cm, removed by cold biopsy polypectomy. 2.  Biopsy of rectum. DISPOSITION:  Stable. PROCEDURE:  The patient was brought to the operative theater. Monitoring devices and nasal cannula O2 were placed per Anesthesia, and the patient was placed in left-side down decubitus position. IV sedation was administered and a time-out was performed. Digital rectal exam was performed which was essentially normal.  A well-lubricated Olympus colonoscope was introduced into the patient's rectum and advanced to the cecum. This was a pediatric scope. The cecum was identified by the appendiceal orifice and the ileocecal valve. The prep was adequate to proceed. We did need to apply gentle abdominal pressure. The scope was carefully withdrawn with the mucosal surfaces circumferentially evaluated. We did identify a small polypoid lesion at approximately 95 cm that was removed by cold biopsy forceps. No other abnormalities were seen until we reached the rectum. There was a questionable area of mild thickening that flattened out of with insufflation but a representative biopsy was obtained. The scope was then retroflexed which revealed no abnormalities. The scope was straightened and carefully withdrawn. The patient tolerated the procedure well and was transferred to PACU in stable condition.       Sonny Ling MD [id number]  D:  01/17/2023 11:59  T:  01/17/2023 13:43  JOB #:  [id number]  CC:  Darrell Aaron , [facility]

## 2023-01-17 NOTE — ANESTHESIA POSTPROCEDURE EVALUATION
Procedure(s):  COLONOSCOPY. MAC    Anesthesia Post Evaluation      Multimodal analgesia: multimodal analgesia not used between 6 hours prior to anesthesia start to PACU discharge  Patient location during evaluation: bedside  Patient participation: complete - patient participated  Level of consciousness: awake  Pain management: adequate  Airway patency: patent  Anesthetic complications: no  Cardiovascular status: acceptable  Respiratory status: acceptable  Hydration status: acceptable  Post anesthesia nausea and vomiting:  none      INITIAL Post-op Vital signs:   Vitals Value Taken Time   BP 95/70 01/17/23 1200   Temp     Pulse 75 01/17/23 1204   Resp 11 01/17/23 1204   SpO2 95 % 01/17/23 1204   Vitals shown include unvalidated device data.

## 2023-01-17 NOTE — INTERVAL H&P NOTE
Update History & Physical    The Patient's History and Physical of January 16, 2023 was reviewed with the patient and I examined the patient. There was no change. The surgical site was confirmed by the patient and me. Plan:  The risk, benefits, expected outcome, and alternative to the recommended procedure have been discussed with the patient. Patient understands and wants to proceed with the procedure.     Electronically signed by Francisco Javier Ley MD on 1/17/2023 at 10:57 AM

## 2023-01-17 NOTE — ANESTHESIA PREPROCEDURE EVALUATION
Relevant Problems   NEUROLOGY   (+) Headache     Visit Vitals  /80   Pulse 73   Temp 36.6 °C (97.8 °F)   Resp 16   Ht 5' 4\" (1.626 m)   Wt 68 kg (150 lb)   LMP 07/04/2015   SpO2 98%   BMI 25.75 kg/m²       Anesthetic History   No history of anesthetic complications            Review of Systems / Medical History  Patient summary reviewed, nursing notes reviewed and pertinent labs reviewed    Pulmonary  Within defined limits                 Neuro/Psych     seizures  CVA      Comments: Brain tumor Cardiovascular                Pertinent negatives: No hypertension       GI/Hepatic/Renal  Within defined limits              Endo/Other  Within defined limits        Pertinent negatives: No diabetes   Other Findings            Physical Exam    Airway  Mallampati: II  TM Distance: > 6 cm  Neck ROM: normal range of motion   Mouth opening: Normal     Cardiovascular    Rhythm: regular  Rate: normal         Dental  No notable dental hx       Pulmonary  Breath sounds clear to auscultation               Abdominal         Other Findings            Anesthetic Plan    ASA: 3  Anesthesia type: MAC          Induction: Intravenous  Anesthetic plan and risks discussed with: Patient

## 2023-01-23 ENCOUNTER — CLINICAL SUPPORT (OUTPATIENT)
Dept: FAMILY MEDICINE CLINIC | Age: 53
End: 2023-01-23

## 2023-01-23 VITALS — TEMPERATURE: 97.6 F

## 2023-01-23 DIAGNOSIS — D32.0 BENIGN MENINGIOMA OF BRAIN (HCC): Primary | ICD-10-CM

## 2023-01-25 ENCOUNTER — OFFICE VISIT (OUTPATIENT)
Dept: SURGERY | Age: 53
End: 2023-01-25
Payer: COMMERCIAL

## 2023-01-25 ENCOUNTER — HOSPITAL ENCOUNTER (OUTPATIENT)
Dept: INFUSION THERAPY | Age: 53
Discharge: HOME | End: 2023-01-25
Payer: COMMERCIAL

## 2023-01-25 ENCOUNTER — OFFICE VISIT (OUTPATIENT)
Dept: FAMILY MEDICINE CLINIC | Age: 53
End: 2023-01-25

## 2023-01-25 VITALS
TEMPERATURE: 98 F | DIASTOLIC BLOOD PRESSURE: 73 MMHG | RESPIRATION RATE: 18 BRPM | HEART RATE: 79 BPM | SYSTOLIC BLOOD PRESSURE: 113 MMHG | BODY MASS INDEX: 26.63 KG/M2 | WEIGHT: 156 LBS | HEIGHT: 64 IN | OXYGEN SATURATION: 97 %

## 2023-01-25 VITALS
HEART RATE: 79 BPM | OXYGEN SATURATION: 98 % | TEMPERATURE: 97.9 F | SYSTOLIC BLOOD PRESSURE: 130 MMHG | WEIGHT: 155 LBS | HEIGHT: 64 IN | RESPIRATION RATE: 18 BRPM | BODY MASS INDEX: 26.46 KG/M2 | DIASTOLIC BLOOD PRESSURE: 84 MMHG

## 2023-01-25 VITALS
SYSTOLIC BLOOD PRESSURE: 112 MMHG | WEIGHT: 155.8 LBS | TEMPERATURE: 98 F | OXYGEN SATURATION: 99 % | HEIGHT: 64 IN | DIASTOLIC BLOOD PRESSURE: 69 MMHG | RESPIRATION RATE: 18 BRPM | HEART RATE: 72 BPM | BODY MASS INDEX: 26.6 KG/M2

## 2023-01-25 DIAGNOSIS — H93.8X2 SENSATION OF FULLNESS IN LEFT EAR: Primary | ICD-10-CM

## 2023-01-25 DIAGNOSIS — Z86.010 HX OF COLONIC POLYPS: Primary | ICD-10-CM

## 2023-01-25 DIAGNOSIS — D32.0 BENIGN MENINGIOMA OF BRAIN (HCC): Primary | ICD-10-CM

## 2023-01-25 PROCEDURE — 99213 OFFICE O/P EST LOW 20 MIN: CPT | Performed by: NURSE PRACTITIONER

## 2023-01-25 PROCEDURE — 77030012965 HC NDL HUBR BBMI -A

## 2023-01-25 PROCEDURE — 74011250636 HC RX REV CODE- 250/636: Performed by: NURSE PRACTITIONER

## 2023-01-25 PROCEDURE — 96523 IRRIG DRUG DELIVERY DEVICE: CPT

## 2023-01-25 PROCEDURE — 99213 OFFICE O/P EST LOW 20 MIN: CPT | Performed by: SURGERY

## 2023-01-25 PROCEDURE — 74011000250 HC RX REV CODE- 250: Performed by: NURSE PRACTITIONER

## 2023-01-25 RX ORDER — SODIUM CHLORIDE 0.9 % (FLUSH) 0.9 %
5-10 SYRINGE (ML) INJECTION AS NEEDED
Status: DISCONTINUED | OUTPATIENT
Start: 2023-01-25 | End: 2023-01-26 | Stop reason: HOSPADM

## 2023-01-25 RX ORDER — SODIUM CHLORIDE 9 MG/ML
10 INJECTION INTRAVENOUS AS NEEDED
OUTPATIENT
Start: 2023-02-05

## 2023-01-25 RX ORDER — HEPARIN 100 UNIT/ML
500 SYRINGE INTRAVENOUS AS NEEDED
OUTPATIENT
Start: 2023-02-05

## 2023-01-25 RX ORDER — HEPARIN 100 UNIT/ML
500 SYRINGE INTRAVENOUS AS NEEDED
Status: DISCONTINUED | OUTPATIENT
Start: 2023-01-25 | End: 2023-01-26 | Stop reason: HOSPADM

## 2023-01-25 RX ORDER — SODIUM CHLORIDE 0.9 % (FLUSH) 0.9 %
5-10 SYRINGE (ML) INJECTION AS NEEDED
OUTPATIENT
Start: 2023-02-05

## 2023-01-25 RX ADMIN — SODIUM CHLORIDE, PRESERVATIVE FREE 10 ML: 5 INJECTION INTRAVENOUS at 15:34

## 2023-01-25 RX ADMIN — HEPARIN 500 UNITS: 100 SYRINGE at 15:34

## 2023-01-25 NOTE — PROGRESS NOTES
Chief Complaint   Patient presents with    Ear Fullness     1. \"Have you been to the ER, urgent care clinic since your last visit? Hospitalized since your last visit? \" No    2. \"Have you seen or consulted any other health care providers outside of the [address] [place] [address] since your last visit? \" No     3. For patients aged 39-70: Has the patient had a colonoscopy / FIT/ Cologuard? Yes - no Care Gap present      If the patient is female:    4. For patients aged 41-77: Has the patient had a mammogram within the past 2 years? Yes - no Care Gap present      5. For patients aged 21-65: Has the patient had a pap smear?  Yes - no Care Gap present

## 2023-01-25 NOTE — PROGRESS NOTES
[facility] OPIC Progress Note    Date: 2023    Name: Surya Worley    MRN: [id number]         : 1970    Monthly Port flush        Ms. Pham's vitals were reviewed and patient was observed for 5 minutes prior to procedure. Visit Vitals  /69 (BP 1 Location: Left upper arm, BP Patient Position: Sitting)   Pulse 72   Temp 98 °F (36.7 °C)   Resp 18   Ht 5' 4\" (1.626 m)   Wt 70.7 kg (155 lb 12.8 oz)   SpO2 99%   BMI 26.74 kg/m²       Mediport was accessed with 20 gauge, short florez(s) after chloroprep.    right chest, single    Blood return: YES    Flushed 10 of NS followed by Heparin 500u    Florez needle(s) removed. Band-Aid applied. Ms. Julito Singletary tolerated the procedure, and had no complaints. Ms. Julito Singletary was discharged from [place] [address] in stable condition at 1540. She is to return on 2023 at 1600 for her next appointment.     Sailaja Yang RN  2023  3:48 PM

## 2023-01-26 NOTE — PROGRESS NOTES
Subjective:     Emir Solorio is a 47 y.o. female who presents today with the following:  Chief Complaint   Patient presents with    Ear Fullness       Patient Active Problem List   Diagnosis Code    Headache R51.9    Contact dermatitis and other eczema, due to unspecified cause L25.9    Benign meningioma of brain ([facility] [address] D32.0    Mixed hyperlipidemia E78.2         COMPLIANT WITH MEDICATION:    Denies chest pain, dyspnea, palpitations, headache and blurred vision. Blood pressure normotensive. Ms. Angeline Sanz  endorses she was using a Q tip to clean her ear canal.  When she pulled the Q tip out the cotton remained in her ear. She denies any drainage , blood or exudate. We discussed the importance of not putting anything in your ear smaller than your elbow. This is the second time this had occurred . depression screening addressed not at risk    abuse screening addressed denies    learning assessment addressed reviewed nurses notes    fall risk addressed not at risk    HM: addressed    ROS:  Gen: denies fever, chills, fatigue, weight loss, weight gain  HEENT:denies blurry vision, nasal congestion, sore throat  Resp: denies dypsnea, cough, wheezing  CV: denies chest pain radiating to the jaws or arms, palpitations, lower extremity edema  Abd: denies nausea, vomiting, diarrhea, constipation  Neuro: denies numbness/tingling  Endo: denies polyuria, polydipsia, heat/cold intolerance  Heme: no lymphadenopathy    Allergies   Allergen Reactions    Pcn [Penicillins] Hives         Current Outpatient Medications:     rosuvastatin (CRESTOR) 10 mg tablet, TAKE 1 TABLET BY MOUTH EVERY EVENING, Disp: 90 Tablet, Rfl: 4    PARoxetine (PAXIL) 10 mg tablet, TK 1 T PO D, Disp: , Rfl:     lamoTRIgine (LaMICtal) 200 mg tablet, TK 1 T PO BID, Disp: , Rfl:     SANDOSTATIN LAR DEPOT 30 mg serr injection, by Injection route every thirty (30) days. , Disp: , Rfl:   No current facility-administered medications for this visit. Facility-Administered Medications Ordered in Other Visits:     sodium chloride (NS) flush 5-10 mL, 5-10 mL, IntraVENous, PRN, Shiela Ramuonsmiki, NP, 10 mL at 01/25/23 1534    heparin (porcine) pf 500 Units, 500 Units, IntraVENous, PRN, Shiela Devonshire, NP, 500 Units at 01/25/23 1534    Past Medical History:   Diagnosis Date    Brain tumor (benign) ([facility] [address] 2010    meningioma    Contact dermatitis and other eczema, due to unspecified cause 2015    morphoea    Headache     brain tumor/ medication    Leukemia ([facility] [address] 5    age 4-4 years old    Menopause     Seizures ([facility] [address]        Past Surgical History:   Procedure Laterality Date    COLONOSCOPY N/A 1/17/2023    COLONOSCOPY performed by Gabriella Flores MD at [address]  2016    HX ENDOSCOPY  2016    HX SKIN BIOPSY  2-2015    benign / back left spine    HX TUMOR REMOVAL  12/20/2019    Brain [facility].     [place] UNLISTED NEUROLOGICAL/NEUROMUSCULAR DX PX  6-2010    brain tumor 60% removed       Social History     Tobacco Use   Smoking Status Never   Smokeless Tobacco Never       Social History     Socioeconomic History    Marital status:    Tobacco Use    Smoking status: Never    Smokeless tobacco: Never   Vaping Use    Vaping Use: Never used   Substance and Sexual Activity    Alcohol use: No     Comment: rare    Drug use: Never     Social Determinants of Health     Financial Resource Strain: Low Risk     Difficulty of Paying Living Expenses: Not hard at all   Food Insecurity: No Food Insecurity    Worried About Running Out of Food in the Last Year: Never true    Ran Out of Food in the Last Year: Never true   Transportation Needs: No Transportation Needs    Lack of Transportation (Medical): No    Lack of Transportation (Non-Medical): No       Family History   Problem Relation Age of Onset    Asthma Mother     Breast Cancer Sister     Heart Disease Paternal Grandfather     Heart Disease Paternal Uncle          Objective:     Visit Vitals  /84 (BP 1 Location: Left upper arm, BP Patient Position: Sitting, BP Cuff Size: Adult)   Pulse 79   Temp 97.9 °F (36.6 °C) (Temporal)   Resp 18   Ht 5' 4\" (1.626 m)   Wt 155 lb (70.3 kg)   LMP 07/04/2015   SpO2 98%   BMI 26.61 kg/m²     Body mass index is 26.61 kg/m². General: Alert and oriented. No acute distress. Well nourished  HEENT :  Ears: Cotton occluding the left TM. Lying Myringotomy tube not visible. Removal atempt x 2 unsuccessful   Eyes: pupils equal, round, react to light and accommodation. Extra ocular movements intact. Nose: patent. Mouth and throat is clear. Neck:supple full range of motion no thyromegaly. Trachea midline, No carotid bruits. No significant lymphadenopathy  Lungs[de-identified] clear to auscultation without wheezes, rales, or rhonchi. Heart :RRR, S1 & S2 are normal intensity. No murmur; no gallop  Abdomen: bowel sounds active. No tenderness, guarding, rebound, masses, hepatic or spleen enlargement  Back: no CVA tenderness. Extremities: without clubbing, cyanosis, or edema  Pulses: radial and femoral pulses are normal  Neuro: HMF intact. Cranial nerves II through XII grossly normal.  Motor: is 5 over 5 and symmetrical.   Deep tendon reflexes: +2 equal  Psych:appropriate behavior, mood, affect and judgement. Results for orders placed or performed in visit on 12/12/22   BETA-HYDROXYBUTYRATE   Result Value Ref Range    B-hydroxybutyrate 0.10 <0.40 mmol/L       No results found for this visit on 01/25/23. Assessment/ Plan:     1. Sensation of fullness in left ear  Refer back to ENT      No orders of the defined types were placed in this encounter. Verbal and written instructions (see AVS) provided. Patient expresses understanding of diagnosis and treatment plan.     Health Maintenance Due   Topic Date Due    COVID-19 Vaccine (5 - Booster for Moderna series) 09/10/2022       Follow up PRITESH Luna

## 2023-02-22 ENCOUNTER — HOSPITAL ENCOUNTER (OUTPATIENT)
Dept: INFUSION THERAPY | Age: 53
Discharge: HOME | End: 2023-02-22
Payer: COMMERCIAL

## 2023-02-22 DIAGNOSIS — D32.0 BENIGN MENINGIOMA OF BRAIN (HCC): Primary | ICD-10-CM

## 2023-02-22 PROCEDURE — 74011250636 HC RX REV CODE- 250/636: Performed by: NURSE PRACTITIONER

## 2023-02-22 PROCEDURE — 96523 IRRIG DRUG DELIVERY DEVICE: CPT

## 2023-02-22 PROCEDURE — 77030012965 HC NDL HUBR BBMI -A

## 2023-02-22 PROCEDURE — 74011000250 HC RX REV CODE- 250: Performed by: NURSE PRACTITIONER

## 2023-02-22 RX ORDER — SODIUM CHLORIDE 9 MG/ML
10 INJECTION INTRAVENOUS AS NEEDED
OUTPATIENT
Start: 2023-03-08

## 2023-02-22 RX ORDER — SODIUM CHLORIDE 0.9 % (FLUSH) 0.9 %
5-10 SYRINGE (ML) INJECTION AS NEEDED
Status: DISCONTINUED | OUTPATIENT
Start: 2023-02-22 | End: 2023-02-23 | Stop reason: HOSPADM

## 2023-02-22 RX ORDER — SODIUM CHLORIDE 0.9 % (FLUSH) 0.9 %
5-10 SYRINGE (ML) INJECTION AS NEEDED
OUTPATIENT
Start: 2023-03-08

## 2023-02-22 RX ORDER — HEPARIN 100 UNIT/ML
500 SYRINGE INTRAVENOUS AS NEEDED
Status: DISCONTINUED | OUTPATIENT
Start: 2023-02-22 | End: 2023-02-23 | Stop reason: HOSPADM

## 2023-02-22 RX ORDER — SODIUM CHLORIDE 9 MG/ML
10 INJECTION INTRAVENOUS AS NEEDED
Status: DISCONTINUED | OUTPATIENT
Start: 2023-02-22 | End: 2023-02-23 | Stop reason: HOSPADM

## 2023-02-22 RX ORDER — HEPARIN 100 UNIT/ML
500 SYRINGE INTRAVENOUS AS NEEDED
OUTPATIENT
Start: 2023-03-08

## 2023-02-22 RX ADMIN — SODIUM CHLORIDE, PRESERVATIVE FREE 10 ML: 5 INJECTION INTRAVENOUS at 16:15

## 2023-02-22 RX ADMIN — HEPARIN 500 UNITS: 100 SYRINGE at 16:15

## 2023-02-22 NOTE — PROGRESS NOTES
[facility] OPIC Progress Note    Date: 2023    Name: Geo Shook    MRN: [id number]         : 1970    Monthly Port flush     Ms. Roverto Lopez was assessed and education was provided. No other concerns noted. Ms. Pham's vitals were reviewed and patient was observed for 5 minutes prior to procedure. There were no vitals taken for this visit. Mediport was accessed with 20 gauge, short florez after chloroprep.    right chest, single    Blood return: YES    Flushed 10 of NS followed by Heparin 500u    Florez needle removed. Band-Aid applied to site without redness, swelling or pain. Ms. Rvoerto Lopez tolerated the procedure, and had no complaints. Ms. Roverto Lopez was discharged from [place] [address] in stable condition at [phone]. She is to return on  at 1600 for her next appointment.     Alok Brown RN  2023  4:23 PM

## 2023-02-23 ENCOUNTER — CLINICAL SUPPORT (OUTPATIENT)
Dept: FAMILY MEDICINE CLINIC | Age: 53
End: 2023-02-23
Payer: COMMERCIAL

## 2023-02-23 DIAGNOSIS — D32.0 BENIGN MENINGIOMA OF BRAIN (HCC): Primary | ICD-10-CM

## 2023-02-23 PROCEDURE — 96372 THER/PROPH/DIAG INJ SC/IM: CPT | Performed by: NURSE PRACTITIONER

## 2023-02-23 NOTE — PROGRESS NOTES
Sandostatin LAR [place] injection given in R hip per Kylie Nicole [facility]#[id number] Exp: 04-

## 2023-03-22 ENCOUNTER — HOSPITAL ENCOUNTER (OUTPATIENT)
Dept: INFUSION THERAPY | Age: 53
Discharge: HOME | End: 2023-03-22
Payer: COMMERCIAL

## 2023-03-22 VITALS
WEIGHT: 154.6 LBS | HEART RATE: 74 BPM | SYSTOLIC BLOOD PRESSURE: 112 MMHG | DIASTOLIC BLOOD PRESSURE: 69 MMHG | TEMPERATURE: 98.2 F | BODY MASS INDEX: 26.54 KG/M2 | RESPIRATION RATE: 18 BRPM

## 2023-03-22 DIAGNOSIS — D32.0 BENIGN MENINGIOMA OF BRAIN (HCC): Primary | ICD-10-CM

## 2023-03-22 PROCEDURE — 74011250636 HC RX REV CODE- 250/636: Performed by: NURSE PRACTITIONER

## 2023-03-22 PROCEDURE — 77030012965 HC NDL HUBR BBMI -A

## 2023-03-22 PROCEDURE — 74011000250 HC RX REV CODE- 250: Performed by: NURSE PRACTITIONER

## 2023-03-22 PROCEDURE — 96523 IRRIG DRUG DELIVERY DEVICE: CPT

## 2023-03-22 RX ORDER — SODIUM CHLORIDE 0.9 % (FLUSH) 0.9 %
5-10 SYRINGE (ML) INJECTION AS NEEDED
Status: DISCONTINUED | OUTPATIENT
Start: 2023-03-22 | End: 2023-03-23 | Stop reason: HOSPADM

## 2023-03-22 RX ORDER — SODIUM CHLORIDE 9 MG/ML
10 INJECTION INTRAVENOUS AS NEEDED
Status: DISCONTINUED | OUTPATIENT
Start: 2023-03-22 | End: 2023-03-23 | Stop reason: HOSPADM

## 2023-03-22 RX ORDER — SODIUM CHLORIDE 9 MG/ML
10 INJECTION INTRAVENOUS AS NEEDED
OUTPATIENT
Start: 2023-04-05

## 2023-03-22 RX ORDER — HEPARIN 100 UNIT/ML
500 SYRINGE INTRAVENOUS AS NEEDED
OUTPATIENT
Start: 2023-04-05

## 2023-03-22 RX ORDER — SODIUM CHLORIDE 0.9 % (FLUSH) 0.9 %
5-10 SYRINGE (ML) INJECTION AS NEEDED
OUTPATIENT
Start: 2023-04-05

## 2023-03-22 RX ORDER — HEPARIN 100 UNIT/ML
500 SYRINGE INTRAVENOUS AS NEEDED
Status: DISCONTINUED | OUTPATIENT
Start: 2023-03-22 | End: 2023-03-23 | Stop reason: HOSPADM

## 2023-03-22 RX ADMIN — SODIUM CHLORIDE, PRESERVATIVE FREE 10 ML: 5 INJECTION INTRAVENOUS at 16:22

## 2023-03-22 RX ADMIN — Medication 500 UNITS: at 16:23

## 2023-03-22 NOTE — PROGRESS NOTES
\[facility]\"" OPIC Progress Note    Date: 2023    Name: José Shelley    MRN: [id number]         : 1970    Monthly Port flush     Ms. Ferny Calle was assessed and education was provided. She denies new problems. Ms. Pham's vitals were reviewed and patient was observed for 5 minutes prior to procedure. Visit Vitals  /69 (BP 1 Location: Left upper arm, BP Patient Position: Sitting)   Pulse 74   Temp 98.2 °F (36.8 °C)   Resp 18   Wt 70.1 kg (154 lb 9.6 oz)   BMI 26.54 kg/m²       Mediport was accessed with 20 gauge, 1 inch florez after chloroprep.    right chest, single  Blood return: YES      Flushed 10 ml of NS followed by Heparin 500u    Florez needle removed and site intact. Band-Aid applied. Ms. Ferny Calle tolerated the procedure, and had no complaints. Ms. Ferny Calle was discharged from [place] [address] in stable condition at 1625. She is to return on  at 4 pm for her next appointment.     Felecia Schwab, RN  2023  4:45 PM

## 2023-03-27 ENCOUNTER — CLINICAL SUPPORT (OUTPATIENT)
Dept: FAMILY MEDICINE CLINIC | Age: 53
End: 2023-03-27
Payer: COMMERCIAL

## 2023-03-27 DIAGNOSIS — D32.0 BENIGN MENINGIOMA OF BRAIN (HCC): Primary | ICD-10-CM

## 2023-03-27 PROCEDURE — 96372 THER/PROPH/DIAG INJ SC/IM: CPT | Performed by: NURSE PRACTITIONER

## 2023-04-20 DIAGNOSIS — E78.5 HYPERLIPIDEMIA, UNSPECIFIED HYPERLIPIDEMIA TYPE: ICD-10-CM

## 2023-04-20 RX ORDER — ROSUVASTATIN CALCIUM 10 MG/1
10 TABLET, COATED ORAL EVERY EVENING
Qty: 90 TABLET | Refills: 4 | Status: SHIPPED | OUTPATIENT
Start: 2023-04-20

## 2023-04-24 ENCOUNTER — CLINICAL SUPPORT (OUTPATIENT)
Dept: FAMILY MEDICINE CLINIC | Age: 53
End: 2023-04-24

## 2023-04-24 ENCOUNTER — HOSPITAL ENCOUNTER (OUTPATIENT)
Dept: INFUSION THERAPY | Age: 53
Discharge: HOME | End: 2023-04-24
Payer: COMMERCIAL

## 2023-04-24 DIAGNOSIS — D32.0 BENIGN MENINGIOMA OF BRAIN (HCC): Primary | ICD-10-CM

## 2023-04-24 PROCEDURE — 77030012965 HC NDL HUBR BBMI -A

## 2023-04-24 PROCEDURE — 96523 IRRIG DRUG DELIVERY DEVICE: CPT

## 2023-04-24 PROCEDURE — 74011250636 HC RX REV CODE- 250/636: Performed by: NURSE PRACTITIONER

## 2023-04-24 PROCEDURE — 74011000250 HC RX REV CODE- 250: Performed by: NURSE PRACTITIONER

## 2023-04-24 RX ORDER — SODIUM CHLORIDE 0.9 % (FLUSH) 0.9 %
5-10 SYRINGE (ML) INJECTION AS NEEDED
Status: DISPENSED | OUTPATIENT
Start: 2023-04-24 | End: 2023-04-24

## 2023-04-24 RX ORDER — SODIUM CHLORIDE 0.9 % (FLUSH) 0.9 %
5-10 SYRINGE (ML) INJECTION AS NEEDED
OUTPATIENT
Start: 2023-05-22

## 2023-04-24 RX ORDER — SODIUM CHLORIDE 9 MG/ML
10 INJECTION INTRAVENOUS AS NEEDED
Status: ACTIVE | OUTPATIENT
Start: 2023-04-24 | End: 2023-04-24

## 2023-04-24 RX ORDER — HEPARIN 100 UNIT/ML
500 SYRINGE INTRAVENOUS AS NEEDED
OUTPATIENT
Start: 2023-05-22

## 2023-04-24 RX ORDER — HEPARIN 100 UNIT/ML
500 SYRINGE INTRAVENOUS AS NEEDED
Status: DISPENSED | OUTPATIENT
Start: 2023-04-24 | End: 2023-04-24

## 2023-04-24 RX ORDER — SODIUM CHLORIDE 9 MG/ML
10 INJECTION INTRAVENOUS AS NEEDED
OUTPATIENT
Start: 2023-05-22

## 2023-04-24 RX ADMIN — SODIUM CHLORIDE, PRESERVATIVE FREE 10 ML: 5 INJECTION INTRAVENOUS at 09:34

## 2023-04-24 RX ADMIN — HEPARIN 500 UNITS: 100 SYRINGE at 09:34

## 2023-04-24 NOTE — PROGRESS NOTES
[facility] Progress Note    Date: 2023    Name: Ariane Galan    MRN: [id number]         : 1970    Monthly Port flush     Ms. Lerry Riedel was assessed and education was provided. No other concerns noted. Ms. Pham's vitals were reviewed and patient was observed for 5 minutes prior to procedure. There were no vitals taken for this visit. Mediport was accessed with 20 gauge, short florez after chloroprep.    right chest, single  Blood return: YES    Flushed 10 of NS followed by Heparin 500u    Florez needle removed. Band-Aid applied to site without redness, swelling or pain. Ms. Lerry Riedel tolerated the procedure, and had no complaints. Ms. Lerry Riedel was discharged from [place] [address] in stable condition at [address] She is to return on May 22 at  for her next appointment.     Kenny Ferris RN  2023  9:49 AM

## 2023-04-24 NOTE — PROGRESS NOTES
SandostatinLARDepot 30mg injection given in R hip per Dillon Russell [facility]#[id number] Exp:06/30/2025

## 2023-05-24 ENCOUNTER — HOSPITAL ENCOUNTER (OUTPATIENT)
Facility: HOSPITAL | Age: 53
Setting detail: INFUSION SERIES
End: 2023-05-24
Payer: COMMERCIAL

## 2023-05-24 VITALS
DIASTOLIC BLOOD PRESSURE: 80 MMHG | SYSTOLIC BLOOD PRESSURE: 138 MMHG | RESPIRATION RATE: 18 BRPM | TEMPERATURE: 98 F | HEART RATE: 67 BPM | WEIGHT: 154.8 LBS | BODY MASS INDEX: 26.57 KG/M2

## 2023-05-24 PROCEDURE — 6360000002 HC RX W HCPCS

## 2023-05-24 PROCEDURE — 96523 IRRIG DRUG DELIVERY DEVICE: CPT

## 2023-05-24 PROCEDURE — 2580000003 HC RX 258

## 2023-05-24 RX ORDER — SODIUM CHLORIDE 0.9 % (FLUSH) 0.9 %
5-40 SYRINGE (ML) INJECTION 2 TIMES DAILY
Status: DISCONTINUED | OUTPATIENT
Start: 2023-05-24 | End: 2023-10-28 | Stop reason: HOSPADM

## 2023-05-24 RX ORDER — HEPARIN SODIUM (PORCINE) LOCK FLUSH IV SOLN 100 UNIT/ML 100 UNIT/ML
100 SOLUTION INTRAVENOUS
Status: DISCONTINUED | OUTPATIENT
Start: 2023-05-24 | End: 2023-10-28 | Stop reason: HOSPADM

## 2023-05-24 RX ORDER — HEPARIN SODIUM (PORCINE) LOCK FLUSH IV SOLN 100 UNIT/ML 100 UNIT/ML
500 SOLUTION INTRAVENOUS
Status: COMPLETED | OUTPATIENT
Start: 2023-05-24 | End: 2023-05-24

## 2023-05-24 RX ADMIN — HEPARIN 500 UNITS: 100 SYRINGE at 08:50

## 2023-05-24 RX ADMIN — SODIUM CHLORIDE, PRESERVATIVE FREE 10 ML: 5 INJECTION INTRAVENOUS at 08:50

## 2023-05-24 NOTE — PROGRESS NOTES
[facility] OPIC Progress Note    Date: May 24, 2023    Name: Katheryn Contreras    MRN: [id number]         : 1970    Monthly Port flush     Ms. Valeriano Sharp was assessed and education was provided. She denies new problems. Ms. Stephani Castañeda vitals were reviewed. Vitals:    23 0845   BP: 138/80   Pulse: 67   Resp: 18   Temp: 98 °F (36.7 °C)       Mediport was accessed with 20 gauge 1 inch vela after chloroprep.    right chest, single  Blood return: yes    Flushed 10 ml of NS followed by Heparin 500u    Vela needle removed and site intact. Gauze and tape applied. Ms. Valeriano Sharp tolerated the procedure, and had no complaints. Patient armband removed and shredded. Ms. Valeriano Sharp was discharged from [place] [address] in stable condition at [phone]. She is to return on  at 0830 for her next appointment.      Nasir Hameed RN  May 24, 2023  9:09 AM

## 2023-05-26 ENCOUNTER — NURSE ONLY (OUTPATIENT)
Age: 53
End: 2023-05-26

## 2023-05-26 DIAGNOSIS — D32.0 BENIGN NEOPLASM OF CEREBRAL MENINGES (HCC): Primary | ICD-10-CM

## 2023-06-26 ENCOUNTER — NURSE ONLY (OUTPATIENT)
Age: 53
End: 2023-06-26

## 2023-06-26 DIAGNOSIS — D32.0 BENIGN NEOPLASM OF CEREBRAL MENINGES (HCC): Primary | ICD-10-CM

## 2023-07-25 ENCOUNTER — NURSE ONLY (OUTPATIENT)
Age: 53
End: 2023-07-25

## 2023-07-25 DIAGNOSIS — D32.0 BENIGN NEOPLASM OF CEREBRAL MENINGES (HCC): Primary | ICD-10-CM

## 2023-07-25 NOTE — PROGRESS NOTES
Sandostatin LAR Depot injection given in L hip per Mike Sylvester. [facility]#[id number] VKA:70- Patient tolerated well.

## 2023-08-02 ENCOUNTER — HOSPITAL ENCOUNTER (OUTPATIENT)
Facility: HOSPITAL | Age: 53
Setting detail: INFUSION SERIES
End: 2023-08-02

## 2023-08-03 ENCOUNTER — HOSPITAL ENCOUNTER (OUTPATIENT)
Facility: HOSPITAL | Age: 53
Setting detail: INFUSION SERIES
End: 2023-08-03
Payer: COMMERCIAL

## 2023-08-03 PROCEDURE — 96523 IRRIG DRUG DELIVERY DEVICE: CPT

## 2023-08-03 PROCEDURE — 2580000003 HC RX 258

## 2023-08-03 RX ORDER — SODIUM CHLORIDE 0.9 % (FLUSH) 0.9 %
5-40 SYRINGE (ML) INJECTION ONCE
Status: COMPLETED | OUTPATIENT
Start: 2023-08-03 | End: 2023-08-03

## 2023-08-03 RX ADMIN — SODIUM CHLORIDE, PRESERVATIVE FREE 20 ML: 5 INJECTION INTRAVENOUS at 08:59

## 2023-08-03 ASSESSMENT — PAIN SCALES - GENERAL: PAINLEVEL_OUTOF10: 0

## 2023-08-03 NOTE — PROGRESS NOTES
[facility] Progress Note    Date: August 3, 2023    Name: Jane Chiang    MRN: [id number]         : 1970    Monthly Port flush     Ms. Montserrat Marroquin was assessed and education was provided. No other concerns noted. Mediport was accessed with 20 gauge, 1 inch vela(s) after chloroprep.    right chest, single. Blood return: yes      Flushed 20 of NS followed. Vela needle(s) removed. Band-Aid applied. Ms. Montserrat Marroquin tolerated the procedure, and had no complaints. Ms. Montserrat Marroquin was discharged from [address] [place] [address] in stable condition at 0900. She is to return on 23 at 0830 for her next appointment.     Danuta Richards RN  August 3, 2023  9:51 AM

## 2023-08-22 ENCOUNTER — NURSE ONLY (OUTPATIENT)
Age: 53
End: 2023-08-22

## 2023-08-22 DIAGNOSIS — D32.0 BENIGN NEOPLASM OF CEREBRAL MENINGES (HCC): Primary | ICD-10-CM

## 2023-08-22 NOTE — PROGRESS NOTES
Sandostatin LAR Depot injection given in R hip per Yuko TORRES#[id number] Exp: 11-. Patient tolerated well.

## 2023-09-11 ENCOUNTER — TRANSCRIBE ORDERS (OUTPATIENT)
Facility: HOSPITAL | Age: 53
End: 2023-09-11

## 2023-09-11 ENCOUNTER — HOSPITAL ENCOUNTER (OUTPATIENT)
Facility: HOSPITAL | Age: 53
Setting detail: INFUSION SERIES
End: 2023-09-11
Payer: COMMERCIAL

## 2023-09-11 VITALS
BODY MASS INDEX: 26.37 KG/M2 | SYSTOLIC BLOOD PRESSURE: 115 MMHG | TEMPERATURE: 98 F | HEART RATE: 71 BPM | WEIGHT: 153.6 LBS | RESPIRATION RATE: 16 BRPM | OXYGEN SATURATION: 96 % | DIASTOLIC BLOOD PRESSURE: 71 MMHG

## 2023-09-11 DIAGNOSIS — Z12.31 SCREENING MAMMOGRAM FOR BREAST CANCER: Primary | ICD-10-CM

## 2023-09-11 PROCEDURE — 96523 IRRIG DRUG DELIVERY DEVICE: CPT

## 2023-09-21 ENCOUNTER — HOSPITAL ENCOUNTER (OUTPATIENT)
Facility: HOSPITAL | Age: 53
Discharge: HOME | End: 2023-09-21
Payer: COMMERCIAL

## 2023-09-21 ENCOUNTER — NURSE ONLY (OUTPATIENT)
Age: 53
End: 2023-09-21

## 2023-09-21 DIAGNOSIS — D32.0 BENIGN NEOPLASM OF CEREBRAL MENINGES (HCC): Primary | ICD-10-CM

## 2023-09-21 DIAGNOSIS — Z12.31 SCREENING MAMMOGRAM FOR BREAST CANCER: ICD-10-CM

## 2023-09-21 PROCEDURE — 77063 BREAST TOMOSYNTHESIS BI: CPT

## 2023-09-21 NOTE — PROGRESS NOTES
Sandostatin LAR Depot injection given in L hip per Daiana MEREDITH#[id number] Exp:11/2025 Patient tolerated well.

## 2023-10-09 ENCOUNTER — HOSPITAL ENCOUNTER (OUTPATIENT)
Facility: HOSPITAL | Age: 53
Discharge: HOME | End: 2023-10-12
Payer: COMMERCIAL

## 2023-10-09 ENCOUNTER — HOSPITAL ENCOUNTER (OUTPATIENT)
Facility: HOSPITAL | Age: 53
End: 2023-10-09
Payer: COMMERCIAL

## 2023-10-09 ENCOUNTER — HOSPITAL ENCOUNTER (OUTPATIENT)
Facility: HOSPITAL | Age: 53
Setting detail: INFUSION SERIES
End: 2023-10-09
Payer: COMMERCIAL

## 2023-10-09 VITALS
RESPIRATION RATE: 16 BRPM | TEMPERATURE: 98.1 F | WEIGHT: 155 LBS | DIASTOLIC BLOOD PRESSURE: 77 MMHG | HEART RATE: 89 BPM | BODY MASS INDEX: 26.61 KG/M2 | SYSTOLIC BLOOD PRESSURE: 113 MMHG | OXYGEN SATURATION: 95 %

## 2023-10-09 DIAGNOSIS — R92.8 ABNORMAL MAMMOGRAM: ICD-10-CM

## 2023-10-09 PROCEDURE — 96523 IRRIG DRUG DELIVERY DEVICE: CPT

## 2023-10-09 PROCEDURE — G0279 TOMOSYNTHESIS, MAMMO: HCPCS

## 2023-10-20 ENCOUNTER — NURSE ONLY (OUTPATIENT)
Age: 53
End: 2023-10-20

## 2023-10-20 DIAGNOSIS — D32.0 BENIGN NEOPLASM OF CEREBRAL MENINGES (HCC): Primary | ICD-10-CM

## 2023-11-06 ENCOUNTER — HOSPITAL ENCOUNTER (OUTPATIENT)
Facility: HOSPITAL | Age: 53
Setting detail: INFUSION SERIES
End: 2023-11-06

## 2023-11-09 ENCOUNTER — HOSPITAL ENCOUNTER (OUTPATIENT)
Facility: HOSPITAL | Age: 53
Setting detail: INFUSION SERIES
Discharge: HOME | End: 2023-11-09
Payer: COMMERCIAL

## 2023-11-09 PROCEDURE — 96523 IRRIG DRUG DELIVERY DEVICE: CPT

## 2023-11-09 NOTE — PROGRESS NOTES
[facility] Progress Note    Date: 2023    Name: Howell Sacks    MRN: [id number]         : 1970    Monthly Port flush     Ms. Brianna Rogel was assessed and education was provided. No other concerns noted. Ms. Santizo's vitals were reviewed and patient was observed for 5 minutes prior to procedure. There were no vitals filed for this visit. Mediport was accessed with 20 gauge, 1 inch   vela(s) after chloroprep.    right chest, single/double    Blood return: yes        Flushed 20 of NS. Vela needle(s) removed. Band-Aid applied. Ms. Brianna Rogel tolerated the procedure, and had no complaints. Ms. Brianna Rogel was discharged from [address] [place] [address] in stable condition at 3:19. She is to return on 23 at 3:30 for her next appointment.     Yury Pool RN  2023  3:18 PM

## 2023-11-17 ENCOUNTER — NURSE ONLY (OUTPATIENT)
Age: 53
End: 2023-11-17

## 2023-11-17 VITALS — TEMPERATURE: 97.7 F

## 2023-11-17 DIAGNOSIS — D32.0 BENIGN NEOPLASM OF CEREBRAL MENINGES (HCC): Primary | ICD-10-CM

## 2023-12-14 ENCOUNTER — HOSPITAL ENCOUNTER (OUTPATIENT)
Facility: HOSPITAL | Age: 53
Setting detail: INFUSION SERIES
Discharge: HOME | End: 2023-12-14
Payer: COMMERCIAL

## 2023-12-14 PROCEDURE — 96523 IRRIG DRUG DELIVERY DEVICE: CPT

## 2023-12-14 PROCEDURE — 2580000003 HC RX 258

## 2023-12-14 RX ORDER — SODIUM CHLORIDE 0.9 % (FLUSH) 0.9 %
5-40 SYRINGE (ML) INJECTION 2 TIMES DAILY
Status: DISCONTINUED | OUTPATIENT
Start: 2023-12-14 | End: 2023-12-15 | Stop reason: HOSPADM

## 2023-12-14 RX ADMIN — SODIUM CHLORIDE, PRESERVATIVE FREE 20 ML: 5 INJECTION INTRAVENOUS at 15:24

## 2023-12-14 NOTE — PROGRESS NOTES
Pt arrived ambulatory and reported no new concerns. Port a cath accessed using sterile procedure, brisk blood return, flushed with 20 mL NS. Needle removed and band-aid applied to site without redness, swelling or pain.

## 2024-02-05 ENCOUNTER — HOSPITAL ENCOUNTER (OUTPATIENT)
Facility: HOSPITAL | Age: 54
Setting detail: INFUSION SERIES
Discharge: HOME | End: 2024-02-05
Payer: MEDICARE

## 2024-02-05 PROCEDURE — 96523 IRRIG DRUG DELIVERY DEVICE: CPT

## 2024-02-05 PROCEDURE — 2580000003 HC RX 258

## 2024-02-05 RX ORDER — SODIUM CHLORIDE 0.9 % (FLUSH) 0.9 %
5-40 SYRINGE (ML) INJECTION 2 TIMES DAILY PRN
Status: DISCONTINUED | OUTPATIENT
Start: 2024-02-05 | End: 2024-02-06 | Stop reason: HOSPADM

## 2024-02-05 RX ORDER — SODIUM CHLORIDE 0.9 % (FLUSH) 0.9 %
5-40 SYRINGE (ML) INJECTION 2 TIMES DAILY
Status: DISCONTINUED | OUTPATIENT
Start: 2024-02-05 | End: 2024-02-06 | Stop reason: HOSPADM

## 2024-02-05 RX ADMIN — SODIUM CHLORIDE, PRESERVATIVE FREE 20 ML: 5 INJECTION INTRAVENOUS at 15:15

## 2024-02-05 NOTE — PROGRESS NOTES
Pt arrived ambulatory and in no distress for monthly port flush. Port accessed using sterile technique, brisk blood return. Flushed with 20 mL NS and removed from site without redness, swelling or pain. Band-aid applied. Pt will return on 3/18/24 at 1500.

## 2024-03-18 ENCOUNTER — HOSPITAL ENCOUNTER (OUTPATIENT)
Facility: HOSPITAL | Age: 54
Setting detail: INFUSION SERIES
End: 2024-03-18

## 2024-03-25 ENCOUNTER — HOSPITAL ENCOUNTER (OUTPATIENT)
Facility: HOSPITAL | Age: 54
Setting detail: INFUSION SERIES
Discharge: HOME | End: 2024-03-25
Payer: MEDICARE

## 2024-03-25 VITALS
WEIGHT: 153 LBS | OXYGEN SATURATION: 97 % | BODY MASS INDEX: 26.26 KG/M2 | RESPIRATION RATE: 16 BRPM | HEART RATE: 98 BPM | SYSTOLIC BLOOD PRESSURE: 100 MMHG | DIASTOLIC BLOOD PRESSURE: 67 MMHG | TEMPERATURE: 98.2 F

## 2024-03-25 PROCEDURE — 96523 IRRIG DRUG DELIVERY DEVICE: CPT

## 2024-03-25 PROCEDURE — 2580000003 HC RX 258

## 2024-03-25 RX ORDER — SODIUM CHLORIDE 0.9 % (FLUSH) 0.9 %
5-40 SYRINGE (ML) INJECTION 2 TIMES DAILY
Status: DISCONTINUED | OUTPATIENT
Start: 2024-03-25 | End: 2024-03-26 | Stop reason: HOSPADM

## 2024-03-25 RX ADMIN — SODIUM CHLORIDE, PRESERVATIVE FREE 10 ML: 5 INJECTION INTRAVENOUS at 15:50

## 2024-03-25 NOTE — PROGRESS NOTES
[facility] Progress Note    Date: 2024    Name: Marva Santizo    MRN: [id number]         : 1970    Monthly Port flush     Ms. Santizo was assessed.She is doing well and has no complaints.     Ms. Santizo's vitals were reviewed and patient was observed for 5 minutes prior to procedure.  Vitals:    24 1545   BP: 100/67   Pulse: 98   Resp: 16   Temp: 98.2 °F (36.8 °C)   SpO2: 97%       Mediport was accessed with 20 gauge, short cheek after chloroprep.    right chest, single    Flushed 10 of NS     Cheek needle removed. Band-Aid applied.    Ms. Santizo tolerated the procedure, and had no complaints.    [facility] at 1555. She is to return on 24 at 1530 for her next appointment.    STEVE CHRISTIANSON RN  2024  3:55 PM

## 2024-04-12 RX ORDER — ROSUVASTATIN CALCIUM 10 MG/1
10 TABLET, COATED ORAL DAILY
Qty: 90 TABLET | Refills: 3 | Status: SHIPPED | OUTPATIENT
Start: 2024-04-12

## 2024-04-12 NOTE — TELEPHONE ENCOUNTER
Looks like patient has not been seen in office since 2022 by provider but has had numerous nurse visits.  Patient requesting refill.  Please advise.    Requested Prescriptions     Pending Prescriptions Disp Refills    rosuvastatin (CRESTOR) 10 MG tablet [Pharmacy Med Name: ROSUVASTATIN 10MG TAB] 90 tablet 0

## 2024-05-06 RX ORDER — ROSUVASTATIN CALCIUM 10 MG/1
10 TABLET, COATED ORAL EVERY EVENING
Qty: 90 TABLET | Refills: 3 | Status: SHIPPED | OUTPATIENT
Start: 2024-05-06

## 2024-06-14 ENCOUNTER — HOSPITAL ENCOUNTER (OUTPATIENT)
Facility: HOSPITAL | Age: 54
Setting detail: INFUSION SERIES
Discharge: HOME | End: 2024-06-14
Payer: MEDICARE

## 2024-06-14 VITALS
OXYGEN SATURATION: 97 % | RESPIRATION RATE: 16 BRPM | WEIGHT: 156 LBS | DIASTOLIC BLOOD PRESSURE: 72 MMHG | HEART RATE: 80 BPM | BODY MASS INDEX: 26.78 KG/M2 | TEMPERATURE: 98.4 F | SYSTOLIC BLOOD PRESSURE: 104 MMHG

## 2024-06-14 PROCEDURE — 96523 IRRIG DRUG DELIVERY DEVICE: CPT

## 2024-06-14 RX ORDER — SODIUM CHLORIDE 0.9 % (FLUSH) 0.9 %
5-40 SYRINGE (ML) INJECTION 2 TIMES DAILY
Status: DISCONTINUED | OUTPATIENT
Start: 2024-06-14 | End: 2024-06-15 | Stop reason: HOSPADM

## 2024-06-14 ASSESSMENT — PAIN SCALES - GENERAL: PAINLEVEL_OUTOF10: 0

## 2024-06-14 NOTE — PROGRESS NOTES
[facility] Progress Note    Date: 2024    Name: Marva Santizo    MRN: [id number]         : 1970    Monthly Port flush     Ms. Santizo was assessed and education was provided. No other concerns noted.    Ms. Santizo's vitals were reviewed and patient was observed for 5 minutes prior to procedure.  Vitals:    24 1520   BP: 104/72   Pulse: 80   Resp: 16   Temp: 98.4 °F (36.9 °C)   SpO2: 97%       Mediport was accessed with 20 gauge, short cheek(s) after chloroprep.    right chest, single    Blood return: yes    Flushed 20 of NS     Cheek needle(s) removed. Band-Aid applied.    Ms. Santizo tolerated the procedure, and had no complaints.    [facility]. Santizo was [facility] in [facility] condition at 1533. She is to return on 24 at 1530 for her next appointment.    Kena Wilson RN  2024  3:47 PM

## 2024-07-12 ENCOUNTER — HOSPITAL ENCOUNTER (OUTPATIENT)
Facility: HOSPITAL | Age: 54
Setting detail: INFUSION SERIES
End: 2024-07-12

## 2024-07-24 ENCOUNTER — HOSPITAL ENCOUNTER (OUTPATIENT)
Facility: HOSPITAL | Age: 54
Setting detail: INFUSION SERIES
Discharge: HOME | End: 2024-07-24
Payer: MEDICARE

## 2024-07-24 VITALS
WEIGHT: 154.6 LBS | HEART RATE: 68 BPM | RESPIRATION RATE: 17 BRPM | DIASTOLIC BLOOD PRESSURE: 71 MMHG | SYSTOLIC BLOOD PRESSURE: 116 MMHG | BODY MASS INDEX: 26.54 KG/M2 | TEMPERATURE: 98.4 F

## 2024-07-24 PROCEDURE — 2580000003 HC RX 258

## 2024-07-24 PROCEDURE — 96523 IRRIG DRUG DELIVERY DEVICE: CPT

## 2024-07-24 RX ORDER — SODIUM CHLORIDE 0.9 % (FLUSH) 0.9 %
5-40 SYRINGE (ML) INJECTION 2 TIMES DAILY
Status: DISCONTINUED | OUTPATIENT
Start: 2024-07-24 | End: 2024-07-25 | Stop reason: HOSPADM

## 2024-07-24 RX ADMIN — SODIUM CHLORIDE, PRESERVATIVE FREE 20 ML: 5 INJECTION INTRAVENOUS at 10:34

## 2024-07-24 NOTE — PROGRESS NOTES
[facility] Progress Note    Date: 2024    Name: Marva Santizo    MRN: [id number]         : 1970    Monthly Port flush       Ms. Santizo's vitals were reviewed.  Vitals:    24 1030   BP: 116/71   Pulse: 68   Resp: 17   Temp: 98.4 °F (36.9 °C)       Mediport was accessed with 20 gauge short cheek(s) after chloroprep.    right chest, single    Blood return: YES      Flushed 20 of NS     Cheek needle(s) removed. Band-Aid applied.    Ms. Santizo tolerated the procedure, and had no complaints. Patient armband removed and shredded.    Ms. Santizo was [facility] in stable condition at 1045. She is to return on 2024 at 1430 for her next appointment.    Lorena Frost RN  2024  11:17 AM

## 2024-09-13 ENCOUNTER — HOSPITAL ENCOUNTER (OUTPATIENT)
Facility: HOSPITAL | Age: 54
Setting detail: INFUSION SERIES
Discharge: HOME | End: 2024-09-13
Payer: MEDICARE

## 2024-09-13 VITALS
BODY MASS INDEX: 26.37 KG/M2 | TEMPERATURE: 98.5 F | WEIGHT: 153.6 LBS | DIASTOLIC BLOOD PRESSURE: 67 MMHG | RESPIRATION RATE: 16 BRPM | SYSTOLIC BLOOD PRESSURE: 117 MMHG | OXYGEN SATURATION: 97 % | HEART RATE: 77 BPM

## 2024-09-13 PROCEDURE — 96523 IRRIG DRUG DELIVERY DEVICE: CPT

## 2024-09-13 PROCEDURE — 2580000003 HC RX 258: Performed by: PSYCHIATRY & NEUROLOGY

## 2024-09-13 RX ORDER — SODIUM CHLORIDE 0.9 % (FLUSH) 0.9 %
5-40 SYRINGE (ML) INJECTION 2 TIMES DAILY
Status: DISCONTINUED | OUTPATIENT
Start: 2024-09-13 | End: 2024-09-14 | Stop reason: HOSPADM

## 2024-09-13 RX ADMIN — SODIUM CHLORIDE, PRESERVATIVE FREE 10 ML: 5 INJECTION INTRAVENOUS at 15:56

## 2024-10-07 ENCOUNTER — TRANSCRIBE ORDERS (OUTPATIENT)
Facility: HOSPITAL | Age: 54
End: 2024-10-07

## 2024-10-07 DIAGNOSIS — Z12.31 SCREENING MAMMOGRAM FOR BREAST CANCER: Primary | ICD-10-CM

## 2024-10-15 ENCOUNTER — NURSE ONLY (OUTPATIENT)
Age: 54
End: 2024-10-15

## 2024-10-15 VITALS — TEMPERATURE: 97.9 F

## 2024-10-15 DIAGNOSIS — Z23 NEEDS FLU SHOT: Primary | ICD-10-CM

## 2024-10-15 PROCEDURE — G0008 ADMIN INFLUENZA VIRUS VAC: HCPCS | Performed by: NURSE PRACTITIONER

## 2024-10-15 PROCEDURE — 90661 CCIIV3 VAC ABX FR 0.5 ML IM: CPT | Performed by: NURSE PRACTITIONER

## 2024-10-15 NOTE — PROGRESS NOTES
Patient was administered Flu shot in Lt Deltoid via IM per Hermila's orders, tolerated well.  pt given copy of AVS and  to resume with any routine medications.  The VIIS information was given and reviewed with pt, states understanding at this time.

## 2024-11-01 ENCOUNTER — APPOINTMENT (OUTPATIENT)
Facility: HOSPITAL | Age: 54
End: 2024-11-01
Payer: MEDICARE

## 2024-11-08 ENCOUNTER — HOSPITAL ENCOUNTER (OUTPATIENT)
Facility: HOSPITAL | Age: 54
Setting detail: INFUSION SERIES
Discharge: HOME | End: 2024-11-08
Payer: MEDICARE

## 2024-11-08 ENCOUNTER — HOSPITAL ENCOUNTER (OUTPATIENT)
Facility: HOSPITAL | Age: 54
Discharge: HOME | End: 2024-11-11
Payer: MEDICARE

## 2024-11-08 DIAGNOSIS — Z12.31 SCREENING MAMMOGRAM FOR BREAST CANCER: ICD-10-CM

## 2024-11-08 PROCEDURE — 77067 SCR MAMMO BI INCL CAD: CPT

## 2024-11-08 PROCEDURE — 2580000003 HC RX 258: Performed by: PSYCHIATRY & NEUROLOGY

## 2024-11-08 PROCEDURE — 96523 IRRIG DRUG DELIVERY DEVICE: CPT

## 2024-11-08 RX ORDER — SODIUM CHLORIDE 0.9 % (FLUSH) 0.9 %
5-40 SYRINGE (ML) INJECTION ONCE
Status: COMPLETED | OUTPATIENT
Start: 2024-11-08 | End: 2024-11-08

## 2024-11-08 RX ADMIN — SODIUM CHLORIDE, PRESERVATIVE FREE 10 ML: 5 INJECTION INTRAVENOUS at 14:55

## 2024-11-08 NOTE — PROGRESS NOTES
[facility] Progress Note    Date: 2024    Name: Marva Santizo    MRN: [id number]         : 1970    Monthly Port flush     Ms. Santizo was assessed and education was provided. No other concerns noted.      Mediport was accessed with 20 gauge, 1\" cheek(s) after chloroprep.    Right chest, single    Blood return: yes    Flushed 20 of NS    Cheek needle(s) removed. Band-Aid applied.    Ms. Santizo tolerated the procedure, and had no complaints.    Ms. Santizo was [facility] in stable condition at 300. She is to return on 24 at 3:30 for her next appointment.    Xiomy Cox RN  2024  3:05 PM

## 2024-12-02 ENCOUNTER — OFFICE VISIT (OUTPATIENT)
Age: 54
End: 2024-12-02
Payer: MEDICARE

## 2024-12-02 VITALS
BODY MASS INDEX: 26.56 KG/M2 | RESPIRATION RATE: 20 BRPM | TEMPERATURE: 97.8 F | HEART RATE: 77 BPM | SYSTOLIC BLOOD PRESSURE: 126 MMHG | HEIGHT: 64 IN | OXYGEN SATURATION: 97 % | WEIGHT: 155.6 LBS | DIASTOLIC BLOOD PRESSURE: 70 MMHG

## 2024-12-02 DIAGNOSIS — Z23 IMMUNIZATION DUE: Primary | ICD-10-CM

## 2024-12-02 DIAGNOSIS — E78.2 MIXED HYPERLIPIDEMIA: ICD-10-CM

## 2024-12-02 DIAGNOSIS — E55.9 VITAMIN D DEFICIENCY: ICD-10-CM

## 2024-12-02 DIAGNOSIS — R73.01 IMPAIRED FASTING GLUCOSE: ICD-10-CM

## 2024-12-02 DIAGNOSIS — D32.0 BENIGN NEOPLASM OF CEREBRAL MENINGES (HCC): ICD-10-CM

## 2024-12-02 DIAGNOSIS — R92.333 HETEROGENEOUSLY DENSE TISSUE OF BOTH BREASTS ON MAMMOGRAPHY: ICD-10-CM

## 2024-12-02 DIAGNOSIS — Z91.89 INCREASED RISK OF BREAST CANCER: ICD-10-CM

## 2024-12-02 DIAGNOSIS — Z80.3 FAMILY HISTORY OF BREAST CANCER: ICD-10-CM

## 2024-12-02 PROCEDURE — 99214 OFFICE O/P EST MOD 30 MIN: CPT | Performed by: NURSE PRACTITIONER

## 2024-12-02 PROCEDURE — G8484 FLU IMMUNIZE NO ADMIN: HCPCS | Performed by: NURSE PRACTITIONER

## 2024-12-02 PROCEDURE — 90471 IMMUNIZATION ADMIN: CPT | Performed by: NURSE PRACTITIONER

## 2024-12-02 PROCEDURE — G8419 CALC BMI OUT NRM PARAM NOF/U: HCPCS | Performed by: NURSE PRACTITIONER

## 2024-12-02 PROCEDURE — G8427 DOCREV CUR MEDS BY ELIG CLIN: HCPCS | Performed by: NURSE PRACTITIONER

## 2024-12-02 PROCEDURE — 90714 TD VACC NO PRESV 7 YRS+ IM: CPT | Performed by: NURSE PRACTITIONER

## 2024-12-02 PROCEDURE — 1036F TOBACCO NON-USER: CPT | Performed by: NURSE PRACTITIONER

## 2024-12-02 PROCEDURE — 3017F COLORECTAL CA SCREEN DOC REV: CPT | Performed by: NURSE PRACTITIONER

## 2024-12-02 SDOH — ECONOMIC STABILITY: TRANSPORTATION INSECURITY
IN THE PAST 12 MONTHS, HAS LACK OF TRANSPORTATION KEPT YOU FROM MEETINGS, WORK, OR FROM GETTING THINGS NEEDED FOR DAILY LIVING?: NO

## 2024-12-02 SDOH — SOCIAL STABILITY: SOCIAL INSECURITY
WITHIN THE LAST YEAR, HAVE YOU BEEN KICKED, HIT, SLAPPED, OR OTHERWISE PHYSICALLY HURT BY YOUR PARTNER OR EX-PARTNER?: NO

## 2024-12-02 SDOH — SOCIAL STABILITY: SOCIAL NETWORK: ARE YOU MARRIED, WIDOWED, DIVORCED, SEPARATED, NEVER MARRIED, OR LIVING WITH A PARTNER?: MARRIED

## 2024-12-02 SDOH — SOCIAL STABILITY: SOCIAL INSECURITY: WITHIN THE LAST YEAR, HAVE YOU BEEN HUMILIATED OR EMOTIONALLY ABUSED IN OTHER WAYS BY YOUR PARTNER OR EX-PARTNER?: NO

## 2024-12-02 SDOH — ECONOMIC STABILITY: FOOD INSECURITY: WITHIN THE PAST 12 MONTHS, YOU WORRIED THAT YOUR FOOD WOULD RUN OUT BEFORE YOU GOT MONEY TO BUY MORE.: NEVER TRUE

## 2024-12-02 SDOH — HEALTH STABILITY: MENTAL HEALTH: HOW MANY STANDARD DRINKS CONTAINING ALCOHOL DO YOU HAVE ON A TYPICAL DAY?: 1 OR 2

## 2024-12-02 SDOH — SOCIAL STABILITY: SOCIAL INSECURITY: WITHIN THE LAST YEAR, HAVE YOU BEEN AFRAID OF YOUR PARTNER OR EX-PARTNER?: NO

## 2024-12-02 SDOH — HEALTH STABILITY: PHYSICAL HEALTH: ON AVERAGE, HOW MANY DAYS PER WEEK DO YOU ENGAGE IN MODERATE TO STRENUOUS EXERCISE (LIKE A BRISK WALK)?: 5 DAYS

## 2024-12-02 SDOH — SOCIAL STABILITY: SOCIAL NETWORK
DO YOU BELONG TO ANY CLUBS OR ORGANIZATIONS SUCH AS CHURCH GROUPS UNIONS, FRATERNAL OR ATHLETIC GROUPS, OR SCHOOL GROUPS?: NO

## 2024-12-02 SDOH — SOCIAL STABILITY: SOCIAL INSECURITY
WITHIN THE LAST YEAR, HAVE TO BEEN RAPED OR FORCED TO HAVE ANY KIND OF SEXUAL ACTIVITY BY YOUR PARTNER OR EX-PARTNER?: NO

## 2024-12-02 SDOH — ECONOMIC STABILITY: FOOD INSECURITY: WITHIN THE PAST 12 MONTHS, THE FOOD YOU BOUGHT JUST DIDN'T LAST AND YOU DIDN'T HAVE MONEY TO GET MORE.: NEVER TRUE

## 2024-12-02 SDOH — HEALTH STABILITY: MENTAL HEALTH: HOW OFTEN DO YOU HAVE A DRINK CONTAINING ALCOHOL?: MONTHLY OR LESS

## 2024-12-02 SDOH — SOCIAL STABILITY: SOCIAL NETWORK: HOW OFTEN DO YOU ATTEND CHURCH OR RELIGIOUS SERVICES?: MORE THAN 4 TIMES PER YEAR

## 2024-12-02 SDOH — SOCIAL STABILITY: SOCIAL NETWORK
IN A TYPICAL WEEK, HOW MANY TIMES DO YOU TALK ON THE PHONE WITH FAMILY, FRIENDS, OR NEIGHBORS?: MORE THAN THREE TIMES A WEEK

## 2024-12-02 SDOH — ECONOMIC STABILITY: INCOME INSECURITY: IN THE LAST 12 MONTHS, WAS THERE A TIME WHEN YOU WERE NOT ABLE TO PAY THE MORTGAGE OR RENT ON TIME?: NO

## 2024-12-02 SDOH — ECONOMIC STABILITY: TRANSPORTATION INSECURITY
IN THE PAST 12 MONTHS, HAS THE LACK OF TRANSPORTATION KEPT YOU FROM MEDICAL APPOINTMENTS OR FROM GETTING MEDICATIONS?: NO

## 2024-12-02 SDOH — SOCIAL STABILITY: SOCIAL NETWORK: HOW OFTEN DO YOU ATTENT MEETINGS OF THE CLUB OR ORGANIZATION YOU BELONG TO?: MORE THAN 4 TIMES PER YEAR

## 2024-12-02 SDOH — SOCIAL STABILITY: SOCIAL NETWORK: HOW OFTEN DO YOU GET TOGETHER WITH FRIENDS OR RELATIVES?: MORE THAN THREE TIMES A WEEK

## 2024-12-02 SDOH — HEALTH STABILITY: PHYSICAL HEALTH: ON AVERAGE, HOW MANY MINUTES DO YOU ENGAGE IN EXERCISE AT THIS LEVEL?: 150+ MIN

## 2024-12-02 SDOH — ECONOMIC STABILITY: INCOME INSECURITY: HOW HARD IS IT FOR YOU TO PAY FOR THE VERY BASICS LIKE FOOD, HOUSING, MEDICAL CARE, AND HEATING?: NOT HARD AT ALL

## 2024-12-02 NOTE — PROGRESS NOTES
Chief Complaint   Patient presents with    Advice Only     Mammo results       ASSESSMENT AND PLAN:       1. Immunization due    - Td, TDVAX, (age 7 yrs+), IM    2. Benign neoplasm of cerebral meninges (HCC)    - TSH; Future  - Comprehensive Metabolic Panel; Future  - CBC with Auto Differential; Future    3. Family history of breast cancer    - MRI BREAST BILATERAL W CONTRAST; Future    4. Heterogeneously dense tissue of both breasts on mammography    - MRI BREAST BILATERAL W CONTRAST; Future    5. Increased risk of breast cancer    - MRI BREAST BILATERAL W CONTRAST; Future    6. Mixed hyperlipidemia    - TSH; Future  - Lipid Panel; Future  - Comprehensive Metabolic Panel; Future  - CBC with Auto Differential; Future    7. Impaired fasting glucose    - Hemoglobin A1C; Future    8. Vitamin D deficiency    - Vitamin D 25 Hydroxy; Future     Reviewed mammogram results, family hx, chronic dense breast tissue. MRI ordered at [facility]. Future labs ordered to be drawn at [facility]. Tetanus updated today.     Patient aware of plan of care and verbalized understanding. Questions answered. RTC at least yearly, or sooner if needed.    HPI:     is a 54 y.o. female in today for follow up.    Benign brain meningioma: Under the care of Dr. Carrillo, Dr. Ruth at [facility], follows Q6 months. Underwent left retrosignoid craniotomy for resection of mass in June 2010.  Recurrence of disease in middle cranial fossa and displacing left temporal lobe.  Had a left pterional craniotomy for resection of the mass in December 2019.  Completed proton therapy with Dr. Verma in [facility]. Meningioma is stable.      Under the care of ENT through [facility].     New issues: In today with her  to discuss the finding of breast dense tissue on her mammogram. Sister with hx of breast ca. She is otherwise feeling quite well.     Allergies   Allergen Reactions    Penicillins Hives       Prior to Visit Medications    Medication Sig Taking?

## 2024-12-05 ENCOUNTER — TELEPHONE (OUTPATIENT)
Age: 54
End: 2024-12-05

## 2024-12-05 DIAGNOSIS — Z80.3 FAMILY HISTORY OF BREAST CANCER: ICD-10-CM

## 2024-12-05 DIAGNOSIS — R92.333 HETEROGENEOUSLY DENSE TISSUE OF BOTH BREASTS ON MAMMOGRAPHY: ICD-10-CM

## 2024-12-05 DIAGNOSIS — Z91.89 INCREASED RISK OF BREAST CANCER: ICD-10-CM

## 2024-12-05 DIAGNOSIS — D32.0 BENIGN NEOPLASM OF CEREBRAL MENINGES (HCC): Primary | ICD-10-CM

## 2024-12-05 NOTE — TELEPHONE ENCOUNTER
----- Message from Amaya BERG sent at 12/5/2024  9:54 AM EST -----  Regarding: Breast MRI  Good morning,    We only schedule breast MRI's as with and without contrast. Could you please update this order?    Thanks,    Scheduling

## 2024-12-06 ENCOUNTER — HOSPITAL ENCOUNTER (OUTPATIENT)
Facility: HOSPITAL | Age: 54
Setting detail: INFUSION SERIES
Discharge: HOME | End: 2024-12-06
Payer: MEDICARE

## 2024-12-06 DIAGNOSIS — E78.2 MIXED HYPERLIPIDEMIA: ICD-10-CM

## 2024-12-06 DIAGNOSIS — R73.01 IMPAIRED FASTING GLUCOSE: ICD-10-CM

## 2024-12-06 DIAGNOSIS — D32.0 BENIGN NEOPLASM OF CEREBRAL MENINGES (HCC): ICD-10-CM

## 2024-12-06 DIAGNOSIS — E55.9 VITAMIN D DEFICIENCY: ICD-10-CM

## 2024-12-06 LAB
ALBUMIN SERPL-MCNC: 3.6 G/DL (ref 3.5–5)
ALBUMIN/GLOB SERPL: 1.2 (ref 1.1–2.2)
ALP SERPL-CCNC: 76 U/L (ref 45–117)
ALT SERPL-CCNC: 15 U/L (ref 12–78)
ANION GAP SERPL CALC-SCNC: 7 MMOL/L (ref 2–12)
AST SERPL-CCNC: 12 U/L (ref 15–37)
BASOPHILS # BLD: 0 K/UL (ref 0–0.1)
BASOPHILS NFR BLD: 1 % (ref 0–1)
BILIRUB SERPL-MCNC: 0.2 MG/DL (ref 0.2–1)
BUN SERPL-MCNC: 12 MG/DL (ref 6–20)
BUN/CREAT SERPL: 13 (ref 12–20)
CALCIUM SERPL-MCNC: 8.9 MG/DL (ref 8.5–10.1)
CHLORIDE SERPL-SCNC: 106 MMOL/L (ref 97–108)
CHOLEST SERPL-MCNC: 145 MG/DL
CO2 SERPL-SCNC: 31 MMOL/L (ref 21–32)
CREAT SERPL-MCNC: 0.94 MG/DL (ref 0.55–1.02)
DIFFERENTIAL METHOD BLD: ABNORMAL
EOSINOPHIL # BLD: 0.1 K/UL (ref 0–0.4)
EOSINOPHIL NFR BLD: 2 % (ref 0–7)
ERYTHROCYTE [DISTWIDTH] IN BLOOD BY AUTOMATED COUNT: 12.8 % (ref 11.5–14.5)
EST. AVERAGE GLUCOSE BLD GHB EST-MCNC: 103 MG/DL
GLOBULIN SER CALC-MCNC: 2.9 G/DL (ref 2–4)
GLUCOSE SERPL-MCNC: 98 MG/DL (ref 65–100)
HBA1C MFR BLD: 5.2 % (ref 4–5.6)
HCT VFR BLD AUTO: 33.5 % (ref 35–47)
HDLC SERPL-MCNC: 42 MG/DL
HDLC SERPL: 3.5 (ref 0–5)
HGB BLD-MCNC: 11.2 G/DL (ref 11.5–16)
IMM GRANULOCYTES # BLD AUTO: 0 K/UL (ref 0–0.04)
IMM GRANULOCYTES NFR BLD AUTO: 0 % (ref 0–0.5)
LDLC SERPL CALC-MCNC: 38.2 MG/DL (ref 0–100)
LYMPHOCYTES # BLD: 1.7 K/UL (ref 0.8–3.5)
LYMPHOCYTES NFR BLD: 39 % (ref 12–49)
MCH RBC QN AUTO: 30.1 PG (ref 26–34)
MCHC RBC AUTO-ENTMCNC: 33.4 G/DL (ref 30–36.5)
MCV RBC AUTO: 90.1 FL (ref 80–99)
MONOCYTES # BLD: 0.3 K/UL (ref 0–1)
MONOCYTES NFR BLD: 7 % (ref 5–13)
NEUTS SEG # BLD: 2.2 K/UL (ref 1.8–8)
NEUTS SEG NFR BLD: 51 % (ref 32–75)
NRBC # BLD: 0 K/UL (ref 0–0.01)
NRBC BLD-RTO: 0 PER 100 WBC
PLATELET # BLD AUTO: 289 K/UL (ref 150–400)
PMV BLD AUTO: 9.3 FL (ref 8.9–12.9)
POTASSIUM SERPL-SCNC: 3.6 MMOL/L (ref 3.5–5.1)
PROT SERPL-MCNC: 6.5 G/DL (ref 6.4–8.2)
RBC # BLD AUTO: 3.72 M/UL (ref 3.8–5.2)
SODIUM SERPL-SCNC: 144 MMOL/L (ref 136–145)
TRIGL SERPL-MCNC: 324 MG/DL
TSH SERPL DL<=0.05 MIU/L-ACNC: 3.11 UIU/ML (ref 0.36–3.74)
VLDLC SERPL CALC-MCNC: 64.8 MG/DL
WBC # BLD AUTO: 4.3 K/UL (ref 3.6–11)

## 2024-12-06 PROCEDURE — 85025 COMPLETE CBC W/AUTO DIFF WBC: CPT

## 2024-12-06 PROCEDURE — 36415 COLL VENOUS BLD VENIPUNCTURE: CPT

## 2024-12-06 PROCEDURE — 80061 LIPID PANEL: CPT

## 2024-12-06 PROCEDURE — 84443 ASSAY THYROID STIM HORMONE: CPT

## 2024-12-06 PROCEDURE — 80053 COMPREHEN METABOLIC PANEL: CPT

## 2024-12-06 PROCEDURE — 82306 VITAMIN D 25 HYDROXY: CPT

## 2024-12-06 PROCEDURE — 36591 DRAW BLOOD OFF VENOUS DEVICE: CPT

## 2024-12-06 PROCEDURE — 83036 HEMOGLOBIN GLYCOSYLATED A1C: CPT

## 2024-12-06 NOTE — PROGRESS NOTES
[facility] Progress Note    Date: 2024    Name: Marva Santizo    MRN: [id number]         : 1970    Monthly Port flush     Ms. Santizo was assessed and education was provided. No other concerns noted.      Mediport was accessed with 20 gauge, 0.75 cheek(s) after chloroprep.    right chest, single    Blood return: yes    10 of blood collected for labs per protocol.    Flushed 20 of NS.    Cheek needle(s) removed. Band-Aid applied.    Ms. Santizo tolerated the procedure, and had no complaints.    [facility]izo [facility] condition at 4:05. She is to return on 1/10/25 at 3:15 for her next appointment.    Xiomy Cox RN  2024  4:11 PM

## 2024-12-07 LAB — 25(OH)D3 SERPL-MCNC: 18.6 NG/ML (ref 30–100)

## 2025-01-10 ENCOUNTER — HOSPITAL ENCOUNTER (OUTPATIENT)
Facility: HOSPITAL | Age: 55
Setting detail: INFUSION SERIES
Discharge: HOME | End: 2025-01-10
Payer: MEDICARE

## 2025-01-10 VITALS
OXYGEN SATURATION: 98 % | WEIGHT: 154.6 LBS | RESPIRATION RATE: 18 BRPM | BODY MASS INDEX: 26.54 KG/M2 | TEMPERATURE: 97.9 F | SYSTOLIC BLOOD PRESSURE: 117 MMHG | DIASTOLIC BLOOD PRESSURE: 80 MMHG | HEART RATE: 68 BPM

## 2025-01-10 PROCEDURE — 2500000003 HC RX 250 WO HCPCS: Performed by: PSYCHIATRY & NEUROLOGY

## 2025-01-10 PROCEDURE — 96523 IRRIG DRUG DELIVERY DEVICE: CPT

## 2025-01-10 RX ORDER — SODIUM CHLORIDE 0.9 % (FLUSH) 0.9 %
5-40 SYRINGE (ML) INJECTION ONCE
Status: COMPLETED | OUTPATIENT
Start: 2025-01-10 | End: 2025-01-10

## 2025-01-10 RX ADMIN — SODIUM CHLORIDE, PRESERVATIVE FREE 20 ML: 5 INJECTION INTRAVENOUS at 15:45

## 2025-01-10 NOTE — PROGRESS NOTES
[facility] Progress Note    Date: January 10, 2025    Name: Marva Santizo    MRN: [id number]         : 1970    Monthly Port flush       Ms. Santizo's vitals were reviewed.  Vitals:    01/10/25 1540   BP: 117/80   Pulse: 68   Resp: 18   Temp: 97.9 °F (36.6 °C)   SpO2: 98%       Mediport was accessed with 20 gauge short cheek(s) after chloroprep.    right chest, single    Blood return: YES    Flushed 20 of NS     Cheek needle(s) removed. Band-Aid applied.    Ms. Santizo tolerated the procedure, and had no complaints. Patient armband removed and shredded.    Ms. Santizo was [facility] in stable condition at 1550. She declined to schedule a return appointment. She indicated she would be in [place] and they would care for her port a cath. She would call when she needed another appointment at [facility].    Lorena Frost RN  January 10, 2025  3:53 PM

## 2025-03-04 ENCOUNTER — HOSPITAL ENCOUNTER (OUTPATIENT)
Facility: HOSPITAL | Age: 55
Discharge: HOME | End: 2025-03-07
Payer: MEDICARE

## 2025-03-04 DIAGNOSIS — Z91.89 INCREASED RISK OF BREAST CANCER: ICD-10-CM

## 2025-03-04 DIAGNOSIS — Z80.3 FAMILY HISTORY OF BREAST CANCER: ICD-10-CM

## 2025-03-04 DIAGNOSIS — R92.333 HETEROGENEOUSLY DENSE TISSUE OF BOTH BREASTS ON MAMMOGRAPHY: ICD-10-CM

## 2025-03-04 PROCEDURE — C8908 MRI W/O FOL W/CONT, BREAST,: HCPCS

## 2025-03-04 PROCEDURE — 2580000003 HC RX 258: Performed by: NURSE PRACTITIONER

## 2025-03-04 PROCEDURE — 6360000004 HC RX CONTRAST MEDICATION: Performed by: NURSE PRACTITIONER

## 2025-03-04 PROCEDURE — A9579 GAD-BASE MR CONTRAST NOS,1ML: HCPCS | Performed by: NURSE PRACTITIONER

## 2025-03-04 RX ORDER — 0.9 % SODIUM CHLORIDE 0.9 %
100 INTRAVENOUS SOLUTION INTRAVENOUS ONCE
Status: COMPLETED | OUTPATIENT
Start: 2025-03-04 | End: 2025-03-04

## 2025-03-04 RX ADMIN — SODIUM CHLORIDE 100 ML: 9 INJECTION, SOLUTION INTRAVENOUS at 14:00

## 2025-03-04 RX ADMIN — GADOTERIDOL 15 ML: 279.3 INJECTION, SOLUTION INTRAVENOUS at 13:59

## 2025-03-06 ENCOUNTER — HOSPITAL ENCOUNTER (OUTPATIENT)
Facility: HOSPITAL | Age: 55
Setting detail: INFUSION SERIES
Discharge: HOME | End: 2025-03-06
Payer: MEDICARE

## 2025-03-06 PROCEDURE — 96523 IRRIG DRUG DELIVERY DEVICE: CPT

## 2025-03-06 PROCEDURE — 2500000003 HC RX 250 WO HCPCS

## 2025-03-06 RX ORDER — SODIUM CHLORIDE 0.9 % (FLUSH) 0.9 %
5-40 SYRINGE (ML) INJECTION 2 TIMES DAILY
Status: DISCONTINUED | OUTPATIENT
Start: 2025-03-06 | End: 2025-03-07 | Stop reason: HOSPADM

## 2025-03-06 RX ADMIN — SODIUM CHLORIDE, PRESERVATIVE FREE 20 ML: 5 INJECTION INTRAVENOUS at 15:31

## 2025-03-06 NOTE — PROGRESS NOTES
[facility] Progress Note    Date: 2025    Name: Marva Santizo    MRN: [id number]         : 1970    Monthly Port flush     Ms. Santizo was assessed and education was provided. No other concerns noted.    There were no vitals filed for this visit.    Mediport was accessed with 20 gauge, short cheek after chloroprep.    right chest, single/    Blood return: YES    Flushed 20 of NS, brisk blood return    Cheek needle removed. Paper tape and gauze applied.    Ms. Santizo tolerated the procedure, and had no complaints.    Ms. Santizo was discharged [facility] in stable condition at 1540. She will call to schedule her next appointment.     Claudia Suárez RN  2025  3:43 PM

## 2025-04-18 ENCOUNTER — HOSPITAL ENCOUNTER (OUTPATIENT)
Facility: HOSPITAL | Age: 55
Setting detail: INFUSION SERIES
Discharge: HOME | End: 2025-04-18
Payer: MEDICARE

## 2025-04-18 VITALS
OXYGEN SATURATION: 94 % | BODY MASS INDEX: 26.23 KG/M2 | DIASTOLIC BLOOD PRESSURE: 81 MMHG | SYSTOLIC BLOOD PRESSURE: 129 MMHG | RESPIRATION RATE: 20 BRPM | TEMPERATURE: 97.7 F | HEART RATE: 70 BPM | WEIGHT: 152.8 LBS

## 2025-04-18 PROCEDURE — 2500000003 HC RX 250 WO HCPCS: Performed by: PSYCHIATRY & NEUROLOGY

## 2025-04-18 PROCEDURE — 96523 IRRIG DRUG DELIVERY DEVICE: CPT

## 2025-04-18 RX ORDER — SODIUM CHLORIDE 0.9 % (FLUSH) 0.9 %
5-40 SYRINGE (ML) INJECTION 2 TIMES DAILY
Status: DISCONTINUED | OUTPATIENT
Start: 2025-04-18 | End: 2025-04-19 | Stop reason: HOSPADM

## 2025-04-18 RX ADMIN — SODIUM CHLORIDE, PRESERVATIVE FREE 10 ML: 5 INJECTION INTRAVENOUS at 09:15

## 2025-04-18 NOTE — PROGRESS NOTES
[facility] Progress Note    Date: 2025    Name: Marva Santizo    MRN: [id number]         : 1970    Monthly Port flush     Ms. Santizo was assessed and education was provided. She denies new problems.    Ms. Santizo's vitals were reviewed.  Vitals:    25 0900   BP: 129/81   Pulse: 70   Resp: 20   Temp: 97.7 °F (36.5 °C)   SpO2: 94%       Mediport was accessed with 20 gauge 1 inch cheek after chloroprep.    right chest, single/  Blood return: yes    Flushed 10 ml of NS.  Cheek needle removed. Site intactr. Band-Aid applied.    Ms. Santizo tolerated the procedure, and had no complaints. Patient armband removed and shredded.    Ms. Santizo was [facility] in stable condition at 0920. She is to return on  at 3 pm for her next appointment.    Samara Loredo RN  2025  9:28 AM

## 2025-05-23 NOTE — PROGRESS NOTES
Patient notified by phone of lab results. Starting Rosuvastatin 10 mg daily. Labs in 4 weeks. Health Care Proxy (HCP)

## 2025-07-17 ENCOUNTER — HOSPITAL ENCOUNTER (OUTPATIENT)
Facility: HOSPITAL | Age: 55
Setting detail: INFUSION SERIES
Discharge: HOME | End: 2025-07-17
Payer: MEDICARE

## 2025-07-17 VITALS
HEIGHT: 64 IN | WEIGHT: 152.2 LBS | BODY MASS INDEX: 25.99 KG/M2 | RESPIRATION RATE: 16 BRPM | SYSTOLIC BLOOD PRESSURE: 112 MMHG | HEART RATE: 75 BPM | TEMPERATURE: 98.4 F | OXYGEN SATURATION: 98 % | DIASTOLIC BLOOD PRESSURE: 74 MMHG

## 2025-07-17 PROCEDURE — 2500000003 HC RX 250 WO HCPCS

## 2025-07-17 PROCEDURE — 2500000003 HC RX 250 WO HCPCS: Performed by: OBSTETRICS & GYNECOLOGY

## 2025-07-17 PROCEDURE — 96523 IRRIG DRUG DELIVERY DEVICE: CPT

## 2025-07-17 RX ORDER — SODIUM CHLORIDE 0.9 % (FLUSH) 0.9 %
5-40 SYRINGE (ML) INJECTION EVERY 12 HOURS SCHEDULED
Status: DISCONTINUED | OUTPATIENT
Start: 2025-07-17 | End: 2025-07-18 | Stop reason: HOSPADM

## 2025-07-17 RX ORDER — SODIUM CHLORIDE 0.9 % (FLUSH) 0.9 %
5-40 SYRINGE (ML) INJECTION PRN
Status: DISCONTINUED | OUTPATIENT
Start: 2025-07-17 | End: 2025-07-18 | Stop reason: HOSPADM

## 2025-07-17 RX ORDER — SODIUM CHLORIDE 9 MG/ML
INJECTION, SOLUTION INTRAVENOUS PRN
Status: DISCONTINUED | OUTPATIENT
Start: 2025-07-17 | End: 2025-07-18 | Stop reason: HOSPADM

## 2025-07-17 RX ADMIN — SODIUM CHLORIDE, PRESERVATIVE FREE 20 ML: 5 INJECTION INTRAVENOUS at 08:46

## 2025-07-17 NOTE — PROGRESS NOTES
[facility] Progress [facility]    Date: 2025    Name: Marva Santizo    MRN: [id number]         : 1970    Port Flush     Ms. Santizo ambulates into [facility] without difficulty, for port flush per ordering provider. Patient is alert and oriented. Name and  verified. No s/s infection. Patient denies pain or discomfort.    Ms. Santizo's vitals were reviewed.  Vitals:    25 0841   BP: 112/74   Pulse: 75   Resp: 16   Temp: 98.4 °F (36.9 °C)   SpO2: 98%     0846: Right chest mediport was accessed with 20 gauge, 0.75 inch cheek after chloroprep. Brisk blood return noted. Site WNL. Port flushed with 20 ml of normal saline per protocol.     0848: Port de-accessed without difficulty. The cheek needle condition was noted to be unremarkable. Site WNL. No hematoma, bleeding, or leaking noted at site. Band-Aid applied.    Ms. Santizo tolerated the procedure well, and had no complaints.    [facility]. Santizo was [facility] in stable condition at 0855. She is to return on 2025 at 0830 for her next appointment.    Isabelle Roberson RN  2025

## 2025-07-28 RX ORDER — ROSUVASTATIN CALCIUM 10 MG/1
10 TABLET, COATED ORAL EVERY EVENING
Qty: 90 TABLET | Refills: 3 | Status: SHIPPED | OUTPATIENT
Start: 2025-07-28

## 2025-08-28 ENCOUNTER — HOSPITAL ENCOUNTER (OUTPATIENT)
Facility: HOSPITAL | Age: 55
Setting detail: INFUSION SERIES
Discharge: HOME | End: 2025-08-28
Payer: MEDICARE

## 2025-08-28 VITALS
WEIGHT: 151.4 LBS | HEIGHT: 64 IN | OXYGEN SATURATION: 100 % | RESPIRATION RATE: 16 BRPM | DIASTOLIC BLOOD PRESSURE: 78 MMHG | SYSTOLIC BLOOD PRESSURE: 123 MMHG | TEMPERATURE: 97.7 F | BODY MASS INDEX: 25.85 KG/M2 | HEART RATE: 72 BPM

## 2025-08-28 PROCEDURE — 96523 IRRIG DRUG DELIVERY DEVICE: CPT

## 2025-08-28 ASSESSMENT — PAIN SCALES - GENERAL: PAINLEVEL_OUTOF10: 0

## (undated) DEVICE — ENDO CARRY-ON PROCEDURE KIT INCLUDES ENZYMATIC SPONGE, GAUZE, BIOHAZARD LABEL, TRAY, LUBRICANT, DIRTY SCOPE LABEL, WATER LABEL, TRAY, DRAWSTRING PAD, AND DEFENDO 4-PIECE KIT.: Brand: ENDO CARRY-ON PROCEDURE KIT

## (undated) DEVICE — SOLUTION IRRIG 1000ML STRL H2O USP PLAS POUR BTL

## (undated) DEVICE — FORCEPS BX L240CM JAW DIA2.2MM RAD JAW 4 HOT DISP